# Patient Record
Sex: MALE | Race: WHITE | NOT HISPANIC OR LATINO | Employment: FULL TIME | URBAN - METROPOLITAN AREA
[De-identification: names, ages, dates, MRNs, and addresses within clinical notes are randomized per-mention and may not be internally consistent; named-entity substitution may affect disease eponyms.]

---

## 2023-05-08 ENCOUNTER — PATIENT OUTREACH (OUTPATIENT)
Dept: ONCOLOGY | Facility: CLINIC | Age: 53
End: 2023-05-08
Payer: COMMERCIAL

## 2023-05-08 ENCOUNTER — PRE VISIT (OUTPATIENT)
Dept: OTHER | Age: 53
End: 2023-05-08

## 2023-05-08 ENCOUNTER — TRANSCRIBE ORDERS (OUTPATIENT)
Dept: OTHER | Age: 53
End: 2023-05-08

## 2023-05-08 DIAGNOSIS — J39.8 TRACHEOBRONCHOMALACIA: ICD-10-CM

## 2023-05-08 DIAGNOSIS — R06.00 DYSPNEA, UNSPECIFIED TYPE: Primary | ICD-10-CM

## 2023-05-08 NOTE — PROGRESS NOTES
New Patient: Interventional Pulmonary (Lung nodule) Nurse Navigator Note    Referring provider: Humberto Melara MDGenedav[place] [facility]     Referred to (specialty): Interventional Pulmonary (Lung nodule)    Requested provider (if applicable): n/a    Date Referral Received: 5/8/2023    Evaluation for : tracheobronchomalacia;       Clinical History (per Nurse review of records provided):    **BOOK MARKED**  2/7/2023   CT CHEST W IV CONTRAST   IMPRESSION: Appearances suggesting tracheomalacia.         01/31/23   PULMONARY FUNCTION TESTING     Records Location (Care Everywhere, Media, etc.): Epic, CE ([facility])    Records Needed: Need CT images and PFTs from [facility] from last 5 years     Additional testing needed prior to consult: none

## 2023-05-08 NOTE — TELEPHONE ENCOUNTER
Action 05/08/2023 CDK   Action Taken REQUEST SENT TO [facility] RADIOLOGY FOR ALL CHEST IMAGING  CK

## 2023-05-09 NOTE — PROGRESS NOTES
"LATE ENTRY:  I called Rigoberto yesterday afternoon, MON 5/8 at the request of our scheduling team.  We are attempting to get him scheduled to evaluate if he is a candidate for a stent placement.  He says he will in no way drive to \"the [place]\" for an office visit.  He also refuses to do a video consult as he is unfamiliar with those.  He is requesting a \"phone consult\" only and I told him we are not able to do those, but finally gave in and said I could ask.  He also wants to know if it is true that he would have to have two different procedures, one to place a temp stent and another to place a permament one, if the temp works. I said I could ask that question as well.  I told him I will get back to him the following day if I hear back from the doctor.  "

## 2023-05-09 NOTE — PROGRESS NOTES
"I called and spoke to Rigoberto.  I let him know that Dr. Desai is willing to do a \"telephone encounter\" on FRI 6/9, 9 am.  He is agreeable to that date/time. I also confirmed that he will most likely have multiple procedures for his \"complex problem\" per Dr. Desai.    He also asked about billing as he is going to check with his insurance company to be sure he has good coverage.  I gave him the NPI and TIN numbers for the [facility].    He also took my number in case he has further questions/concerns.  "

## 2023-06-07 NOTE — TELEPHONE ENCOUNTER
RECORDS STATUS - ALL OTHER DIAGNOSIS    Referral for Dyspnea, unspecified type [R06.00]  Tracheobronchomalacia, per pt, Humberto Melara MD  RECORDS RECEIVED FROM: [facility]    DATE RECEIVED:     Action    Action Taken 6/7/2023 5:23pm KEGONZALO    I called [facility]- I was transferred to the Radiology Dept. I called again.. the phone disconnected the first time. The phone disconnected again.. will try calling tomorrow.       NOTES STATUS DETAILS   OFFICE NOTE from referring provider  Referred by: Humberto Melara MD   [facility] PULMONARY MEDICINE      [address] [place] [id number]     Phone: [phone]     Fax: [phone]        OFFICE NOTE from medical oncologist     DISCHARGE SUMMARY from hospital     DISCHARGE REPORT from the ER     OPERATIVE REPORT     CLINICAL TRIAL TREATMENTS TO DATE     LABS     PATHOLOGY REPORTS  CE- 2/17/2023   Non-Gynecologic Cytology Interpretation     Lung, right middle lobe, bronchoalveolar lavage: Negative for malignant cells.      ANYTHING RELATED TO DIAGNOSIS     GENONOMIC TESTING     TYPE:     IMAGING (NEED IMAGES & REPORT)     CT SCANS Request- [facility]  CT Chest    MRI     Xray Chest Request- [facility]     MAMMO     ULTRASOUND     PET

## 2023-06-09 ENCOUNTER — VIRTUAL VISIT (OUTPATIENT)
Dept: PULMONOLOGY | Facility: CLINIC | Age: 53
End: 2023-06-09
Attending: INTERNAL MEDICINE
Payer: COMMERCIAL

## 2023-06-09 ENCOUNTER — PRE VISIT (OUTPATIENT)
Dept: PULMONOLOGY | Facility: CLINIC | Age: 53
End: 2023-06-09

## 2023-06-09 DIAGNOSIS — R06.00 DYSPNEA, UNSPECIFIED TYPE: ICD-10-CM

## 2023-06-09 DIAGNOSIS — J39.8 TRACHEOBRONCHOMALACIA: ICD-10-CM

## 2023-06-09 PROCEDURE — 99443 PR PHYSICIAN TELEPHONE EVALUATION 21-30 MIN: CPT | Performed by: INTERNAL MEDICINE

## 2023-06-09 RX ORDER — ALBUTEROL SULFATE 90 UG/1
2 AEROSOL, METERED RESPIRATORY (INHALATION) EVERY 4 HOURS PRN
COMMUNITY
Start: 2023-01-25 | End: 2024-05-24

## 2023-06-09 RX ORDER — ATORVASTATIN CALCIUM 40 MG/1
1 TABLET, FILM COATED ORAL AT BEDTIME
COMMUNITY
Start: 2022-12-16

## 2023-06-09 RX ORDER — SILDENAFIL CITRATE 20 MG/1
40-60 TABLET ORAL DAILY PRN
COMMUNITY
Start: 2023-03-13

## 2023-06-09 RX ORDER — RAMIPRIL 5 MG/1
1 CAPSULE ORAL AT BEDTIME
COMMUNITY
Start: 2022-12-16

## 2023-06-09 RX ORDER — GLUCAGON 3 MG/1
POWDER NASAL
COMMUNITY
Start: 2021-12-20

## 2023-06-09 RX ORDER — FLUTICASONE PROPIONATE AND SALMETEROL 232; 14 UG/1; UG/1
1 POWDER, METERED RESPIRATORY (INHALATION)
COMMUNITY
Start: 2023-01-31 | End: 2024-05-10

## 2023-06-09 RX ORDER — CARVEDILOL 25 MG/1
TABLET, FILM COATED ORAL
COMMUNITY
Start: 2023-03-04

## 2023-06-09 NOTE — PROGRESS NOTES
Virtual Visit Details    Type of service:  Telephone Visit   Phone call duration:     52M with dyspnea without a clear cause.  He lives up [place] without easy access to video visit.  He is willing to travel but would like to be efficient with things.    He had incidental tracheal collapse seen on CT done for eval of his symptoms.    He has no severe GERD or sinus disease.     He uses an asthma type control inhaler but it does not improve his symptoms.    He had a sleep eval in the past.  I cannot see that he had a sleep study.  At the time of his eval he had lost some weight and his symptoms resolved.    He has gained some weight since then.    He has diabetes which by his report is well controlled.    We discussed the eval.  His CT showed significant collapse.  It may be near the threshold for considering treatment.  We can do an inspection bronch with spontaneous breathing.  His previous bronch was done on the vent.    I discussed weight loss with him.  He is motivated to do this if I think it will help.    I think we can plan on bringing down for inspection bronch and visit with a surgeon.    I discussed with him stent trial and considering surgery - the entire process.  He is willing to go forward with things with that in mind.    25 minutes spent on this phone visit.    Rod Desai MD

## 2023-06-09 NOTE — NURSING NOTE
Is the patient currently in the state of [place]? YES    Visit mode:TELEPHONE    If the visit is dropped, the patient can be reconnected by: TELEPHONE VISIT: Phone number: [phone]    Will anyone else be joining the visit? NO      How would you like to obtain your AVS? Mail a copy    Are changes needed to the allergy or medication list? NO    Reason for visit: Consult      Daphne Smith VF

## 2023-06-16 ENCOUNTER — PREP FOR PROCEDURE (OUTPATIENT)
Dept: MULTI SPECIALTY CLINIC | Facility: CLINIC | Age: 53
End: 2023-06-16
Payer: COMMERCIAL

## 2023-06-16 DIAGNOSIS — J39.8 TRACHEOBRONCHOMALACIA: Primary | ICD-10-CM

## 2023-06-16 RX ORDER — LIDOCAINE 40 MG/G
CREAM TOPICAL
Status: CANCELLED | OUTPATIENT
Start: 2023-06-16

## 2023-06-19 ENCOUNTER — PATIENT OUTREACH (OUTPATIENT)
Dept: ONCOLOGY | Facility: CLINIC | Age: 53
End: 2023-06-19
Payer: COMMERCIAL

## 2023-06-21 NOTE — PROGRESS NOTES
New Patient Oncology Nurse Navigator Note     Referring provider: Dr. Rod Desai    Referring Clinic/Organization: [facility]  Referred to: Thoracic Surgery  Requested provider (if applicable): First available - did not specify   Referral Received: 06/16/23       Evaluation for :   Diagnosis   J39.8 (ICD-10-CM) - Tracheobronchomalacia     Clinical History (per Nurse review of records provided):    01/31/2023 PFT (bookmarked)    02/07/2023 CT Chest w/ contrast - dynamic (care everywhere) showed:   FINDINGS: No discrete pulmonary nodularity or focal consolidation. No pleural or pericardial effusion. No pathologically enlarged mediastinal or hilar lymph nodes.     There is anterior-to-posterior narrowing of the trachea with expiratory imaging. This involves the mainstem bronchi to a lesser amount.     No discrete airway lesion. No bronchiectatic changes.     IMPRESSION: Appearances suggesting tracheomalacia.     06/09/2023 OV notes from referring provider bookmarked - Dr. Desai is arranging for bronch and wants to coordinate surgical consult during same visit.   Clinical Assessment / Barriers to Care (Per Nurse):  Never smoker    [facility]     Records Needed: None  Additional testing needed prior to consult: None  Referral updates and Plan:   Writer called Rigoberto to discuss the referral. He reports he thought Dr. Desai's nurse was going to call him shortly after his virtual visit on 6/9 to schedule the bronch. He also reports he thought the thoracic surgeon was going to see him during the bronch procedure. Writer will send a message to Dr. Desai's team to clarify and will update Rigoberto.     6/30/23: per chart review, IP surgery scheduling has been trying to reach Rigoberto to discuss scheduling the procedure and coordinating with appt with Dr. Dumont. So far, their attempts to reach Rigoberto have been unsuccessful. Per Dr. Desai, the appt with Dr. Dumont does not need to line up for the  procedure, it was only for patient convenience. Will send scheduling instructions to schedule next available with Dr. Dumont.     Caitlyn Morales, CALDERONN, RN, N  [facility] Oncology Nurse Navigator  [phone] / [phone]

## 2023-06-23 ENCOUNTER — TELEPHONE (OUTPATIENT)
Dept: PULMONOLOGY | Facility: CLINIC | Age: 53
End: 2023-06-23
Payer: COMMERCIAL

## 2023-06-23 NOTE — TELEPHONE ENCOUNTER
Writer attempted to reach the patient to schedule GI procedure for 07/05, 07/12, or 07/19 per Dr. Dumont's availability. Left detailed message with callback number [phone].    Vj Ortega on 6/23/2023 at 8:14 AM

## 2023-06-28 NOTE — TELEPHONE ENCOUNTER
Writer contacted the patient to schedule GI procedure. Writer was given dates 07/10-07/13, but patient requested a Friday. Staff message sent to provider team for assessment. Will follow up.    Vj Ortega on 6/28/2023 at 11:52 AM

## 2023-06-30 NOTE — TELEPHONE ENCOUNTER
attempted to reach the patient to schedule IP procedure after confirmation that Dr. Dumont is not available on Fridays. Left detailed message with callback number [phone].    Vj Ortega on 6/30/2023 at 2:23 PM

## 2023-07-06 ENCOUNTER — CARE COORDINATION (OUTPATIENT)
Dept: PULMONOLOGY | Facility: CLINIC | Age: 53
End: 2023-07-06
Payer: COMMERCIAL

## 2023-07-06 NOTE — PROGRESS NOTES
RNCC send preoperative instructions to pt via encrypted e-mail per pt request as pt does not have MyChart and wanted in writing.     E-mail sent to [email or web address] (verified by pt).     The following was discussed in the e-mail sent to pt:  -- procedure and MD performing procedure  -- Date and time of arrival + surgery start time  -- location of [facility] EB  --  and where to check in  -- will need  day of procedure  -- wear comfy clothing to change in and out of  -- leave valuables, dentures, jewelry at home  -- NPO status, liquid status  -- take shower am of procedure  -- length of procedure, that it is outpatient, length in pre/post op  -- number for Vj (procedure ) should more questions arise/need to cancel or reschedule.

## 2023-07-06 NOTE — TELEPHONE ENCOUNTER
Writer spoke with the patient regarding scheduling GI procedure. Scheduled for 07/13 with Dr. Desai. H&P not needed for endo case. Packet information being emailed per patient request.    Vj Ortega on 7/6/2023 at 8:59 AM

## 2023-07-12 ENCOUNTER — TELEPHONE (OUTPATIENT)
Dept: PULMONOLOGY | Facility: CLINIC | Age: 53
End: 2023-07-12
Payer: COMMERCIAL

## 2023-07-12 NOTE — TELEPHONE ENCOUNTER
Pt contacted clinic for Bronch prep and directions to facility as he is unable to open the secure email that was previously sent.  The following was discussed:    -- Date and time of arrival + surgery start time  -- location of [facility] EB  --  and where to check in  -- will need  day of procedure  -- wear comfy clothing to change in and out of  -- leave valuables, dentures, jewelry at home  -- NPO status, liquid status  -- take shower am of procedure    Pt voiced concern regarding the need for a , he states he was not aware of this when procedure was discussed.  Writer reiterated that procedure will not take place unless he has someone to drive him home.  Pt verbalized understanding.

## 2023-07-13 ENCOUNTER — HOSPITAL ENCOUNTER (OUTPATIENT)
Facility: CLINIC | Age: 53
Discharge: HOME | End: 2023-07-13
Attending: INTERNAL MEDICINE | Admitting: INTERNAL MEDICINE
Payer: COMMERCIAL

## 2023-07-13 VITALS
SYSTOLIC BLOOD PRESSURE: 131 MMHG | RESPIRATION RATE: 16 BRPM | HEART RATE: 80 BPM | OXYGEN SATURATION: 95 % | DIASTOLIC BLOOD PRESSURE: 79 MMHG

## 2023-07-13 LAB
GLUCOSE BLDC GLUCOMTR-MCNC: 50 MG/DL (ref 70–99)
GLUCOSE BLDC GLUCOMTR-MCNC: 86 MG/DL (ref 70–99)

## 2023-07-13 PROCEDURE — 31622 DX BRONCHOSCOPE/WASH: CPT | Mod: GC | Performed by: INTERNAL MEDICINE

## 2023-07-13 PROCEDURE — G0500 MOD SEDAT ENDO SERVICE >5YRS: HCPCS | Performed by: INTERNAL MEDICINE

## 2023-07-13 PROCEDURE — 31624 DX BRONCHOSCOPE/LAVAGE: CPT | Performed by: INTERNAL MEDICINE

## 2023-07-13 PROCEDURE — 250N000011 HC RX IP 250 OP 636: Performed by: INTERNAL MEDICINE

## 2023-07-13 PROCEDURE — 82962 GLUCOSE BLOOD TEST: CPT

## 2023-07-13 PROCEDURE — 250N000009 HC RX 250: Performed by: INTERNAL MEDICINE

## 2023-07-13 RX ORDER — LIDOCAINE HYDROCHLORIDE 40 MG/ML
INJECTION, SOLUTION RETROBULBAR PRN
Status: DISCONTINUED | OUTPATIENT
Start: 2023-07-13 | End: 2023-07-13 | Stop reason: HOSPADM

## 2023-07-13 RX ORDER — LIDOCAINE 40 MG/G
CREAM TOPICAL
Status: DISCONTINUED | OUTPATIENT
Start: 2023-07-13 | End: 2023-07-13 | Stop reason: HOSPADM

## 2023-07-13 RX ORDER — FENTANYL CITRATE 50 UG/ML
INJECTION, SOLUTION INTRAMUSCULAR; INTRAVENOUS PRN
Status: DISCONTINUED | OUTPATIENT
Start: 2023-07-13 | End: 2023-07-13 | Stop reason: HOSPADM

## 2023-07-13 RX ORDER — LIDOCAINE HYDROCHLORIDE 10 MG/ML
INJECTION, SOLUTION INFILTRATION; PERINEURAL PRN
Status: DISCONTINUED | OUTPATIENT
Start: 2023-07-13 | End: 2023-07-13 | Stop reason: HOSPADM

## 2023-07-13 ASSESSMENT — ACTIVITIES OF DAILY LIVING (ADL)
ADLS_ACUITY_SCORE: 35
ADLS_ACUITY_SCORE: 35

## 2023-07-13 NOTE — OR NURSING
Pt underwent bronchoscopy under conscious sedation. Pt transferred to recovery and report given to endo RN.       Stephany Izaguirre RN

## 2023-07-13 NOTE — DISCHARGE INSTRUCTIONS
Post Bronchoscopy Patient Instructions:    July 13, 2023  Brendan Baldwinsimi    Your procedure was completed (bronchoscopy with inspection) without any immediate complications.    You may cough up scant amount of blood for the next 12-24 hours. If you have excessive cough with blood, chest pain, shortness of breath, please report to the closest emergency room.    You may experience low grade (less than 100.5 F) fever next 24 hours, if so, you can take Tylenol. If the fever persists more than 24 hours, please contact to our office or your primary care provider.    Our office (Thoracic/Pulmonary--[phone]) will call you with the results of any samples taken during the procedure. Please note that you may get a result notification through  My Chart  before us calling you as the Laboratories are instructed to release the results as soon as they are available to the patients and providers at the same time. Please allow your provider 24 hours call you to discuss the results.    You may resume your diet as it was prior to procedure.    You may resume your medications after the procedure unless you are instructed to do differently.     Please follow instructions from the nursing staff upon discharge in terms of activity. In general, you should avoid any attention or motor skill requiring activities (e.g., driving or operating any motorized vehicle) for 24 hours as you might be still under the effect of sedation medications. Please make sure an adult to accompany you next 24 hours.     Should you have any question, please do not hesitate to call our office.    Rod Desai MD

## 2023-07-13 NOTE — BRIEF OP NOTE
[facility]    Brief Operative Note    Pre-operative diagnosis: Tracheobronchomalacia [J39.8]  Post-operative diagnosis Same as pre-operative diagnosis    Procedure: Procedure(s):  BRONCHOSCOPY, WITH BRONCHOALVEOLAR LAVAGE  Surgeon: Surgeon(s) and Role:     * Rod Desai MD - Primary  Anesthesia: Moderate Sedation   Estimated Blood Loss: None    Drains: None  Specimens: * No specimens in log *  Findings:   tracheobronchomalacia.  Complications: None.  Implants: * No implants in log *

## 2023-07-14 NOTE — TELEPHONE ENCOUNTER
Referring Provider/Location:  Dr. Rod Desai [facility]  Dx and Code: Tracheobronchomalacia [J39.8]  Appt Date:  7.19.23  Provider: Tim Cardenas    July 14, 2023 10:26 AM TJ   Internal Referral, No outside records needed

## 2023-07-16 LAB — BRONCHOSCOPY: NORMAL

## 2023-07-19 ENCOUNTER — PRE VISIT (OUTPATIENT)
Dept: SURGERY | Facility: CLINIC | Age: 53
End: 2023-07-19
Payer: COMMERCIAL

## 2023-07-26 ENCOUNTER — VIRTUAL VISIT (OUTPATIENT)
Dept: SURGERY | Facility: CLINIC | Age: 53
End: 2023-07-26
Attending: INTERNAL MEDICINE
Payer: COMMERCIAL

## 2023-07-26 VITALS — BODY MASS INDEX: 31.15 KG/M2 | WEIGHT: 230 LBS | HEIGHT: 72 IN

## 2023-07-26 DIAGNOSIS — J39.8 TRACHEOBRONCHOMALACIA: Primary | ICD-10-CM

## 2023-07-26 PROCEDURE — 99443 PR PHYSICIAN TELEPHONE EVALUATION 21-30 MIN: CPT | Performed by: THORACIC SURGERY (CARDIOTHORACIC VASCULAR SURGERY)

## 2023-07-26 NOTE — LETTER
7/26/2023         RE: Brendan Helms  [address]  [place] [address]        Dear Colleague,    Thank you for referring your patient, Brendan Helms, to the [facility]. Please see a copy of my visit note below.    Virtual Visit Details    Type of service:  Telephone Visit   Phone call duration: 30 min      THORACIC SURGERY - NEW PATIENT OFFICE VISIT      Dear Dr. Desai,    I saw Brendan Helms  in consultation for the evaluation and treatment of TBM.    HPI  Brendan Helms is a 52 year old male who presented with exertional dyspnea. He is being evaluated for tracheobronchoplasty. He does not have coughing spells or recurrent pulmonary infections. No significant reflux.   Previsit Tests   PFT None  6MWT None  SGRQ None  7/13/23 Bronchoscopy: 90% collapse affecting trachea and LMSB, 80% collapse of RMSB.     02/07/2023 CT Chest w/ contrast - Appearances suggesting tracheomalacia.     PMH  HLD  DM    PSH    Past Surgical History:   Procedure Laterality Date    BRONCHOSCOPY (RIGID OR FLEXIBLE), DIAGNOSTIC N/A 7/13/2023    Procedure: BRONCHOSCOPY, WITH BRONCHOALVEOLAR LAVAGE;  Surgeon: Rod Desai MD;  Location:  GI         Allergies   Allergen Reactions    Amoxicillin-Pot Clavulanate Hives     Current Outpatient Medications   Medication    albuterol (PROAIR HFA/PROVENTIL HFA/VENTOLIN HFA) 108 (90 Base) MCG/ACT inhaler    atorvastatin (LIPITOR) 40 MG tablet    blood glucose (CONTOUR TEST) test strip    fluticasone-salmeterol (AIRDUO RESPICLICK) 232-14 MCG/ACT inhaler    Glucagon (BAQSIMI ONE PACK) 3 MG/DOSE POWD    insulin aspart (NOVOLOG PEN) 100 UNIT/ML pen    insulin glargine (LANTUS PEN) 100 UNIT/ML pen    ramipril (ALTACE) 5 MG capsule    sildenafil (REVATIO) 20 MG tablet     No current facility-administered medications for this visit.     Social History     Tobacco Use    Smoking status: Never    Smokeless tobacco: Never   Vaping Use    Vaping Use: Never used      Exam  Deferred      From a personal perspective, he is at home with his family.     IMPRESSION   52 year old male with TBM and EDAC.     PLAN  I spent 30 min on the date of the encounter in chart review, patient visit, review of tests, documentation and/or discussion with other providers about the issues documented above. I reviewed the plan as follows:    I discussed with Rigoberto that we would need to complete work up first and then see him in clinic with results.     Necessary Preop Tests & Appointments: PFT, 6MWT, dynamic CT scan, follow up visit with me.     I appreciate the opportunity to participate in the care of your patient and will keep you updated.    Sincerely,    Lisa Ochoa MD

## 2023-07-26 NOTE — NURSING NOTE
Is the patient currently in the state of [place]? YES    Visit mode:TELEPHONE    If the visit is dropped, the patient can be reconnected by: VIDEO VISIT: Text to cell phone: [phone]    Will anyone else be joining the visit? NO      How would you like to obtain your AVS? Mail a copy    Are changes needed to the allergy or medication list? NO    Reason for visit: Consult (Video visit )  Irais Campos

## 2023-07-26 NOTE — PROGRESS NOTES
Virtual Visit Details    Type of service:  Telephone Visit   Phone call duration: 30 min      THORACIC SURGERY - NEW PATIENT OFFICE VISIT      Dear Dr. Desai,    I saw Brendan Helms  in consultation for the evaluation and treatment of TBM.    HPI  Brendan Helms is a 52 year old male who presented with exertional dyspnea. He is being evaluated for tracheobronchoplasty. He does not have coughing spells or recurrent pulmonary infections. No significant reflux.   Previsit Tests   PFT None  6MWT None  SGRQ None  7/13/23 Bronchoscopy: 90% collapse affecting trachea and LMSB, 80% collapse of RMSB.     02/07/2023 CT Chest w/ contrast - Appearances suggesting tracheomalacia.     PMH  HLD  DM    PSH    Past Surgical History:   Procedure Laterality Date    BRONCHOSCOPY (RIGID OR FLEXIBLE), DIAGNOSTIC N/A 7/13/2023    Procedure: BRONCHOSCOPY, WITH BRONCHOALVEOLAR LAVAGE;  Surgeon: Rod Desai MD;  Location:  GI         Allergies   Allergen Reactions    Amoxicillin-Pot Clavulanate Hives     Current Outpatient Medications   Medication    albuterol (PROAIR HFA/PROVENTIL HFA/VENTOLIN HFA) 108 (90 Base) MCG/ACT inhaler    atorvastatin (LIPITOR) 40 MG tablet    blood glucose (CONTOUR TEST) test strip    fluticasone-salmeterol (AIRDUO RESPICLICK) 232-14 MCG/ACT inhaler    Glucagon (BAQSIMI ONE PACK) 3 MG/DOSE POWD    insulin aspart (NOVOLOG PEN) 100 UNIT/ML pen    insulin glargine (LANTUS PEN) 100 UNIT/ML pen    ramipril (ALTACE) 5 MG capsule    sildenafil (REVATIO) 20 MG tablet     No current facility-administered medications for this visit.     Social History     Tobacco Use    Smoking status: Never    Smokeless tobacco: Never   Vaping Use    Vaping Use: Never used     Exam  Deferred      From a personal perspective, he is at home with his family.     IMPRESSION   52 year old male with TBM and EDAC.     PLAN  I spent 30 min on the date of the encounter in chart review, patient visit, review of tests, documentation and/or  discussion with other providers about the issues documented above. I reviewed the plan as follows:    I discussed with Rigoberto that we would need to complete work up first and then see him in clinic with results.     Necessary Preop Tests & Appointments: PFT, 6MWT, dynamic CT scan, follow up visit with me.     I appreciate the opportunity to participate in the care of your patient and will keep you updated.    Sincerely,    Lisa Ochoa MD

## 2023-08-02 DIAGNOSIS — J39.8 TRACHEOBRONCHOMALACIA: Primary | ICD-10-CM

## 2023-08-03 ENCOUNTER — DOCUMENTATION ONLY (OUTPATIENT)
Dept: SURGERY | Facility: CLINIC | Age: 53
End: 2023-08-03
Payer: COMMERCIAL

## 2023-08-03 NOTE — PROGRESS NOTES
Orders for PFT, 6MWT and Dynamic CT faxed to [facility] in [place] at [phone] per patient request. Confirmation of successful transmission received.     Linda Sanchez RN, BSN  Thoracic Surgery RN Care Coordinator

## 2023-08-07 ENCOUNTER — DOCUMENTATION ONLY (OUTPATIENT)
Dept: SURGERY | Facility: CLINIC | Age: 53
End: 2023-08-07
Payer: COMMERCIAL

## 2023-08-07 NOTE — PROGRESS NOTES
Patient reported he hasn't been contacted to schedule 6MWT and PFTs. Orders were faxed with confirmation of successful transmission on 8/3/2023.    Refaxed 6MWT and PFT orders to [facility] in [place] at [phone].     Linda Sanchez RN, BSN  Thoracic Surgery RN Care Coordinator

## 2023-09-11 ENCOUNTER — DOCUMENTATION ONLY (OUTPATIENT)
Dept: SURGERY | Facility: CLINIC | Age: 53
End: 2023-09-11
Payer: COMMERCIAL

## 2023-09-13 NOTE — PROGRESS NOTES
[facility] Respiratory Questionnaire mailed to patient's home address. Instructions to complete and bring to clinic visit with Dr Dumont on Wednesday September 20th, 2023.

## 2023-09-20 ENCOUNTER — ANCILLARY PROCEDURE (OUTPATIENT)
Dept: CT IMAGING | Facility: CLINIC | Age: 53
End: 2023-09-20
Attending: CLINICAL NURSE SPECIALIST
Payer: COMMERCIAL

## 2023-09-20 ENCOUNTER — ONCOLOGY VISIT (OUTPATIENT)
Dept: SURGERY | Facility: CLINIC | Age: 53
End: 2023-09-20
Attending: THORACIC SURGERY (CARDIOTHORACIC VASCULAR SURGERY)
Payer: COMMERCIAL

## 2023-09-20 VITALS
HEART RATE: 84 BPM | OXYGEN SATURATION: 98 % | TEMPERATURE: 98.4 F | RESPIRATION RATE: 16 BRPM | HEIGHT: 72 IN | BODY MASS INDEX: 32.43 KG/M2 | WEIGHT: 239.4 LBS | SYSTOLIC BLOOD PRESSURE: 138 MMHG | DIASTOLIC BLOOD PRESSURE: 79 MMHG

## 2023-09-20 DIAGNOSIS — J39.8 TRACHEOBRONCHOMALACIA: Primary | ICD-10-CM

## 2023-09-20 DIAGNOSIS — J39.8 TRACHEOBRONCHOMALACIA: ICD-10-CM

## 2023-09-20 PROCEDURE — G0463 HOSPITAL OUTPT CLINIC VISIT: HCPCS | Performed by: THORACIC SURGERY (CARDIOTHORACIC VASCULAR SURGERY)

## 2023-09-20 PROCEDURE — 71250 CT THORAX DX C-: CPT | Performed by: RADIOLOGY

## 2023-09-20 PROCEDURE — 99215 OFFICE O/P EST HI 40 MIN: CPT | Performed by: THORACIC SURGERY (CARDIOTHORACIC VASCULAR SURGERY)

## 2023-09-20 ASSESSMENT — PAIN SCALES - GENERAL: PAINLEVEL: NO PAIN (0)

## 2023-09-20 NOTE — NURSING NOTE
"Oncology Rooming Note    September 20, 2023 11:09 AM   Brendan Helms is a 52 year old male who presents for:    Chief Complaint   Patient presents with    Oncology Clinic Visit     [facility] RETURN - TRACHEOBRONCHOMALACIA     Initial Vitals: /79 (BP Location: Right arm, Patient Position: Chair, Cuff Size: Adult Large)   Pulse 84   Temp 98.4  F (36.9  C)   Resp 16   Ht 1.829 m (6' 0.01\")   Wt 108.6 kg (239 lb 6.4 oz)   SpO2 98%   BMI 32.46 kg/m   Estimated body mass index is 32.46 kg/m  as calculated from the following:    Height as of this encounter: 1.829 m (6' 0.01\").    Weight as of this encounter: 108.6 kg (239 lb 6.4 oz). Body surface area is 2.35 meters squared.  No Pain (0) Comment: Data Unavailable   No LMP for male patient.  Allergies reviewed: Yes  Medications reviewed: Yes    Medications: Medication refills not needed today.  Pharmacy name entered into [facility]: [facility] [place] - [address] AT [facility]    Ankur Early LPN              "

## 2023-09-20 NOTE — LETTER
9/20/2023         RE: Brendan Helms  [address]  [place] [address]        Dear Colleague,    Thank you for referring your patient, Brendan Helms, to the [facility]. Please see a copy of my visit note below.    THORACIC SURGERY FOLLOW UP VISIT    I saw Mr. Brendan Helms in follow-up today. The clinical summary follows:     DIAGNOSIS   TBM and EDAC  INTERVAL STUDIES  PFT 8/18/23: FEV1 3.2L, 78%. DLCO 95%.   CT scan 9/20/23:           Previsit tests  7/13/23 Bronchoscopy: 90% collapse affecting trachea and LMSB, 80% collapse of RMSB.     4/28/23 Echo  Technically difficult echocardiogram.   The left ventricular systolic function is normal.   Left ventricular diastolic function is normal.   No significant valvular stenosis or insufficiency seen.   TR signal inadequate to allow accurate estimate RV systolic pressure.   There is no pericardial effusion.     No past medical history on file.    Past Surgical History:   Procedure Laterality Date    BRONCHOSCOPY (RIGID OR FLEXIBLE), DIAGNOSTIC N/A 7/13/2023    Procedure: BRONCHOSCOPY, WITH BRONCHOALVEOLAR LAVAGE;  Surgeon: Rod Desai MD;  Location:  GI      Allergies   Allergen Reactions    Amoxicillin-Pot Clavulanate Hives     Current Outpatient Medications   Medication    albuterol (PROAIR HFA/PROVENTIL HFA/VENTOLIN HFA) 108 (90 Base) MCG/ACT inhaler    atorvastatin (LIPITOR) 40 MG tablet    blood glucose (CONTOUR TEST) test strip    fluticasone-salmeterol (AIRDUO RESPICLICK) 232-14 MCG/ACT inhaler    Glucagon (BAQSIMI ONE PACK) 3 MG/DOSE POWD    insulin aspart (NOVOLOG PEN) 100 UNIT/ML pen    insulin glargine (LANTUS PEN) 100 UNIT/ML pen    ramipril (ALTACE) 5 MG capsule    sildenafil (REVATIO) 20 MG tablet     No current facility-administered medications for this visit.     Social History     Tobacco Use    Smoking status: Never    Smokeless tobacco: Never   Vaping Use    Vaping Use: Never used     Exam  /79 (BP  "Location: Right arm, Patient Position: Chair, Cuff Size: Adult Large)   Pulse 84   Temp 98.4  F (36.9  C)   Resp 16   Ht 1.829 m (6' 0.01\")   Wt 108.6 kg (239 lb 6.4 oz)   SpO2 98%   BMI 32.46 kg/m    Alert and oriented. NAD  Bilateral breath sounds.       SUBJECTIVE  He comes to clinic for a follow up after his work up. His main symptom is exertional dyspnea which is impacting his life significantly. He can't even shovel snow for more than 5 min without getting winded. He has mild coughing spells in the morning soon after he gets in the shower. No recurrent pulmonary infections. No heartburn or regurgitation.     He does not use a CPAP machine and is not willing to try it because he believes it will interfere with his ability to get a 's license that he needs for his work.     From a personal perspective, he comes to clinic alone. 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     IMPRESSION  51 y/o with TBM and EDAC.     PLAN  I spent 60 min on the date of the encounter in chart review, patient visit, review of tests, documentation and/or discussion with other providers about the issues documented above. I reviewed the plan as follows:  I discussed with Rigoberto his treatment options. I told him I would like to do a stent trial and schedule him for surgery afterwards.   Procedure planned: Airway stent trial (by Lloyd)     Right VATS possible open TBP  Necessary Tests & Appointments: Echo &  6MWT results (get from [place]).  Pain Control Plan: intercostal nerve cryoablation.  Anticoagulation Plan: Enoxaparin postop.    I had an extensive discussion with the patient and his family regarding the rationale for surgery, the alternatives risks and benefits of the procedure. I explained the expected hospital stay, postoperative course, recovery time, diet and activity restrictions. Informed consent was obtained and they agreed to proceed.  All questions were answered and the patient and " present family were in agreement with the plan.  I appreciate the opportunity to participate in the care of your patient and will keep you updated.  Sincerely,  Lisa Ochoa MD

## 2023-09-20 NOTE — PROGRESS NOTES
"THORACIC SURGERY FOLLOW UP VISIT    I saw Mr. Brendan Helms in follow-up today. The clinical summary follows:     DIAGNOSIS   TBM and EDAC  INTERVAL STUDIES  PFT 8/18/23: FEV1 3.2L, 78%. DLCO 95%.   CT scan 9/20/23:           Previsit tests  7/13/23 Bronchoscopy: 90% collapse affecting trachea and LMSB, 80% collapse of RMSB.     4/28/23 Echo  Technically difficult echocardiogram.   The left ventricular systolic function is normal.   Left ventricular diastolic function is normal.   No significant valvular stenosis or insufficiency seen.   TR signal inadequate to allow accurate estimate RV systolic pressure.   There is no pericardial effusion.     No past medical history on file.    Past Surgical History:   Procedure Laterality Date    BRONCHOSCOPY (RIGID OR FLEXIBLE), DIAGNOSTIC N/A 7/13/2023    Procedure: BRONCHOSCOPY, WITH BRONCHOALVEOLAR LAVAGE;  Surgeon: Rod Desai MD;  Location:  GI      Allergies   Allergen Reactions    Amoxicillin-Pot Clavulanate Hives     Current Outpatient Medications   Medication    albuterol (PROAIR HFA/PROVENTIL HFA/VENTOLIN HFA) 108 (90 Base) MCG/ACT inhaler    atorvastatin (LIPITOR) 40 MG tablet    blood glucose (CONTOUR TEST) test strip    fluticasone-salmeterol (AIRDUO RESPICLICK) 232-14 MCG/ACT inhaler    Glucagon (BAQSIMI ONE PACK) 3 MG/DOSE POWD    insulin aspart (NOVOLOG PEN) 100 UNIT/ML pen    insulin glargine (LANTUS PEN) 100 UNIT/ML pen    ramipril (ALTACE) 5 MG capsule    sildenafil (REVATIO) 20 MG tablet     No current facility-administered medications for this visit.     Social History     Tobacco Use    Smoking status: Never    Smokeless tobacco: Never   Vaping Use    Vaping Use: Never used     Exam  /79 (BP Location: Right arm, Patient Position: Chair, Cuff Size: Adult Large)   Pulse 84   Temp 98.4  F (36.9  C)   Resp 16   Ht 1.829 m (6' 0.01\")   Wt 108.6 kg (239 lb 6.4 oz)   SpO2 98%   BMI 32.46 kg/m    Alert and oriented. NAD  Bilateral breath " sounds.       SUBJECTIVE  He comes to clinic for a follow up after his work up. His main symptom is exertional dyspnea which is impacting his life significantly. He can't even shovel snow for more than 5 min without getting winded. He has mild coughing spells in the morning soon after he gets in the shower. No recurrent pulmonary infections. No heartburn or regurgitation.     He does not use a CPAP machine and is not willing to try it because he believes it will interfere with his ability to get a 's license that he needs for his work.     From a personal perspective, he comes to clinic alone. 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     IMPRESSION  53 y/o with TBM and EDAC.     PLAN  I spent 60 min on the date of the encounter in chart review, patient visit, review of tests, documentation and/or discussion with other providers about the issues documented above. I reviewed the plan as follows:  I discussed with Rigoberto his treatment options. I told him I would like to do a stent trial and schedule him for surgery afterwards.   Procedure planned: Airway stent trial (by Lloyd)     Right VATS possible open TBP  Necessary Tests & Appointments: Echo &  6MWT results (get from [place]).  Pain Control Plan: intercostal nerve cryoablation.  Anticoagulation Plan: Enoxaparin postop.    I had an extensive discussion with the patient and his family regarding the rationale for surgery, the alternatives risks and benefits of the procedure. I explained the expected hospital stay, postoperative course, recovery time, diet and activity restrictions. Informed consent was obtained and they agreed to proceed.  All questions were answered and the patient and present family were in agreement with the plan.  I appreciate the opportunity to participate in the care of your patient and will keep you updated.  Sincerely,  Lisa Ochoa MD

## 2023-10-04 ENCOUNTER — PATIENT OUTREACH (OUTPATIENT)
Dept: SURGERY | Facility: CLINIC | Age: 53
End: 2023-10-04
Payer: COMMERCIAL

## 2023-10-04 NOTE — TELEPHONE ENCOUNTER
"[facility]: Cancer Care                                                                                          Called patient to follow-up on triage call yesterday. Informed patient that Dr Desai's department is the department that inserts and removes the stents for the stent trial. Reviewed the general time line for the Stent trial. Placement, see Dr Dumont in clinic 5-7 days later, and then stent removal. Patient interrupted writer and verbalized frustration stating \"no-one is on the same page\". States each time he sees someone, more tests are needed.   Patient reports that he is being told different plans by both providers.   Patient verbalized \"If I have to do the stent trial, I want to go directly to the operating room after the removal of the stent. I don't want to be waiting around waiting to get the reconstruction surgery scheduled.\" States that he wants the surgery done before the end of the year and preferably before the snow falls (he plows snow for a living). He has to travel to get to these appointments and does not want any additional trips if possible. \"Would like it if all can be coordinated to occur within one trip\"    Writer acknowledged patient's frustration and will bring questions/concerns to Dr Desai and Dr Dumont.     Writer inquired about the SGRQ that had been mailed to patient. Writer hasn't received back. Patient verbalized that he completed the SGRQ and that no one asked for it when he was at his clinic visit on 9/20. States that he he still has it. Writer instructed him to hold on to it and writer will arrange to collect it from him at one of his upcoming appointments.       Signature:  Linda Sanchez RN  "

## 2023-10-05 DIAGNOSIS — J39.8 TRACHEOBRONCHOMALACIA: Primary | ICD-10-CM

## 2023-10-12 ENCOUNTER — TELEPHONE (OUTPATIENT)
Dept: PULMONOLOGY | Facility: CLINIC | Age: 53
End: 2023-10-12
Payer: COMMERCIAL

## 2023-10-12 NOTE — TELEPHONE ENCOUNTER
Pt LVM for RNCC regarding scheduling his procedure soon.     Message forwarded to Vj to work on getting pt scheduled.

## 2023-10-13 NOTE — TELEPHONE ENCOUNTER
Urgent patient calling back to schedule procedure . This preludes his main surgery so needs to get done asap. Thank you

## 2023-10-17 ENCOUNTER — TELEPHONE (OUTPATIENT)
Dept: PULMONOLOGY | Facility: CLINIC | Age: 53
End: 2023-10-17
Payer: COMMERCIAL

## 2023-10-17 NOTE — TELEPHONE ENCOUNTER
Writer contacted patient regarding scheduling 2 IP procedures. Patient expressed dissatisfaction with how long it had been between his appointment and the call to schedule. Writer explained that while the case request had been submitted on 10/05, writer was out with COVID the week of 10/09. Patient doesn't want to schedule because nobody is on the same page and he'd been told different things about timing by different providers. He also said it was too late in the season to schedule due to his job. Writer attempted to deescalate but patient began swearing and berating writer. Nothing scheduled at this time.    Vj Ortega on 10/17/2023 at 10:00 AM

## 2024-03-29 ENCOUNTER — TELEPHONE (OUTPATIENT)
Dept: PULMONOLOGY | Facility: CLINIC | Age: 54
End: 2024-03-29
Payer: COMMERCIAL

## 2024-03-29 ENCOUNTER — CARE COORDINATION (OUTPATIENT)
Dept: PULMONOLOGY | Facility: CLINIC | Age: 54
End: 2024-03-29
Payer: COMMERCIAL

## 2024-03-29 NOTE — TELEPHONE ENCOUNTER
Writer contacted patient to schedule 2 IP procedures. Scheduled 04/05 with Dr. Desai, 04/19 with Dr. George. Packet information sent via mail/email.    Vj Ortega on 3/29/2024 at 1:51 PM

## 2024-03-29 NOTE — PROGRESS NOTES
Preoperative instructions sent to patient via [employer] mail and secure email to pt verified email in chart ([email or web address]) per pt request as he does not have access to [employer].    The following is what was sent via mail/email:      Grace Franklin,    Below are the following instructions you will need for your upcoming procedure. Read through these and let me know if you have any further questions.     Remember - you need to have a history and physical pre procedure exam completed by your primary care provider PRIOR to your procedure date. Please do not hesitate to schedule this appointment as it is a requirement for your procedure.     At your preoperative appointment, your provider will discuss which other medications you should hold/stop prior to your procedure.     Your Surgery Day - Placement:   Your surgery is on: 2024  ARRIVAL TIME: 7:35 am  Surgery Time: 9:35 am    Your Surgery Day - Removal:   Your surgery is on: 2024  ARRIVAL TIME: 9:45 am  Surgery Time: 11:45 am    Surgery Location:      [facility]      Address:      [address] [place] [id number]      Day of surgery: [phone]      Other questions: [phone]      Fax test results to: [phone]    Check-in:   You may use our  parking at the main entrance  Check in at the welcome desk in the main lobby and you will be directed to where you need to go from there.    Important phone numbers:  [phone]    Please remember that surgery times are subject to change. You will be notified if your surgery time changes.      Preparing for surgery    Call your clinic if there's any change in your health. This includes signs of a cold or flu (sore throat, runny nose, cough, rash, fever). It also includes a scrape or scratch near the surgery site. If you have questions on the day of surgery, call your hospital or surgery  center.     Eating and drinking guidelines  For your safety: Unless your surgeon tells you otherwise, follow the guidelines below.  Eat and drink as usual until 8 hours before you arrive for surgery. After that, no food or milk.  Drink clear liquids until 2 hours before you arrive. These are liquids you can see through, like water, Gatorade, and Propel Water. They also include plain black coffee and tea (no cream or milk), candy, and breath mints. You can spit out gum when you arrive.  If you drink alcohol: Stop drinking it the night before surgery.  If your care team tells you to take medicine on the morning of surgery, it's okay to take it with a sip of water.    Preventing infection  Shower or bathe the morning of your surgery.   Don't smoke or vape the morning of surgery. You may chew nicotine gum up to 2 hours before surgery. A nicotine patch is okay.  Note: Some surgeries require you to completely quit smoking and nicotine. Check with your surgeon.    Your care team will make every effort to keep you safe from infection. We will:  Clean our hands often with soap and water (or an alcohol-based hand rub).  Clean the skin at your surgery site with a special soap that kills germs.  Give you a special gown to keep you warm. (Cold raises the risk of infection.)  Wear special hair covers, masks, gowns, and gloves during surgery.     What to bring on the day of surgery  Photo ID and insurance card  Glasses and hearing aids (bring cases) you can't wear contacts during surgery    Please remove any jewelry, including body piercings. Leave jewelry and other valuables at home.     If you're going home the day of surgery  You must have a responsible adult drive you home. They should stay with you overnight as well.  If you don't have someone to stay with you, and you aren't safe to go home alone, we may keep you overnight. Insurance often won't pay for this.

## 2024-04-04 ENCOUNTER — ANESTHESIA EVENT (OUTPATIENT)
Dept: SURGERY | Facility: CLINIC | Age: 54
End: 2024-04-04
Payer: COMMERCIAL

## 2024-04-05 ENCOUNTER — APPOINTMENT (OUTPATIENT)
Dept: GENERAL RADIOLOGY | Facility: CLINIC | Age: 54
End: 2024-04-05
Attending: INTERNAL MEDICINE
Payer: COMMERCIAL

## 2024-04-05 ENCOUNTER — HOSPITAL ENCOUNTER (OUTPATIENT)
Facility: CLINIC | Age: 54
Discharge: HOME | End: 2024-04-05
Attending: INTERNAL MEDICINE | Admitting: INTERNAL MEDICINE
Payer: COMMERCIAL

## 2024-04-05 ENCOUNTER — ANESTHESIA (OUTPATIENT)
Dept: SURGERY | Facility: CLINIC | Age: 54
End: 2024-04-05
Payer: COMMERCIAL

## 2024-04-05 VITALS
WEIGHT: 236.77 LBS | HEIGHT: 72 IN | SYSTOLIC BLOOD PRESSURE: 135 MMHG | HEART RATE: 96 BPM | OXYGEN SATURATION: 95 % | RESPIRATION RATE: 16 BRPM | DIASTOLIC BLOOD PRESSURE: 87 MMHG | TEMPERATURE: 97.8 F | BODY MASS INDEX: 32.07 KG/M2

## 2024-04-05 DIAGNOSIS — J39.8 TRACHEOBRONCHOMALACIA: Primary | ICD-10-CM

## 2024-04-05 DIAGNOSIS — R05.3 CHRONIC COUGH: ICD-10-CM

## 2024-04-05 DIAGNOSIS — J45.20 MILD INTERMITTENT ASTHMA WITHOUT COMPLICATION: ICD-10-CM

## 2024-04-05 LAB
GLUCOSE BLDC GLUCOMTR-MCNC: 113 MG/DL (ref 70–99)
GLUCOSE BLDC GLUCOMTR-MCNC: 204 MG/DL (ref 70–99)

## 2024-04-05 PROCEDURE — 999N000179 XR SURGERY CARM FLUORO LESS THAN 5 MIN W STILLS: Mod: TC

## 2024-04-05 PROCEDURE — 250N000009 HC RX 250: Performed by: ANESTHESIOLOGY

## 2024-04-05 PROCEDURE — 710N000010 HC RECOVERY PHASE 1, LEVEL 2, PER MIN: Performed by: INTERNAL MEDICINE

## 2024-04-05 PROCEDURE — 360N000083 HC SURGERY LEVEL 3 W/ FLUORO, PER MIN: Performed by: INTERNAL MEDICINE

## 2024-04-05 PROCEDURE — 999N000065 XR CHEST PORT 1 VIEW

## 2024-04-05 PROCEDURE — 31631 BRONCHOSCOPY DILATE W/STENT: CPT | Performed by: ANESTHESIOLOGY

## 2024-04-05 PROCEDURE — 999N000141 HC STATISTIC PRE-PROCEDURE NURSING ASSESSMENT: Performed by: INTERNAL MEDICINE

## 2024-04-05 PROCEDURE — 250N000011 HC RX IP 250 OP 636: Performed by: NURSE ANESTHETIST, CERTIFIED REGISTERED

## 2024-04-05 PROCEDURE — 82962 GLUCOSE BLOOD TEST: CPT

## 2024-04-05 PROCEDURE — 250N000009 HC RX 250: Performed by: NURSE ANESTHETIST, CERTIFIED REGISTERED

## 2024-04-05 PROCEDURE — 258N000003 HC RX IP 258 OP 636: Performed by: NURSE ANESTHETIST, CERTIFIED REGISTERED

## 2024-04-05 PROCEDURE — C1769 GUIDE WIRE: HCPCS | Performed by: INTERNAL MEDICINE

## 2024-04-05 PROCEDURE — 250N000013 HC RX MED GY IP 250 OP 250 PS 637: Performed by: ANESTHESIOLOGY

## 2024-04-05 PROCEDURE — 370N000017 HC ANESTHESIA TECHNICAL FEE, PER MIN: Performed by: INTERNAL MEDICINE

## 2024-04-05 PROCEDURE — 71045 X-RAY EXAM CHEST 1 VIEW: CPT | Mod: 26 | Performed by: RADIOLOGY

## 2024-04-05 PROCEDURE — 31631 BRONCHOSCOPY DILATE W/STENT: CPT | Performed by: NURSE ANESTHETIST, CERTIFIED REGISTERED

## 2024-04-05 PROCEDURE — 272N000051 HC BRUSH BRONCH CYTOLOGY: Performed by: INTERNAL MEDICINE

## 2024-04-05 PROCEDURE — 31636 BRONCHOSCOPY BRONCH STENTS: CPT | Performed by: INTERNAL MEDICINE

## 2024-04-05 PROCEDURE — 710N000012 HC RECOVERY PHASE 2, PER MINUTE: Performed by: INTERNAL MEDICINE

## 2024-04-05 PROCEDURE — 272N000001 HC OR GENERAL SUPPLY STERILE: Performed by: INTERNAL MEDICINE

## 2024-04-05 DEVICE — Y-SHAPED, MAIN BODY18×40,ONE BRANCH12×30, THE OTHER BRANCH12×15, PARTIALLY COVERED, DELIVERY SYSTEM 8×650
Type: IMPLANTABLE DEVICE | Site: BRONCHUS | Status: NON-FUNCTIONAL
Brand: TRACHEAL STENT SYSTEM (Y-SHAPED)
Removed: 2024-04-19

## 2024-04-05 RX ORDER — ONDANSETRON 2 MG/ML
INJECTION INTRAMUSCULAR; INTRAVENOUS PRN
Status: DISCONTINUED | OUTPATIENT
Start: 2024-04-05 | End: 2024-04-05

## 2024-04-05 RX ORDER — NALOXONE HYDROCHLORIDE 0.4 MG/ML
0.1 INJECTION, SOLUTION INTRAMUSCULAR; INTRAVENOUS; SUBCUTANEOUS
Status: DISCONTINUED | OUTPATIENT
Start: 2024-04-05 | End: 2024-04-05 | Stop reason: HOSPADM

## 2024-04-05 RX ORDER — CODEINE PHOSPHATE/GUAIFENESIN 10-100MG/5
5 LIQUID (ML) ORAL EVERY 4 HOURS PRN
Qty: 237 ML | Refills: 0 | Status: SHIPPED | OUTPATIENT
Start: 2024-04-05 | End: 2024-05-10

## 2024-04-05 RX ORDER — HYDROMORPHONE HCL IN WATER/PF 6 MG/30 ML
0.4 PATIENT CONTROLLED ANALGESIA SYRINGE INTRAVENOUS EVERY 5 MIN PRN
Status: DISCONTINUED | OUTPATIENT
Start: 2024-04-05 | End: 2024-04-05 | Stop reason: HOSPADM

## 2024-04-05 RX ORDER — ONDANSETRON 4 MG/1
4 TABLET, ORALLY DISINTEGRATING ORAL EVERY 30 MIN PRN
Status: DISCONTINUED | OUTPATIENT
Start: 2024-04-05 | End: 2024-04-10 | Stop reason: HOSPADM

## 2024-04-05 RX ORDER — LIDOCAINE HYDROCHLORIDE 20 MG/ML
INJECTION, SOLUTION INFILTRATION; PERINEURAL PRN
Status: DISCONTINUED | OUTPATIENT
Start: 2024-04-05 | End: 2024-04-05

## 2024-04-05 RX ORDER — ALBUTEROL SULFATE 0.83 MG/ML
2.5 SOLUTION RESPIRATORY (INHALATION)
Status: COMPLETED | OUTPATIENT
Start: 2024-04-05 | End: 2024-04-05

## 2024-04-05 RX ORDER — ESMOLOL HYDROCHLORIDE 10 MG/ML
INJECTION INTRAVENOUS PRN
Status: DISCONTINUED | OUTPATIENT
Start: 2024-04-05 | End: 2024-04-05

## 2024-04-05 RX ORDER — OXYCODONE HYDROCHLORIDE 5 MG/1
5 TABLET ORAL
Status: COMPLETED | OUTPATIENT
Start: 2024-04-05 | End: 2024-04-05

## 2024-04-05 RX ORDER — ONDANSETRON 2 MG/ML
4 INJECTION INTRAMUSCULAR; INTRAVENOUS EVERY 30 MIN PRN
Status: DISCONTINUED | OUTPATIENT
Start: 2024-04-05 | End: 2024-04-05 | Stop reason: HOSPADM

## 2024-04-05 RX ORDER — SODIUM CHLORIDE, SODIUM LACTATE, POTASSIUM CHLORIDE, CALCIUM CHLORIDE 600; 310; 30; 20 MG/100ML; MG/100ML; MG/100ML; MG/100ML
INJECTION, SOLUTION INTRAVENOUS CONTINUOUS
Status: DISCONTINUED | OUTPATIENT
Start: 2024-04-05 | End: 2024-04-05 | Stop reason: HOSPADM

## 2024-04-05 RX ORDER — ALBUTEROL SULFATE 90 UG/1
2 AEROSOL, METERED RESPIRATORY (INHALATION)
Status: DISCONTINUED | OUTPATIENT
Start: 2024-04-05 | End: 2024-04-10 | Stop reason: HOSPADM

## 2024-04-05 RX ORDER — HYDRALAZINE HYDROCHLORIDE 20 MG/ML
2.5-5 INJECTION INTRAMUSCULAR; INTRAVENOUS EVERY 10 MIN PRN
Status: DISCONTINUED | OUTPATIENT
Start: 2024-04-05 | End: 2024-04-05 | Stop reason: HOSPADM

## 2024-04-05 RX ORDER — NALOXONE HYDROCHLORIDE 0.4 MG/ML
0.1 INJECTION, SOLUTION INTRAMUSCULAR; INTRAVENOUS; SUBCUTANEOUS
Status: DISCONTINUED | OUTPATIENT
Start: 2024-04-05 | End: 2024-04-10 | Stop reason: HOSPADM

## 2024-04-05 RX ORDER — PROPOFOL 10 MG/ML
INJECTION, EMULSION INTRAVENOUS PRN
Status: DISCONTINUED | OUTPATIENT
Start: 2024-04-05 | End: 2024-04-05

## 2024-04-05 RX ORDER — HYDROMORPHONE HCL IN WATER/PF 6 MG/30 ML
0.2 PATIENT CONTROLLED ANALGESIA SYRINGE INTRAVENOUS EVERY 5 MIN PRN
Status: DISCONTINUED | OUTPATIENT
Start: 2024-04-05 | End: 2024-04-05 | Stop reason: HOSPADM

## 2024-04-05 RX ORDER — DEXAMETHASONE SODIUM PHOSPHATE 4 MG/ML
INJECTION, SOLUTION INTRA-ARTICULAR; INTRALESIONAL; INTRAMUSCULAR; INTRAVENOUS; SOFT TISSUE PRN
Status: DISCONTINUED | OUTPATIENT
Start: 2024-04-05 | End: 2024-04-05

## 2024-04-05 RX ORDER — FENTANYL CITRATE 50 UG/ML
25 INJECTION, SOLUTION INTRAMUSCULAR; INTRAVENOUS EVERY 5 MIN PRN
Status: DISCONTINUED | OUTPATIENT
Start: 2024-04-05 | End: 2024-04-05 | Stop reason: HOSPADM

## 2024-04-05 RX ORDER — FENTANYL CITRATE 50 UG/ML
50 INJECTION, SOLUTION INTRAMUSCULAR; INTRAVENOUS EVERY 5 MIN PRN
Status: DISCONTINUED | OUTPATIENT
Start: 2024-04-05 | End: 2024-04-05 | Stop reason: HOSPADM

## 2024-04-05 RX ORDER — SODIUM CHLORIDE, SODIUM LACTATE, POTASSIUM CHLORIDE, CALCIUM CHLORIDE 600; 310; 30; 20 MG/100ML; MG/100ML; MG/100ML; MG/100ML
INJECTION, SOLUTION INTRAVENOUS CONTINUOUS PRN
Status: DISCONTINUED | OUTPATIENT
Start: 2024-04-05 | End: 2024-04-05

## 2024-04-05 RX ORDER — FENTANYL CITRATE 50 UG/ML
INJECTION, SOLUTION INTRAMUSCULAR; INTRAVENOUS PRN
Status: DISCONTINUED | OUTPATIENT
Start: 2024-04-05 | End: 2024-04-05

## 2024-04-05 RX ORDER — PROPOFOL 10 MG/ML
INJECTION, EMULSION INTRAVENOUS CONTINUOUS PRN
Status: DISCONTINUED | OUTPATIENT
Start: 2024-04-05 | End: 2024-04-05

## 2024-04-05 RX ORDER — LABETALOL HYDROCHLORIDE 5 MG/ML
10 INJECTION, SOLUTION INTRAVENOUS
Status: DISCONTINUED | OUTPATIENT
Start: 2024-04-05 | End: 2024-04-05 | Stop reason: HOSPADM

## 2024-04-05 RX ORDER — SODIUM CHLORIDE FOR INHALATION 0.9 %
3 VIAL, NEBULIZER (ML) INHALATION 2 TIMES DAILY
Qty: 120 ML | Refills: 0 | Status: SHIPPED | OUTPATIENT
Start: 2024-04-05 | End: 2024-05-10

## 2024-04-05 RX ORDER — OXYCODONE HYDROCHLORIDE 10 MG/1
10 TABLET ORAL
Status: DISCONTINUED | OUTPATIENT
Start: 2024-04-05 | End: 2024-04-10 | Stop reason: HOSPADM

## 2024-04-05 RX ORDER — ONDANSETRON 2 MG/ML
4 INJECTION INTRAMUSCULAR; INTRAVENOUS EVERY 30 MIN PRN
Status: DISCONTINUED | OUTPATIENT
Start: 2024-04-05 | End: 2024-04-10 | Stop reason: HOSPADM

## 2024-04-05 RX ORDER — ONDANSETRON 4 MG/1
4 TABLET, ORALLY DISINTEGRATING ORAL EVERY 30 MIN PRN
Status: DISCONTINUED | OUTPATIENT
Start: 2024-04-05 | End: 2024-04-05 | Stop reason: HOSPADM

## 2024-04-05 RX ORDER — LIDOCAINE 40 MG/G
CREAM TOPICAL
Status: DISCONTINUED | OUTPATIENT
Start: 2024-04-05 | End: 2024-04-05 | Stop reason: HOSPADM

## 2024-04-05 RX ADMIN — MIDAZOLAM 2 MG: 1 INJECTION INTRAMUSCULAR; INTRAVENOUS at 08:51

## 2024-04-05 RX ADMIN — PROPOFOL 200 MG: 10 INJECTION, EMULSION INTRAVENOUS at 09:00

## 2024-04-05 RX ADMIN — OXYCODONE HYDROCHLORIDE 5 MG: 5 TABLET ORAL at 11:24

## 2024-04-05 RX ADMIN — LIDOCAINE HYDROCHLORIDE 3 ML: 40 INJECTION, SOLUTION RETROBULBAR; TOPICAL at 10:07

## 2024-04-05 RX ADMIN — PHENYLEPHRINE HYDROCHLORIDE 100 MCG: 10 INJECTION INTRAVENOUS at 09:00

## 2024-04-05 RX ADMIN — LIDOCAINE HYDROCHLORIDE 100 MG: 20 INJECTION, SOLUTION INFILTRATION; PERINEURAL at 09:00

## 2024-04-05 RX ADMIN — PROPOFOL 150 MCG/KG/MIN: 10 INJECTION, EMULSION INTRAVENOUS at 09:00

## 2024-04-05 RX ADMIN — SODIUM CHLORIDE, POTASSIUM CHLORIDE, SODIUM LACTATE AND CALCIUM CHLORIDE: 600; 310; 30; 20 INJECTION, SOLUTION INTRAVENOUS at 08:57

## 2024-04-05 RX ADMIN — FENTANYL CITRATE 50 MCG: 50 INJECTION INTRAMUSCULAR; INTRAVENOUS at 09:10

## 2024-04-05 RX ADMIN — DEXAMETHASONE SODIUM PHOSPHATE 8 MG: 4 INJECTION, SOLUTION INTRA-ARTICULAR; INTRALESIONAL; INTRAMUSCULAR; INTRAVENOUS; SOFT TISSUE at 09:00

## 2024-04-05 RX ADMIN — LIDOCAINE HYDROCHLORIDE 100 MG: 20 INJECTION, SOLUTION INFILTRATION; PERINEURAL at 09:39

## 2024-04-05 RX ADMIN — SUGAMMADEX 200 MG: 100 INJECTION, SOLUTION INTRAVENOUS at 09:26

## 2024-04-05 RX ADMIN — Medication 40 MG: at 09:00

## 2024-04-05 RX ADMIN — ONDANSETRON 4 MG: 2 INJECTION INTRAMUSCULAR; INTRAVENOUS at 09:24

## 2024-04-05 RX ADMIN — ESMOLOL HYDROCHLORIDE 30 MG: 10 INJECTION, SOLUTION INTRAVENOUS at 09:18

## 2024-04-05 RX ADMIN — ALBUTEROL SULFATE 2.5 MG: 2.5 SOLUTION RESPIRATORY (INHALATION) at 10:15

## 2024-04-05 RX ADMIN — FENTANYL CITRATE 50 MCG: 50 INJECTION INTRAMUSCULAR; INTRAVENOUS at 09:00

## 2024-04-05 ASSESSMENT — ACTIVITIES OF DAILY LIVING (ADL)
ADLS_ACUITY_SCORE: 18
ADLS_ACUITY_SCORE: 16
ADLS_ACUITY_SCORE: 18
ADLS_ACUITY_SCORE: 18

## 2024-04-05 NOTE — PROCEDURES
INTERVENTIONAL PULMONOLOGY       Procedure(s):    A flexible and rigid bronchoscopy   Stent placement 1 site  Airway dilation    Indication:  tracheobronchomalacia    Attending of Record:  Rod Desai MD    Interventional Pulmonary Fellow   None    Trainees Present:   None     Medications:    General Anesthesia - See anesthesia flowsheet for details    Sedation Time:   Per Anesthesia Care Provider    Time Out:  Performed    The patient's medical record has been reviewed.  The indication for the procedure was reviewed.  The necessary history and physical examination was performed and reviewed.  The risks, benefits and alternatives of the procedure were discussed with the the patient in detail and he had the opportunity to ask questions.  I discussed in particular the potential complications including risks of minor or life-threatening bleeding and/or infection, respiratory failure, vocal cord trauma / paralysis, pneumothorax, and discomfort. Sedation risks were also discussed including abnormal heart rhythms, low blood pressure, and respiratory failure. All questions were answered to the best of my ability.  Verbal and written informed consent was obtained.  The proposed procedure and the patient's identification were verified prior to the procedure by the physician and the surgical team.    The patient was assessed for the adequacy for the procedure and to receive medications.   Mental Status:  Alert and oriented x 3  Airway examination:  Class III (Visualization of only the base of uvula)  Pulmonary:  Decreased breathsound throughout  CV:  RRR, no murmurs or gallops  ASA Grade:  (II)  Mild systemic disease    After clinical evaluation and reviewing the indication, risks, alternatives and benefits of the procedure the patient was deemed to be in satisfactory condition to undergo the procedure.           A Tuberculosis risk assessment was performed:  The patient has no known RISK of Tuberculosis    The  procedure was performed in a negative airflow room: The patient could not be moved to a negative airflow room because of needed OR for the procedure    Maneuvers / Procedure:      A Flexible and 12mm Rigid bronchoscope bronchoscope was used for the procedure. The rigid bronchoscope was inserted into the mouth. Uvula, epiglottis and vocal cords were seen. The scope was advanced turning the bevel to 90 degress while passing through the cords and into the trachea.     Airway dilation: Airway dilation#1: The Trachea  airway was dilated to 18mm . Each dilation was held for 5 seconds and repeated once.    Airway Examination: A complete airway examination was performed from the distal trachea to the subsegmental level in each lobe of both lungs.  Pertinent findings include known malacia.         Stent placement: Stent#1:Thoracent Y-stent 18x40, 12x30 L, 12x15R  was placed in the mainstem using fluoroscopic guidance. The rigid forceps was used to finalize the position of the stent.    Relevant Pictures  Proximal tracheal limb    R main bronchus    L main bronchus      Recommendations:     -->  0.9% saline nebs twice daily - ordred  -->  follow up with Dr. Dumont  -->  Plan for stent removal in 2 weeks.  -->  In the unlikely event of respiratory failure this would be due to mucus plugging. He can be intubated with a normal sized tube just below the cords and placement should be confirmed with bronchoscope.  It will probably pass directly into the stent but could wind up between airway stent and wall and would need to be retracted.      Rod Desai MD

## 2024-04-05 NOTE — BRIEF OP NOTE
[facility]    Brief Operative Note    Pre-operative diagnosis: Tracheobronchomalacia [J39.8]  Post-operative diagnosis Same as pre-operative diagnosis    Procedure: BRONCHOSCOPY, RIGID, flexible bronchoscopy , stent placement, N/A - Bronchus    Surgeon: Surgeon(s) and Role:     * Rod Desai MD - Primary  Anesthesia: General   Estimated Blood Loss: None    Drains: None  Specimens: * No specimens in log *  Findings:   TBM .  Complications: None.  Implants: Thoracent placed

## 2024-04-05 NOTE — DISCHARGE INSTRUCTIONS
Post Bronchoscopy Patient Instructions:    April 5, 2024  Brendan CHILDS Scooter    Your procedure was completed (bronchoscopy with stent placement) without any immediate complications.    Use your nebulizer with saline twice daily.    You may cough up scant amount of blood for the next 12-24 hours. If you have excessive cough with blood, chest pain, shortness of breath, please report to the closest emergency room.    You may experience low grade (less than 100.5 F) fever next 24 hours, if so, you can take Tylenol. If the fever persists more than 24 hours, please contact to our office or your primary care provider.    Follow up with Dr. Dumont.    You may resume your diet as it was prior to procedure.    You may resume your medications after the procedure unless you are instructed to do differently.     Please follow instructions from the nursing staff upon discharge in terms of activity. In general, you should avoid any attention or motor skill requiring activities (e.g., driving or operating any motorized vehicle) for 24 hours as you might be still under the effect of sedation medications. Please make sure an adult to accompany you next 24 hours.     Should you have any question, please do not hesitate to call our office.    Rod Desai MD    Contacting your Doctor -   To contact a doctor, call Dr Desai's clinic at [phone], or  [phone] and ask for the resident on call for Pulmonology (answered 24 hours a day)   Emergency Department:  [facility]: [phone]  [facility]: [phone] 911 if you are in need of immediate or emergent help

## 2024-04-05 NOTE — ANESTHESIA POSTPROCEDURE EVALUATION
Patient: Brendan Helms    Procedure: Procedure(s):  BRONCHOSCOPY, RIGID, flexible bronchoscopy , stent placement       Anesthesia Type:  General    Note:  Disposition: Outpatient   Postop Pain Control:    PONV: No   Neuro/Psych: Uneventful            Sign Out: Acceptable/Baseline neuro status   Airway/Respiratory: Uneventful            Sign Out: Acceptable/Baseline resp. status   CV/Hemodynamics: Uneventful            Sign Out: Acceptable CV status; No obvious hypovolemia; No obvious fluid overload   Other NRE: NONE   DID A NON-ROUTINE EVENT OCCUR? No           Last vitals:  Vitals Value Taken Time   /92 04/05/24 1030   Temp     Pulse 86 04/05/24 1035   Resp 11 04/05/24 1035   SpO2 92 % 04/05/24 1035   Vitals shown include unfiled device data.    Electronically Signed By: Piyush Avalos MD  April 5, 2024  10:35 AM

## 2024-04-05 NOTE — ANESTHESIA PREPROCEDURE EVALUATION
Anesthesia Pre-Procedure Evaluation    Patient: Brendan Helms   MRN: [id number] : 1970        Procedure : Procedure(s):  BRONCHOSCOPY, RIGID, flexible bronchoscopy , stent placement          Patient shares history of windpipe collapsing Surgery will be for trial stent in throat for 2 weeks to see if this helps with semi-sedation.      Diabetes Mellitus: Yes-insulin treated/well controlled  Dyspnea: No  Functional Health Status: independent  Chronic Pain: No  Steroid use for chronic condition: No  Bleeding disorders: No  Actively managed cancer: No  Able to walk 200 feet (2 blocks) without stopping due to symptoms? Yes - > 4 METS capacity.       Past Medical History:   Diagnosis Date    Diabetes (H)       Past Surgical History:   Procedure Laterality Date    BRONCHOSCOPY (RIGID OR FLEXIBLE), DIAGNOSTIC N/A 2023    Procedure: BRONCHOSCOPY, WITH BRONCHOALVEOLAR LAVAGE;  Surgeon: Rod Desai MD;  Location:  GI      Allergies   Allergen Reactions    Amoxicillin-Pot Clavulanate Hives      Social History     Tobacco Use    Smoking status: Never    Smokeless tobacco: Never   Substance Use Topics    Alcohol use: Not on file      Wt Readings from Last 1 Encounters:   23 108.6 kg (239 lb 6.4 oz)        Anesthesia Evaluation            ROS/MED HX  ENT/Pulmonary:       Neurologic:       Cardiovascular:     (+) Dyslipidemia - -   -  - -                                      METS/Exercise Tolerance:     Hematologic:       Musculoskeletal:       GI/Hepatic:       Renal/Genitourinary:       Endo:     (+) type I DM,                     Psychiatric/Substance Use:       Infectious Disease:       Malignancy:       Other:            Physical Exam    Airway        Mallampati: III   TM distance: > 3 FB   Neck ROM: full   Mouth opening: > 3 cm    Respiratory Devices and Support         Dental       (+) Completely normal teeth      Cardiovascular   cardiovascular exam normal          Pulmonary           (+)  "decreased breath sounds           OUTSIDE LABS:  CBC: No results found for: \"WBC\", \"HGB\", \"HCT\", \"PLT\"  BMP:   Lab Results   Component Value Date    GLC 50 (LL) 07/13/2023    GLC 86 07/13/2023     COAGS: No results found for: \"PTT\", \"INR\", \"FIBR\"  POC: No results found for: \"BGM\", \"HCG\", \"HCGS\"  HEPATIC: No results found for: \"ALBUMIN\", \"PROTTOTAL\", \"ALT\", \"AST\", \"GGT\", \"ALKPHOS\", \"BILITOTAL\", \"BILIDIRECT\", \"JEANMARIE\"  OTHER: No results found for: \"PH\", \"LACT\", \"A1C\", \"ANA CRISTINA\", \"PHOS\", \"MAG\", \"LIPASE\", \"AMYLASE\", \"TSH\", \"T4\", \"T3\", \"CRP\", \"SED\"    Anesthesia Plan    ASA Status:  2    NPO Status:  NPO Appropriate    Anesthesia Type: General.     - Airway: Native airway   Induction: Propofol.   Maintenance: TIVA.        Consents    Anesthesia Plan(s) and associated risks, benefits, and realistic alternatives discussed. Questions answered and patient/representative(s) expressed understanding.     - Discussed: Risks, Benefits and Alternatives for BOTH SEDATION and the PROCEDURE were discussed     - Discussed with:  Patient      - Extended Intubation/Ventilatory Support Discussed: No.      - Patient is DNR/DNI Status: No     Use of blood products discussed: No .     Postoperative Care    Pain management: IV analgesics.   PONV prophylaxis: Ondansetron (or other 5HT-3), Dexamethasone or Solumedrol     Comments:               Piyush Avalos MD    I have reviewed the pertinent notes and labs in the chart from the past 30 days and (re)examined the patient.  Any updates or changes from those notes are reflected in this note.                  "

## 2024-04-05 NOTE — OR NURSING
Preop blood glucose 204. Dr. Avalos bedside to assess patient. Per Dr. Avalos, patient gave 8 units Insulin Aspart subcutaneously using insulin pen brought from home.

## 2024-04-06 ASSESSMENT — ACTIVITIES OF DAILY LIVING (ADL)
ADLS_ACUITY_SCORE: 18

## 2024-04-07 ASSESSMENT — ACTIVITIES OF DAILY LIVING (ADL)
ADLS_ACUITY_SCORE: 18

## 2024-04-08 ASSESSMENT — ACTIVITIES OF DAILY LIVING (ADL)
ADLS_ACUITY_SCORE: 18

## 2024-04-09 ASSESSMENT — ACTIVITIES OF DAILY LIVING (ADL)
ADLS_ACUITY_SCORE: 18

## 2024-04-10 ENCOUNTER — ONCOLOGY VISIT (OUTPATIENT)
Dept: SURGERY | Facility: CLINIC | Age: 54
End: 2024-04-10
Attending: THORACIC SURGERY (CARDIOTHORACIC VASCULAR SURGERY)
Payer: COMMERCIAL

## 2024-04-10 ENCOUNTER — PREP FOR PROCEDURE (OUTPATIENT)
Dept: SURGERY | Facility: CLINIC | Age: 54
End: 2024-04-10

## 2024-04-10 VITALS
WEIGHT: 235.3 LBS | TEMPERATURE: 97.5 F | RESPIRATION RATE: 16 BRPM | OXYGEN SATURATION: 96 % | DIASTOLIC BLOOD PRESSURE: 79 MMHG | SYSTOLIC BLOOD PRESSURE: 135 MMHG | BODY MASS INDEX: 31.91 KG/M2 | HEART RATE: 93 BPM

## 2024-04-10 DIAGNOSIS — J39.8 TRACHEOBRONCHOMALACIA: Primary | ICD-10-CM

## 2024-04-10 PROCEDURE — 99215 OFFICE O/P EST HI 40 MIN: CPT | Performed by: THORACIC SURGERY (CARDIOTHORACIC VASCULAR SURGERY)

## 2024-04-10 PROCEDURE — 99213 OFFICE O/P EST LOW 20 MIN: CPT | Performed by: THORACIC SURGERY (CARDIOTHORACIC VASCULAR SURGERY)

## 2024-04-10 RX ORDER — ACETAMINOPHEN 325 MG/1
975 TABLET ORAL ONCE
Status: CANCELLED | OUTPATIENT
Start: 2024-04-10 | End: 2024-04-10

## 2024-04-10 ASSESSMENT — PAIN SCALES - GENERAL: PAINLEVEL: NO PAIN (0)

## 2024-04-10 NOTE — LETTER
4/10/2024         RE: Brendan Helms  [address]  [place] [address]        Dear Colleague,    Thank you for referring your patient, Brendan Helms, to the [facility]. Please see a copy of my visit note below.    THORACIC SURGERY FOLLOW UP VISIT    I saw Mr. Brendan Helms in follow-up today. The clinical summary follows:     DIAGNOSIS   TBM and EDAC    INTERVAL STUDIES  Stent trial 4/5/24: Stent#1:Thoracent Y-stent 18x40, 12x30 L, 12x15R  was placed in the mainstem using fluoroscopic guidance. The rigid forceps was used to finalize the position of the stent.   Proximal tracheal limb      Previsit tests  PFT 8/18/23: FEV1 3.2L, 78%. DLCO 95%.   CT scan 9/20/23:            7/13/23 Bronchoscopy: 90% collapse affecting trachea and LMSB, 80% collapse of RMSB.     4/28/23 Echo  Technically difficult echocardiogram.   The left ventricular systolic function is normal.   Left ventricular diastolic function is normal.   No significant valvular stenosis or insufficiency seen.   TR signal inadequate to allow accurate estimate RV systolic pressure.   There is no pericardial effusion.     Past Medical History:   Diagnosis Date    Diabetes (H)      Past Surgical History:   Procedure Laterality Date    BRONCHOSCOPY (RIGID OR FLEXIBLE), DIAGNOSTIC N/A 7/13/2023    Procedure: BRONCHOSCOPY, WITH BRONCHOALVEOLAR LAVAGE;  Surgeon: Rod Desai MD;  Location: [facility]    BRONCHOSCOPY, DILATE BRONCHUS, STENT BRONCHUS, COMBINED N/A 4/5/2024    Procedure: BRONCHOSCOPY, RIGID, flexible bronchoscopy , stent placement;  Surgeon: Rod Desai MD;  Location: [facility] OR          Allergies   Allergen Reactions    Amoxicillin-Pot Clavulanate Hives     No current facility-administered medications for this visit.     Current Outpatient Medications   Medication Sig Dispense Refill    guaiFENesin-codeine (GUAIFENESIN AC) 100-10 MG/5ML syrup Take 5 mLs by mouth every 4 hours as needed for congestion 237 mL 0     sodium chloride 0.9 % neb solution Take 3 mLs by nebulization 2 times daily 120 mL 0     Facility-Administered Medications Ordered in Other Visits   Medication Dose Route Frequency Provider Last Rate Last Admin    albuterol (PROVENTIL HFA/VENTOLIN HFA) inhaler  2 puff Inhalation Once PRPiyush Aguilar MD        naloxone (NARCAN) injection 0.1 mg  0.1 mg Intravenous Q2 Min PRN Piyush Avalos MD        ondansetron (ZOFRAN ODT) ODT tab 4 mg  4 mg Oral Q30 Min PRN Piyush Avalos MD        Or    ondansetron (ZOFRAN) injection 4 mg  4 mg Intravenous Q30 Min PRN Piyush Avalos MD        oxyCODONE IR (ROXICODONE) tablet 10 mg  10 mg Oral Once PRN Piyush Avalos MD        prochlorperazine (COMPAZINE) injection 5 mg  5 mg Intravenous Q6H PRN Piyush Avalos MD         Social History     Tobacco Use    Smoking status: Never    Smokeless tobacco: Never   Vaping Use    Vaping Use: Never used       Exam   /79 (BP Location: Right arm, Patient Position: Sitting, Cuff Size: Adult Regular)   Pulse 93   Temp 97.5  F (36.4  C) (Oral)   Resp 16   Wt 106.7 kg (235 lb 4.8 oz)   SpO2 96%   BMI 31.91 kg/m    Alert and oriented NAD.   Bilateral breath sounds.     SUBJECTIVE  Rigoberto comes to clinic for a follow up after a stent trial. He found significant improvement while the stent is in, although he has some cough and inability to clear secretions.     His main symptoms are exertional dyspnea which is impacting his life significantly. He can't even shovel snow for more than 5 min without getting winded. He has mild coughing spells in the morning soon after he gets in the shower. No recurrent pulmonary infections. No heartburn or regurgitation.      He does not use a CPAP machine and is not willing to try it because he believes it will interfere with his ability to get a 's license that he needs for his work.     From a personal perspective, he comes to clinic alone. 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     IMPRESSION   53 y/o with TBM and EDAC.     PLAN  I spent 60 min on the date of the encounter in chart review, patient visit, review of tests, documentation and/or discussion with other providers about the issues documented above. I reviewed the plan as follows:  Procedure planned:  Right VATS possible open TBP.   Pain Control Plan: intercostal nerve cryoablation.  Anticoagulation Plan: Enoxaparin postop.    I had an extensive discussion with the patient and his family regarding the rationale for surgery, the alternatives risks and benefits of the procedure. I explained the expected hospital stay, postoperative course, recovery time, diet and activity restrictions. Informed consent was obtained and they agreed to proceed.  All questions were answered and the patient and present family were in agreement with the plan.  I appreciate the opportunity to participate in the care of your patient and will keep you updated.  Sincerely,  Lisa Ochoa MD

## 2024-04-10 NOTE — NURSING NOTE
Oncology Rooming Note    April 10, 2024 8:49 AM   Brendan Helms is a 53 year old male who presents for:    Chief Complaint   Patient presents with    Oncology Clinic Visit     Tracheobronchomalacia stent trial     Initial Vitals: /79 (BP Location: Right arm, Patient Position: Sitting, Cuff Size: Adult Regular)   Pulse 93   Temp 97.5  F (36.4  C) (Oral)   Resp 16   Wt 106.7 kg (235 lb 4.8 oz)   SpO2 96%   BMI 31.91 kg/m   Estimated body mass index is 31.91 kg/m  as calculated from the following:    Height as of 4/5/24: 1.829 m (6').    Weight as of this encounter: 106.7 kg (235 lb 4.8 oz). Body surface area is 2.33 meters squared.  No Pain (0) Comment: Data Unavailable   No LMP for male patient.  Allergies reviewed: Yes  Medications reviewed: Yes    Medications: Medication refills not needed today.  Pharm[facility] entered into walkby[facility] [place] [facility] [address] [facility]    Frailty Screening:   Is the patient here for a new oncology consult visit in cancer care? 2. No      Clinical concerns: none       Ariella Maxwell

## 2024-04-10 NOTE — PROGRESS NOTES
THORACIC SURGERY FOLLOW UP VISIT    I saw Mr. Brendan Helms in follow-up today. The clinical summary follows:     DIAGNOSIS   TBM and EDAC    INTERVAL STUDIES  Stent trial 4/5/24: Stent#1:Thoracent Y-stent 18x40, 12x30 L, 12x15R  was placed in the mainstem using fluoroscopic guidance. The rigid forceps was used to finalize the position of the stent.   Proximal tracheal limb      Previsit tests  PFT 8/18/23: FEV1 3.2L, 78%. DLCO 95%.   CT scan 9/20/23:            7/13/23 Bronchoscopy: 90% collapse affecting trachea and LMSB, 80% collapse of RMSB.     4/28/23 Echo  Technically difficult echocardiogram.   The left ventricular systolic function is normal.   Left ventricular diastolic function is normal.   No significant valvular stenosis or insufficiency seen.   TR signal inadequate to allow accurate estimate RV systolic pressure.   There is no pericardial effusion.     Past Medical History:   Diagnosis Date    Diabetes (H)      Past Surgical History:   Procedure Laterality Date    BRONCHOSCOPY (RIGID OR FLEXIBLE), DIAGNOSTIC N/A 7/13/2023    Procedure: BRONCHOSCOPY, WITH BRONCHOALVEOLAR LAVAGE;  Surgeon: Rod Desai MD;  Location: [facility]    BRONCHOSCOPY, DILATE BRONCHUS, STENT BRONCHUS, COMBINED N/A 4/5/2024    Procedure: BRONCHOSCOPY, RIGID, flexible bronchoscopy , stent placement;  Surgeon: Rod Desai MD;  Location: [facility]          Allergies   Allergen Reactions    Amoxicillin-Pot Clavulanate Hives     No current facility-administered medications for this visit.     Current Outpatient Medications   Medication Sig Dispense Refill    guaiFENesin-codeine (GUAIFENESIN AC) 100-10 MG/5ML syrup Take 5 mLs by mouth every 4 hours as needed for congestion 237 mL 0    sodium chloride 0.9 % neb solution Take 3 mLs by nebulization 2 times daily 120 mL 0     Facility-Administered Medications Ordered in Other Visits   Medication Dose Route Frequency Provider Last Rate Last Admin    albuterol (PROVENTIL HFA/VENTOLIN HFA)  inhaler  2 puff Inhalation Once PRN Piyush Avalos MD        naloxone (NARCAN) injection 0.1 mg  0.1 mg Intravenous Q2 Min PRN Piyush Avalos MD        ondansetron (ZOFRAN ODT) ODT tab 4 mg  4 mg Oral Q30 Min PRN Piyush Avalos MD        Or    ondansetron (ZOFRAN) injection 4 mg  4 mg Intravenous Q30 Min PRN Piyush Avalos MD        oxyCODONE IR (ROXICODONE) tablet 10 mg  10 mg Oral Once PRN Piyush Avalos MD        prochlorperazine (COMPAZINE) injection 5 mg  5 mg Intravenous Q6H PRN Piyush Avalos MD         Social History     Tobacco Use    Smoking status: Never    Smokeless tobacco: Never   Vaping Use    Vaping Use: Never used       Exam   /79 (BP Location: Right arm, Patient Position: Sitting, Cuff Size: Adult Regular)   Pulse 93   Temp 97.5  F (36.4  C) (Oral)   Resp 16   Wt 106.7 kg (235 lb 4.8 oz)   SpO2 96%   BMI 31.91 kg/m    Alert and oriented NAD.   Bilateral breath sounds.     SUBJECTIVE  Rigoberto comes to clinic for a follow up after a stent trial. He found significant improvement while the stent is in, although he has some cough and inability to clear secretions.     His main symptoms are exertional dyspnea which is impacting his life significantly. He can't even shovel snow for more than 5 min without getting winded. He has mild coughing spells in the morning soon after he gets in the shower. No recurrent pulmonary infections. No heartburn or regurgitation.      He does not use a CPAP machine and is not willing to try it because he believes it will interfere with his ability to get a 's license that he needs for his work.     From a personal perspective, he comes to clinic alone. 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     IMPRESSION   51 y/o with TBM and EDAC.     PLAN  I spent 60 min on the date of the encounter in chart review, patient visit, review of tests, documentation and/or discussion with other  providers about the issues documented above. I reviewed the plan as follows:  Procedure planned:  Right VATS possible open TBP.   Pain Control Plan: intercostal nerve cryoablation.  Anticoagulation Plan: Enoxaparin postop.    I had an extensive discussion with the patient and his family regarding the rationale for surgery, the alternatives risks and benefits of the procedure. I explained the expected hospital stay, postoperative course, recovery time, diet and activity restrictions. Informed consent was obtained and they agreed to proceed.  All questions were answered and the patient and present family were in agreement with the plan.  I appreciate the opportunity to participate in the care of your patient and will keep you updated.  Sincerely,  Lisa Ochoa MD

## 2024-04-11 DIAGNOSIS — J39.8 TRACHEOBRONCHOMALACIA: Primary | ICD-10-CM

## 2024-04-11 DIAGNOSIS — Z01.818 PRE-OP EXAMINATION: ICD-10-CM

## 2024-04-12 ENCOUNTER — TELEPHONE (OUTPATIENT)
Dept: SURGERY | Facility: CLINIC | Age: 54
End: 2024-04-12
Payer: COMMERCIAL

## 2024-04-12 NOTE — TELEPHONE ENCOUNTER
Called and spoke with pt to let him know surgery date is being worked on and writer is waiting to hear back from the surgeon on a timeline due to co surgeon being needed . Pt stated he has not been able to go back to work because of the stent in his throat. He is needing a letter excusing him from work due to this.    Routing to nurse Linda.    Kathe Pantoja on 4/12/2024 at 2:28 PM

## 2024-04-15 ENCOUNTER — TELEPHONE (OUTPATIENT)
Dept: PULMONOLOGY | Facility: CLINIC | Age: 54
End: 2024-04-15
Payer: COMMERCIAL

## 2024-04-15 NOTE — LETTER
April 15, 2024      Brendan Helms  [address] [place] [address]        To Whom It May Concern:    Brendan Helms  was seen on April 5th, 2024.  Please excuse him  until April 20th, 2024 due to surgery.        Sincerely,        Dr. Rod Desai  Interventional Pulmonology  [facility]

## 2024-04-15 NOTE — TELEPHONE ENCOUNTER
[facility] Call Center    Phone Message    May a detailed message be left on voicemail: no     Reason for Call: Other: Pt calling to request and location on where he needs to be for his procedure on 04/19/2024. Please follow up with pt asap.       Action Taken: Other: pulm    Travel Screening: Not Applicable

## 2024-04-19 ENCOUNTER — ANESTHESIA EVENT (OUTPATIENT)
Dept: SURGERY | Facility: CLINIC | Age: 54
End: 2024-04-19
Payer: COMMERCIAL

## 2024-04-19 ENCOUNTER — ANESTHESIA (OUTPATIENT)
Dept: SURGERY | Facility: CLINIC | Age: 54
End: 2024-04-19
Payer: COMMERCIAL

## 2024-04-19 ENCOUNTER — HOSPITAL ENCOUNTER (OUTPATIENT)
Facility: CLINIC | Age: 54
Discharge: HOME | End: 2024-04-19
Attending: INTERNAL MEDICINE | Admitting: INTERNAL MEDICINE
Payer: COMMERCIAL

## 2024-04-19 VITALS
HEIGHT: 72 IN | DIASTOLIC BLOOD PRESSURE: 92 MMHG | HEART RATE: 78 BPM | BODY MASS INDEX: 31.17 KG/M2 | RESPIRATION RATE: 16 BRPM | WEIGHT: 230.16 LBS | OXYGEN SATURATION: 97 % | SYSTOLIC BLOOD PRESSURE: 158 MMHG | TEMPERATURE: 98.1 F

## 2024-04-19 LAB
ANION GAP SERPL CALCULATED.3IONS-SCNC: 11 MMOL/L (ref 7–15)
BUN SERPL-MCNC: 13.8 MG/DL (ref 6–20)
CALCIUM SERPL-MCNC: 9.4 MG/DL (ref 8.6–10)
CHLORIDE SERPL-SCNC: 100 MMOL/L (ref 98–107)
CREAT SERPL-MCNC: 0.95 MG/DL (ref 0.67–1.17)
DEPRECATED HCO3 PLAS-SCNC: 24 MMOL/L (ref 22–29)
EGFRCR SERPLBLD CKD-EPI 2021: >90 ML/MIN/1.73M2
GLUCOSE BLDC GLUCOMTR-MCNC: 101 MG/DL (ref 70–99)
GLUCOSE BLDC GLUCOMTR-MCNC: 144 MG/DL (ref 70–99)
GLUCOSE BLDC GLUCOMTR-MCNC: 238 MG/DL (ref 70–99)
GLUCOSE BLDC GLUCOMTR-MCNC: 26 MG/DL (ref 70–99)
GLUCOSE BLDC GLUCOMTR-MCNC: 39 MG/DL (ref 70–99)
GLUCOSE BLDC GLUCOMTR-MCNC: 50 MG/DL (ref 70–99)
GLUCOSE BLDC GLUCOMTR-MCNC: 91 MG/DL (ref 70–99)
GLUCOSE SERPL-MCNC: 217 MG/DL (ref 70–99)
HOLD SPECIMEN: NORMAL
POTASSIUM SERPL-SCNC: 4 MMOL/L (ref 3.4–5.3)
SODIUM SERPL-SCNC: 135 MMOL/L (ref 135–145)

## 2024-04-19 PROCEDURE — 80048 BASIC METABOLIC PNL TOTAL CA: CPT | Performed by: ANESTHESIOLOGY

## 2024-04-19 PROCEDURE — 250N000013 HC RX MED GY IP 250 OP 250 PS 637: Performed by: STUDENT IN AN ORGANIZED HEALTH CARE EDUCATION/TRAINING PROGRAM

## 2024-04-19 PROCEDURE — 82962 GLUCOSE BLOOD TEST: CPT

## 2024-04-19 PROCEDURE — 31631 BRONCHOSCOPY DILATE W/STENT: CPT | Performed by: ANESTHESIOLOGY

## 2024-04-19 PROCEDURE — 31631 BRONCHOSCOPY DILATE W/STENT: CPT | Performed by: NURSE ANESTHETIST, CERTIFIED REGISTERED

## 2024-04-19 PROCEDURE — 710N000010 HC RECOVERY PHASE 1, LEVEL 2, PER MIN: Performed by: INTERNAL MEDICINE

## 2024-04-19 PROCEDURE — 250N000011 HC RX IP 250 OP 636: Performed by: NURSE ANESTHETIST, CERTIFIED REGISTERED

## 2024-04-19 PROCEDURE — 250N000011 HC RX IP 250 OP 636: Performed by: INTERNAL MEDICINE

## 2024-04-19 PROCEDURE — 250N000009 HC RX 250: Performed by: ANESTHESIOLOGY

## 2024-04-19 PROCEDURE — 250N000009 HC RX 250: Performed by: NURSE ANESTHETIST, CERTIFIED REGISTERED

## 2024-04-19 PROCEDURE — 272N000001 HC OR GENERAL SUPPLY STERILE: Performed by: INTERNAL MEDICINE

## 2024-04-19 PROCEDURE — 370N000017 HC ANESTHESIA TECHNICAL FEE, PER MIN: Performed by: INTERNAL MEDICINE

## 2024-04-19 PROCEDURE — 31635 BRONCHOSCOPY W/FB REMOVAL: CPT | Mod: GC | Performed by: INTERNAL MEDICINE

## 2024-04-19 PROCEDURE — 258N000001 HC RX 258: Performed by: STUDENT IN AN ORGANIZED HEALTH CARE EDUCATION/TRAINING PROGRAM

## 2024-04-19 PROCEDURE — 250N000013 HC RX MED GY IP 250 OP 250 PS 637: Performed by: THORACIC SURGERY (CARDIOTHORACIC VASCULAR SURGERY)

## 2024-04-19 PROCEDURE — 94640 AIRWAY INHALATION TREATMENT: CPT

## 2024-04-19 PROCEDURE — 258N000003 HC RX IP 258 OP 636: Performed by: NURSE ANESTHETIST, CERTIFIED REGISTERED

## 2024-04-19 PROCEDURE — 999N000141 HC STATISTIC PRE-PROCEDURE NURSING ASSESSMENT: Performed by: INTERNAL MEDICINE

## 2024-04-19 PROCEDURE — 710N000012 HC RECOVERY PHASE 2, PER MINUTE: Performed by: INTERNAL MEDICINE

## 2024-04-19 PROCEDURE — 360N000083 HC SURGERY LEVEL 3 W/ FLUORO, PER MIN: Performed by: INTERNAL MEDICINE

## 2024-04-19 PROCEDURE — 31641 BRONCHOSCOPY TREAT BLOCKAGE: CPT | Mod: GC | Performed by: INTERNAL MEDICINE

## 2024-04-19 PROCEDURE — 258N000003 HC RX IP 258 OP 636: Performed by: INTERNAL MEDICINE

## 2024-04-19 RX ORDER — PROPOFOL 10 MG/ML
INJECTION, EMULSION INTRAVENOUS PRN
Status: DISCONTINUED | OUTPATIENT
Start: 2024-04-19 | End: 2024-04-19

## 2024-04-19 RX ORDER — FENTANYL CITRATE 50 UG/ML
INJECTION, SOLUTION INTRAMUSCULAR; INTRAVENOUS PRN
Status: DISCONTINUED | OUTPATIENT
Start: 2024-04-19 | End: 2024-04-19

## 2024-04-19 RX ORDER — DEXAMETHASONE SODIUM PHOSPHATE 4 MG/ML
INJECTION, SOLUTION INTRA-ARTICULAR; INTRALESIONAL; INTRAMUSCULAR; INTRAVENOUS; SOFT TISSUE PRN
Status: DISCONTINUED | OUTPATIENT
Start: 2024-04-19 | End: 2024-04-19

## 2024-04-19 RX ORDER — NICOTINE POLACRILEX 4 MG
15-30 LOZENGE BUCCAL
Status: DISCONTINUED | OUTPATIENT
Start: 2024-04-19 | End: 2024-04-19 | Stop reason: HOSPADM

## 2024-04-19 RX ORDER — HYDROMORPHONE HCL IN WATER/PF 6 MG/30 ML
0.2 PATIENT CONTROLLED ANALGESIA SYRINGE INTRAVENOUS EVERY 5 MIN PRN
Status: DISCONTINUED | OUTPATIENT
Start: 2024-04-19 | End: 2024-04-19 | Stop reason: HOSPADM

## 2024-04-19 RX ORDER — OXYCODONE HYDROCHLORIDE 5 MG/1
5 TABLET ORAL
Status: DISCONTINUED | OUTPATIENT
Start: 2024-04-19 | End: 2024-04-19 | Stop reason: HOSPADM

## 2024-04-19 RX ORDER — SODIUM CHLORIDE, SODIUM LACTATE, POTASSIUM CHLORIDE, CALCIUM CHLORIDE 600; 310; 30; 20 MG/100ML; MG/100ML; MG/100ML; MG/100ML
INJECTION, SOLUTION INTRAVENOUS CONTINUOUS PRN
Status: DISCONTINUED | OUTPATIENT
Start: 2024-04-19 | End: 2024-04-19

## 2024-04-19 RX ORDER — CLINDAMYCIN PHOSPHATE 900 MG/50ML
900 INJECTION, SOLUTION INTRAVENOUS SEE ADMIN INSTRUCTIONS
Status: DISCONTINUED | OUTPATIENT
Start: 2024-04-19 | End: 2024-04-19 | Stop reason: HOSPADM

## 2024-04-19 RX ORDER — LABETALOL 20 MG/4 ML (5 MG/ML) INTRAVENOUS SYRINGE
PRN
Status: DISCONTINUED | OUTPATIENT
Start: 2024-04-19 | End: 2024-04-19

## 2024-04-19 RX ORDER — ONDANSETRON 4 MG/1
4 TABLET, ORALLY DISINTEGRATING ORAL EVERY 30 MIN PRN
Status: DISCONTINUED | OUTPATIENT
Start: 2024-04-19 | End: 2024-04-19 | Stop reason: HOSPADM

## 2024-04-19 RX ORDER — FENTANYL CITRATE 50 UG/ML
50 INJECTION, SOLUTION INTRAMUSCULAR; INTRAVENOUS EVERY 5 MIN PRN
Status: DISCONTINUED | OUTPATIENT
Start: 2024-04-19 | End: 2024-04-19 | Stop reason: HOSPADM

## 2024-04-19 RX ORDER — NALOXONE HYDROCHLORIDE 0.4 MG/ML
0.1 INJECTION, SOLUTION INTRAMUSCULAR; INTRAVENOUS; SUBCUTANEOUS
Status: DISCONTINUED | OUTPATIENT
Start: 2024-04-19 | End: 2024-04-19 | Stop reason: HOSPADM

## 2024-04-19 RX ORDER — ONDANSETRON 2 MG/ML
4 INJECTION INTRAMUSCULAR; INTRAVENOUS EVERY 30 MIN PRN
Status: DISCONTINUED | OUTPATIENT
Start: 2024-04-19 | End: 2024-04-19 | Stop reason: HOSPADM

## 2024-04-19 RX ORDER — BENZONATATE 100 MG/1
100 CAPSULE ORAL ONCE
Status: COMPLETED | OUTPATIENT
Start: 2024-04-19 | End: 2024-04-19

## 2024-04-19 RX ORDER — ONDANSETRON 2 MG/ML
INJECTION INTRAMUSCULAR; INTRAVENOUS PRN
Status: DISCONTINUED | OUTPATIENT
Start: 2024-04-19 | End: 2024-04-19

## 2024-04-19 RX ORDER — LIDOCAINE HYDROCHLORIDE 20 MG/ML
INJECTION, SOLUTION INFILTRATION; PERINEURAL PRN
Status: DISCONTINUED | OUTPATIENT
Start: 2024-04-19 | End: 2024-04-19

## 2024-04-19 RX ORDER — HYDROMORPHONE HCL IN WATER/PF 6 MG/30 ML
0.4 PATIENT CONTROLLED ANALGESIA SYRINGE INTRAVENOUS EVERY 5 MIN PRN
Status: DISCONTINUED | OUTPATIENT
Start: 2024-04-19 | End: 2024-04-19 | Stop reason: HOSPADM

## 2024-04-19 RX ORDER — FENTANYL CITRATE 50 UG/ML
25 INJECTION, SOLUTION INTRAMUSCULAR; INTRAVENOUS EVERY 5 MIN PRN
Status: DISCONTINUED | OUTPATIENT
Start: 2024-04-19 | End: 2024-04-19 | Stop reason: HOSPADM

## 2024-04-19 RX ORDER — OXYCODONE HYDROCHLORIDE 10 MG/1
10 TABLET ORAL
Status: DISCONTINUED | OUTPATIENT
Start: 2024-04-19 | End: 2024-04-19 | Stop reason: HOSPADM

## 2024-04-19 RX ORDER — DEXTROSE MONOHYDRATE 25 G/50ML
25-50 INJECTION, SOLUTION INTRAVENOUS
Status: DISCONTINUED | OUTPATIENT
Start: 2024-04-19 | End: 2024-04-19 | Stop reason: HOSPADM

## 2024-04-19 RX ORDER — ACETAMINOPHEN 325 MG/1
975 TABLET ORAL ONCE
Status: COMPLETED | OUTPATIENT
Start: 2024-04-19 | End: 2024-04-19

## 2024-04-19 RX ORDER — CLINDAMYCIN PHOSPHATE 900 MG/50ML
900 INJECTION, SOLUTION INTRAVENOUS
Status: DISCONTINUED | OUTPATIENT
Start: 2024-04-19 | End: 2024-04-19 | Stop reason: HOSPADM

## 2024-04-19 RX ORDER — SODIUM CHLORIDE, SODIUM LACTATE, POTASSIUM CHLORIDE, CALCIUM CHLORIDE 600; 310; 30; 20 MG/100ML; MG/100ML; MG/100ML; MG/100ML
INJECTION, SOLUTION INTRAVENOUS CONTINUOUS
Status: DISCONTINUED | OUTPATIENT
Start: 2024-04-19 | End: 2024-04-19 | Stop reason: HOSPADM

## 2024-04-19 RX ORDER — PROPOFOL 10 MG/ML
INJECTION, EMULSION INTRAVENOUS CONTINUOUS PRN
Status: DISCONTINUED | OUTPATIENT
Start: 2024-04-19 | End: 2024-04-19

## 2024-04-19 RX ADMIN — DEXAMETHASONE SODIUM PHOSPHATE 10 MG: 4 INJECTION, SOLUTION INTRA-ARTICULAR; INTRALESIONAL; INTRAMUSCULAR; INTRAVENOUS; SOFT TISSUE at 11:30

## 2024-04-19 RX ADMIN — FENTANYL CITRATE 50 MCG: 50 INJECTION INTRAMUSCULAR; INTRAVENOUS at 11:15

## 2024-04-19 RX ADMIN — LABETALOL 20 MG/4 ML (5 MG/ML) INTRAVENOUS SYRINGE 5 MG: at 11:41

## 2024-04-19 RX ADMIN — MIDAZOLAM 2 MG: 1 INJECTION INTRAMUSCULAR; INTRAVENOUS at 11:04

## 2024-04-19 RX ADMIN — DEXTROSE MONOHYDRATE 25 ML: 25 INJECTION, SOLUTION INTRAVENOUS at 13:11

## 2024-04-19 RX ADMIN — BENZONATATE 100 MG: 100 CAPSULE ORAL at 14:00

## 2024-04-19 RX ADMIN — LABETALOL 20 MG/4 ML (5 MG/ML) INTRAVENOUS SYRINGE 5 MG: at 11:38

## 2024-04-19 RX ADMIN — PROPOFOL 200 MCG/KG/MIN: 10 INJECTION, EMULSION INTRAVENOUS at 11:24

## 2024-04-19 RX ADMIN — ACETAMINOPHEN 975 MG: 325 TABLET, FILM COATED ORAL at 10:37

## 2024-04-19 RX ADMIN — Medication 50 MG: at 11:22

## 2024-04-19 RX ADMIN — FENTANYL CITRATE 50 MCG: 50 INJECTION INTRAMUSCULAR; INTRAVENOUS at 11:20

## 2024-04-19 RX ADMIN — SUGAMMADEX 400 MG: 100 INJECTION, SOLUTION INTRAVENOUS at 12:00

## 2024-04-19 RX ADMIN — LIDOCAINE HYDROCHLORIDE 100 MG: 20 INJECTION, SOLUTION INFILTRATION; PERINEURAL at 11:21

## 2024-04-19 RX ADMIN — ONDANSETRON 4 MG: 2 INJECTION INTRAMUSCULAR; INTRAVENOUS at 11:30

## 2024-04-19 RX ADMIN — DEXTROSE 15 G: 15 GEL ORAL at 14:09

## 2024-04-19 RX ADMIN — LABETALOL 20 MG/4 ML (5 MG/ML) INTRAVENOUS SYRINGE 5 MG: at 11:34

## 2024-04-19 RX ADMIN — PROPOFOL 200 MG: 10 INJECTION, EMULSION INTRAVENOUS at 11:20

## 2024-04-19 RX ADMIN — LIDOCAINE HYDROCHLORIDE 3 ML: 40 INJECTION, SOLUTION RETROBULBAR; TOPICAL at 12:49

## 2024-04-19 RX ADMIN — SODIUM CHLORIDE, POTASSIUM CHLORIDE, SODIUM LACTATE AND CALCIUM CHLORIDE: 600; 310; 30; 20 INJECTION, SOLUTION INTRAVENOUS at 11:07

## 2024-04-19 ASSESSMENT — ACTIVITIES OF DAILY LIVING (ADL)
ADLS_ACUITY_SCORE: 35
ADLS_ACUITY_SCORE: 33

## 2024-04-19 NOTE — PROCEDURES
INTERVENTIONAL PULMONOLOGY       Procedure(s):    A flexible and rigid bronchoscopy   Airway exam  Airway stent removal (1 stent)  Therapeutic suctioning (2 sites)  Tissue/tumor debulking     Indication:  s/p Y tracheal (left and right mainstem stent)    Attending of Record:  Ruthy George MD     Interventional Pulmonary Fellow   Ayde Ortiz    Trainees Present:   None     Medications:    General Anesthesia - See anesthesia flowsheet for details    Sedation Time:   Per Anesthesia Care Provider    Time Out:  Performed    The patient's medical record has been reviewed.  The indication for the procedure was reviewed.  The necessary history and physical examination was performed and reviewed.  The risks, benefits and alternatives of the procedure were discussed with the the patient in detail and he had the opportunity to ask questions.  I discussed in particular the potential complications including risks of minor or life-threatening bleeding and/or infection, respiratory failure, vocal cord trauma / paralysis, pneumothorax, and discomfort. Sedation risks were also discussed including abnormal heart rhythms, low blood pressure, and respiratory failure. All questions were answered to the best of my ability.  Verbal and written informed consent was obtained.  The proposed procedure and the patient's identification were verified prior to the procedure by the physician and the nurse.    The patient was assessed for the adequacy for the procedure and to receive medications.   Mental Status:  Alert and oriented x 3  Airway examination:  Class I (Complete visualization of soft palate)  Pulmonary:  Non labored respirations  CV:  Regular rate  ASA Grade:  (II)  Mild systemic disease    After clinical evaluation and reviewing the indication, risks, alternatives and benefits of the procedure the patient was deemed to be in satisfactory condition to undergo the procedure.           A Tuberculosis risk assessment was  performed:  The patient has no known RISK of Tuberculosis    The procedure was performed in a negative airflow room: The patient could not be moved to a negative airflow room because of needed OR for the procedure    Maneuvers / Procedure:      A Flexible and 12mm Rigid bronchoscope bronchoscope was used for the procedure. The rigid bronchoscope was inserted into the mouth. Uvula, epiglottis and vocal cords were seen. The scope was advanced turning the bevel to 90 degress while passing through the cords and into the trachea.     Airway Examination: A complete airway examination was performed from the distal trachea to the subsegmental level in each lobe of both lungs.  Pertinent findings include Y stent with significant granulation tissue at the distal ends of LMB and RMB, LMB more than RMB.         Stent removal: A total of 1 stent(s) were removed (Y stent trcheal to LMB and RMB) using the non-traumatic rescue forceps    Tumor/Tissue Debulking: The Left mainstem airway was accessed and tumor/tissue debulking was performed using the 2.4mm ERBE cryoprobe. Hemostasis and patency of the airway was acheived post-debulking.    Any disposable equipment was visually inspected and deemed to be intact immediately post procedure.      Relevant Pictures  none    Assessment and Recommendations:     Successful completion of Rigid and flexible bronchoscopy with Y hybrid stent removal, LMB granulation tissue debulking using cryoprobe  Ok to discharge once medically stable.             Ayde Ortiz  Interventional pulmonary fellow  Pager: [phone]

## 2024-04-19 NOTE — DISCHARGE INSTRUCTIONS
Post Bronchoscopy Patient Instructions:    April 19, 2024  Brendan CHILDS Scooter    Your procedure completed (bronchoscopy with airway stent removal, granulation tissue removal) without any immediate complications.  You may cough up scant amount of blood for the next 12-24 hours. If you have excessive cough with blood, chest pain, shortness of breath, please report to the closest emergency room.    You may experience low grade (less than 100.5 F) fever next 24 hours, if so, you can take Tylenol. If the fever persists more than 24 hours, please contact to our office or your primary care provider.    You may resume your diet as it was prior to procedure.    You may resume your medications after the procedure unless you are instructed to do differently.     Please follow instructions from the nursing staff upon discharge in terms of activity. In general, you should avoid any attention or motor skill requiring activities (e.g., driving or operating any motorized vehicle) for 24 hours as you might be still under the effect of sedation medications. Please make sure an adult to accompany you next 24 hours.     Should you have any question, please do not hesitate to call our office.    Ayde Ortiz MD    Contacting your Doctor -   To contact a doctor, call Dr George at [phone] at the Pulmonary Medicine Clinic at 8 am till 5 pm  or:  [phone] and ask for the resident on call for Pulmonology (answered 24 hours a day)   Emergency Department:  [facility]: [phone]  [facility]: [phone] 911 if you are in need of immediate or emergent help

## 2024-04-19 NOTE — ANESTHESIA POSTPROCEDURE EVALUATION
Patient: Brendan Helms    Procedure: Procedure(s):  BRONCHOSCOPY, RIGID,  flexible bronchoscopy , stent removal, cryoprobe tissue debulking       Anesthesia Type:  No value filed.    Note:  Disposition: Outpatient   Postop Pain Control: Uneventful            Sign Out: Well controlled pain   PONV: No   Neuro/Psych: Uneventful            Sign Out: Acceptable/Baseline neuro status   Airway/Respiratory: Uneventful            Sign Out: Acceptable/Baseline resp. status   CV/Hemodynamics: Uneventful            Sign Out: Acceptable CV status; No obvious hypovolemia; No obvious fluid overload   Other NRE: NONE   DID A NON-ROUTINE EVENT OCCUR? No    Event details/Postop Comments:  Coughing after stent removal, improved with lidocaine neb. Short course bipap. Hypoglycemia treated.            Last vitals:  Vitals Value Taken Time   /91 04/19/24 1345   Temp 35.9  C (96.7  F) 04/19/24 1230   Pulse 88 04/19/24 1400   Resp 11 04/19/24 1400   SpO2 95 % 04/19/24 1400   Vitals shown include unfiled device data.    Electronically Signed By: Anthony Roche MD  April 19, 2024  2:01 PM

## 2024-04-19 NOTE — OR NURSING
Patient requesting to keep stent once it is removed. Discussed with Dr George and this can be honored.     After removal, bronchial stent washed with saline and dried, then double bagged in biohazard-labeled plastic bags and patient label affixed. Release form and stent sent with patient to PACU. Let PACU RN know during handoff that stent is to go home with patient - patient needs to sign release form and copy is to be sent with patient and a copy retained in patient's chart.

## 2024-04-19 NOTE — OR NURSING
Dr Olivo aware of pt's pre op blood sugar.  He took 18 units of novolog at 0830 and Lantus 20 units at 0530.  Blood sugar now 144.

## 2024-04-19 NOTE — ANESTHESIA CARE TRANSFER NOTE
Patient: Brendan Helms    Procedure: Procedure(s):  BRONCHOSCOPY, RIGID,  flexible bronchoscopy , stent removal, cryoprobe tissue debulking       Diagnosis: Tracheobronchomalacia [J39.8]  Diagnosis Additional Information: No value filed.    Anesthesia Type:   No value filed.     Note:    Oropharynx: oropharynx clear of all foreign objects  Level of Consciousness: awake  Oxygen Supplementation: face mask    Independent Airway: airway patency satisfactory and stable  Dentition: dentition unchanged  Vital Signs Stable: post-procedure vital signs reviewed and stable  Report to RN Given: handoff report given  Patient transferred to: PACU  Comments: Pt continuous coughing with pt clearing copious secretions, oral yanker suctioning.  Pt sitting up RT obtaining lidocaine neb and CPAP at bedside.   Handoff Report: Identifed the Patient, Identified the Reponsible Provider, Reviewed the pertinent medical history, Discussed the surgical course, Reviewed Intra-OP anesthesia mangement and issues during anesthesia, Set expectations for post-procedure period and Allowed opportunity for questions and acknowledgement of understanding      Vitals:  Vitals Value Taken Time   /85 04/19/24 1230   Temp 35.9  C (96.7  F) 04/19/24 1230   Pulse 100 04/19/24 1245   Resp 20 04/19/24 1245   SpO2 90 % 04/19/24 1245   Vitals shown include unfiled device data.    Electronically Signed By: CHDA Melton CRNA  April 19, 2024  12:46 PM

## 2024-04-19 NOTE — ANESTHESIA PREPROCEDURE EVALUATION
"Anesthesia Pre-Procedure Evaluation    Patient: Brendan Helms   MRN: [id number] : 1970        Procedure : Procedure(s):  BRONCHOSCOPY, RIGID,  flexible bronchoscopy , stent removal          Past Medical History:   Diagnosis Date    Diabetes (H)       Past Surgical History:   Procedure Laterality Date    BRONCHOSCOPY (RIGID OR FLEXIBLE), DIAGNOSTIC N/A 2023    Procedure: BRONCHOSCOPY, WITH BRONCHOALVEOLAR LAVAGE;  Surgeon: Rod Desai MD;  Location: [facility]    BRONCHOSCOPY, DILATE BRONCHUS, STENT BRONCHUS, COMBINED N/A 2024    Procedure: BRONCHOSCOPY, RIGID, flexible bronchoscopy , stent placement;  Surgeon: Rod Desai MD;  Location: [facility]      Allergies   Allergen Reactions    Amoxicillin-Pot Clavulanate Hives      Social History     Tobacco Use    Smoking status: Never    Smokeless tobacco: Never   Substance Use Topics    Alcohol use: Not Currently     Comment: occasional      Wt Readings from Last 1 Encounters:   24 104.4 kg (230 lb 2.6 oz)        Anesthesia Evaluation            ROS/MED HX  ENT/Pulmonary:       Neurologic:       Cardiovascular:     (+) Dyslipidemia - -   -  - -                                      METS/Exercise Tolerance:     Hematologic:       Musculoskeletal:       GI/Hepatic:       Renal/Genitourinary:       Endo:     (+) type I DM,                     Psychiatric/Substance Use:       Infectious Disease:       Malignancy:       Other:            Physical Exam    Airway        Mallampati: II   TM distance: > 3 FB   Neck ROM: full   Mouth opening: > 3 cm    Respiratory Devices and Support         Dental     Comment: wnl        Cardiovascular          Rhythm and rate: regular and normal     Pulmonary           breath sounds clear to auscultation           OUTSIDE LABS:  CBC: No results found for: \"WBC\", \"HGB\", \"HCT\", \"PLT\"  BMP:   Lab Results   Component Value Date     (H) 2024     (H) 2024     COAGS: No results found for: \"PTT\", " "\"INR\", \"FIBR\"  POC: No results found for: \"BGM\", \"HCG\", \"HCGS\"  HEPATIC: No results found for: \"ALBUMIN\", \"PROTTOTAL\", \"ALT\", \"AST\", \"GGT\", \"ALKPHOS\", \"BILITOTAL\", \"BILIDIRECT\", \"JEANMARIE\"  OTHER: No results found for: \"PH\", \"LACT\", \"A1C\", \"ANA CRISTINA\", \"PHOS\", \"MAG\", \"LIPASE\", \"AMYLASE\", \"TSH\", \"T4\", \"T3\", \"CRP\", \"SED\"    Anesthesia Plan    ASA Status:  3    NPO Status:  NPO Appropriate    Anesthesia Type: General.     - Airway: Mask Only      Maintenance: TIVA.        Consents    Anesthesia Plan(s) and associated risks, benefits, and realistic alternatives discussed. Questions answered and patient/representative(s) expressed understanding.     - Discussed:     - Discussed with:  Patient, Spouse      - Extended Intubation/Ventilatory Support Discussed: No.      - Patient is DNR/DNI Status: No     Use of blood products discussed: No .     Postoperative Care    Pain management: IV analgesics.   PONV prophylaxis: Ondansetron (or other 5HT-3), Dexamethasone or Solumedrol     Comments:    Other Comments:   NPO. Meds reviewed. Pt took insulin today, recheck of BS shows improvement from 230s to 144. Will continue to monitor.    No problems with anesthesia in past. No recent URI. No interval changes since procedure 2 weeks ago.           Yu Olivo MD    I have reviewed the pertinent notes and labs in the chart from the past 30 days and (re)examined the patient.  Any updates or changes from those notes are reflected in this note.              # Obesity: Estimated body mass index is 31.22 kg/m  as calculated from the following:    Height as of this encounter: 1.829 m (6').    Weight as of this encounter: 104.4 kg (230 lb 2.6 oz).      "

## 2024-04-23 NOTE — TELEPHONE ENCOUNTER
Received vcm from pt wanting to know when surgery wiil be. Called pt back and left detailed vcm explaining writer is waiting to hear back from the surgeon to see what can be moved around to get him in sooner than later.     Kathe Pantoja on 4/23/2024 at 11:06 AM

## 2024-04-24 NOTE — TELEPHONE ENCOUNTER
FUTURE VISIT INFORMATION      SURGERY INFORMATION:  Date: TBD    RECORDS REQUESTED FROM:       Primary Care Provider: [facility]    Most recent EKG+ Tracin24- [facility]    Most recent PFT's: 23- [facility]

## 2024-04-26 NOTE — TELEPHONE ENCOUNTER
Called and spoke with pt to let him know the surgeon stated PCP is not an option for pre op, but virtual visit with PAC is. Pt understood and agreed.    Kathe Pantoja on 4/26/2024 at 11:05 AM

## 2024-04-29 NOTE — TELEPHONE ENCOUNTER
FUTURE VISIT INFORMATION      SURGERY INFORMATION:  Date: 24  [facility]  Surgeon:  Lisa Gandara MD Andrade, Rafael Santiago, MD   Anesthesia Type:  general  Procedure: Right thoracoscopic tracheobronchoplasty, possible thoracotomy, intercostal nerve cryoablation     RECORDS REQUESTED FROM:         Primary Care Provider: [facility]     Most recent EKG+ Tracin24- [facility]     Most recent PFT's: 23- [facility]

## 2024-04-30 ENCOUNTER — PRE VISIT (OUTPATIENT)
Dept: SURGERY | Facility: CLINIC | Age: 54
End: 2024-04-30

## 2024-05-02 ENCOUNTER — PATIENT OUTREACH (OUTPATIENT)
Dept: SURGERY | Facility: CLINIC | Age: 54
End: 2024-05-02
Payer: COMMERCIAL

## 2024-05-03 ENCOUNTER — PRE VISIT (OUTPATIENT)
Dept: SURGERY | Facility: CLINIC | Age: 54
End: 2024-05-03

## 2024-05-10 ENCOUNTER — ANESTHESIA EVENT (OUTPATIENT)
Dept: SURGERY | Facility: CLINIC | Age: 54
DRG: 164 | End: 2024-05-10
Payer: COMMERCIAL

## 2024-05-10 ENCOUNTER — PRE VISIT (OUTPATIENT)
Dept: SURGERY | Facility: CLINIC | Age: 54
End: 2024-05-10

## 2024-05-10 ENCOUNTER — VIRTUAL VISIT (OUTPATIENT)
Dept: SURGERY | Facility: CLINIC | Age: 54
End: 2024-05-10
Payer: COMMERCIAL

## 2024-05-10 VITALS — WEIGHT: 230 LBS | HEIGHT: 72 IN | BODY MASS INDEX: 31.15 KG/M2

## 2024-05-10 DIAGNOSIS — Z01.818 PREOP EXAMINATION: Primary | ICD-10-CM

## 2024-05-10 DIAGNOSIS — J39.8 TRACHEOBRONCHOMALACIA: ICD-10-CM

## 2024-05-10 PROCEDURE — 99204 OFFICE O/P NEW MOD 45 MIN: CPT | Mod: 95 | Performed by: CLINICAL NURSE SPECIALIST

## 2024-05-10 RX ORDER — TAMSULOSIN HYDROCHLORIDE 0.4 MG/1
0.4 CAPSULE ORAL DAILY
COMMUNITY

## 2024-05-10 ASSESSMENT — ENCOUNTER SYMPTOMS
SEIZURES: 0
DYSRHYTHMIAS: 0

## 2024-05-10 ASSESSMENT — PAIN SCALES - GENERAL: PAINLEVEL: NO PAIN (0)

## 2024-05-10 ASSESSMENT — LIFESTYLE VARIABLES: TOBACCO_USE: 0

## 2024-05-10 NOTE — PATIENT INSTRUCTIONS
Preparing for Your Surgery      Name:  Brendan Helms   MRN:  [id number]   :  1970   Today's Date:  5/10/2024       Arriving for surgery:  Surgery date:  24  Arrival time:  6:55 am  Surgery time: 8:55 am    Please come to:     Please come to:       [facility]    [address] [place]  [address]     The [facility] Patient/Visitor Ramp is at [address]. Patients and visitors who self-park will receive the reduced hospital parking rate. If the Patient /Visitor Ramp is full, please follow the signs to the [employer] car park located at the main hospital entrance.       parking is available (24 hours/ 7 days a week)      Discounted parking pass options are available for patients and visitors. They can be purchased at the [facility] desk at the main hospital entrance.     -    Stop at the security desk and they will direct surgery patients to the [place] [facility] [place] [phone]        - If you need directions, a wheelchair or an escort please stop at the Information/security desk in the lobby.     What can I eat or drink?  -  You may eat and drink normally up to 8 hours prior to arrival time. (Until 10:55 pm on 24)  -  You may have clear liquids until 2 hours prior to arrival time. (Until 4:55 am on 24)    Examples of clear liquids:  Water  Clear broth  Juices (apple, white grape, white cranberry  and cider) without pulp  Noncarbonated, powder based beverages  (lemonade and Noe-Aid)  Sodas (Sprite, 7-Up, ginger ale and seltzer)  Coffee or tea (without milk or cream)  Gatorade    -  No Alcohol or cannabis products for at least 24 hours before surgery.     Which medicines can I take?    Hold Aspirin for 7 days before surgery.   Hold Multivitamins for 7 days before surgery.  Hold Supplements for 7 days before surgery.  Hold Ibuprofen (Advil, Motrin)  for 1 day(s) before surgery--unless otherwise directed by surgeon.  Hold Naproxen (Aleve) for 4 days before surgery.    -  DO NOT take these medications the day of surgery:   Sildenafil (Ravatio) - stop 24 hours before surgery  Insulin Aspart (Novolog)    -  PLEASE TAKE these medications per your usual routine:  Albuterol (Proair) inhaler if needed and bring this with you day of surgery  Atorvastin (Lipitor)  Tamsulosin (Flomax)  Ramipril (Altace) may be taken the evening before surgery, but must be at least 12 hours before surgery start time. Take no later than 8:55 pm the night before surgery.  Take 80% of your usual dose of Insulin Glargine (Lantus) the morning of surgery. Take 28 units the morning of surgery rather than your usual 35 units.    How do I prepare myself?  - Please take 2 showers (one the night prior to surgery and one the morning of surgery) using Scrubcare or Hibiclens soap.    Use this soap only from the neck to your toes.     Leave the soap on your skin for one minute--then rinse thoroughly.      You may use your own shampoo and conditioner. No other hair products.   - Please remove all jewelry and body piercings.  - No lotions, deodorants or fragrance.  - No makeup or fingernail polish.   - Bring your ID and insurance card.    -If you use a CPAP machine, please bring the CPAP machine, tubing, and mask to hospital.    -If you have a Deep Brain Stimulator, Spinal Cord Stimulator, or any Neuro Stimulator device---you must bring the remote control to the hospital.      ALL PATIENTS GOING HOME THE SAME DAY OF SURGERY ARE REQUIRED TO HAVE A RESPONSIBLE ADULT TO DRIVE AND BE IN ATTENDANCE WITH THEM FOR 24 HOURS FOLLOWING SURGERY.    Covid testing policy as of 12/06/2022  Your surgeon will notify and schedule you for a COVID test if one is needed before surgery--please direct any questions or COVID symptoms to your surgeon      Questions or Concerns:    - For any questions regarding the day of surgery  or your hospital stay, please contact the Pre Admission Nursing Office at [phone].       - If you have health changes between today and your surgery, please call your surgeon.       - For questions after surgery, please call your surgeons office.           Current Visitor Guidelines    You may have 2 visitors in the pre op area.    Visiting hours: 8 a.m. to 8:30 p.m.    Patients confirmed or suspected to have symptoms of COVID 19 or flu:     No visitors allowed for adult patients.   Children (under age 18) can have 1 named visitor.     People who are sick or showing symptoms of COVID 19 or flu:    Are not allowed to visit patients--we can only make exceptions in special situations.       Please follow these guidelines for your visit:          Please maintain social distance          Masking is optional--however at times you may be asked to wear a mask for the safety of yourself and others     Clean your hands with alcohol hand . Do this when you arrive at and leave the building and patient room,    And again after you touch your mask or anything in the room.     Go directly to and from the room you are visiting.     Stay in the patient s room during your visit. Limit going to other places in the hospital as much as possible     Leave bags and jackets at home or in the car.     For everyone s health, please don t come and go during your visit. That includes for smoking   during your visit.

## 2024-05-10 NOTE — PROGRESS NOTES
Rigoberto is a 53 year old who is being evaluated via a billable video visit.    How would you like to obtain your AVS? Mail a copy & E-MAIL  [email or web address]   If the video visit is dropped, the invitation should be resent by: Text to cell phone: [phone]    Subjective   Rigoberto is a 53 year old, presenting for the following health issues:  Pre-Op Exam    HPI         Physical Exam      Opioid Counseling: I discussed with the patient the potential side effects of opioids including but not limited to addiction, altered mental status, and depression. I stressed avoiding alcohol, benzodiazepines, muscle relaxants and sleep aids unless specifically okayed by a physician. The patient verbalized understanding of the proper use and possible adverse effects of opioids. All of the patient's questions and concerns were addressed. They were instructed to flush the remaining pills down the toilet if they did not need them for pain.

## 2024-05-10 NOTE — H&P
Pre-Operative H & P     CC:  Preoperative exam to assess for increased cardiopulmonary risk while undergoing surgery and anesthesia.    Date of Encounter: 5/10/2024  Primary Care Physician:  Franchesca Juarez     Reason for visit:   Encounter Diagnoses   Name Primary?    Preop examination Yes    Tracheobronchomalacia        HPI  Brendan Helms is a 53 year old male who presents for pre-operative H & P in preparation for  Procedure Information       Case: [id number] Date/Time: 05/17/24 0855    Procedures:       Right thoracoscopic tracheobronchoplasty, (Neck)      possible thoracotomy, intercostal nerve cryoablation (Chest)    Anesthesia type: General    Diagnosis: Tracheobronchomalacia [J39.8]    Pre-op diagnosis: Tracheobronchomalacia [J39.8]    Location:  OR  /  OR    Providers: Lisa Gandara MD          History is obtained from the patient and chart review    Patient who has been followed by Dr. 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 He presented with exertional dyspnea which has been impacting his work and daily life. He has coughing spells in the morning. He has undergone an extensive work up and most recently had a stent trial on 4/5/24.     He returned in follow up on 4/10/24, and reported significant improvement in his breathing, although did report some coughing and difficulty clearing secretions.     He has now been counseled for above procedures.     His history is otherwise significant for hyperlipidemia, and diabetes type 1    Hx of abnormal bleeding or anti-platelet use: Denies      Past Medical History  Past Medical History:   Diagnosis Date    Diabetes, type I     HLD (hyperlipidemia)     Tracheobronchomalacia        Past Surgical History  Past Surgical History:   Procedure Laterality Date    BRONCHOSCOPY (RIGID OR FLEXIBLE), DIAGNOSTIC N/A 7/13/2023    Procedure: BRONCHOSCOPY, WITH  BRONCHOALVEOLAR LAVAGE;  Surgeon: Rod Desai MD;  Location: [facility]    BRONCHOSCOPY, DILATE BRONCHUS, STENT BRONCHUS, COMBINED N/A 4/5/2024    Procedure: BRONCHOSCOPY, RIGID, flexible bronchoscopy , stent placement;  Surgeon: Rod Desai MD;  Location: [facility]    BRONCHOSCOPY, DILATE BRONCHUS, STENT BRONCHUS, COMBINED N/A 4/19/2024    Procedure: BRONCHOSCOPY, RIGID,  flexible bronchoscopy , stent removal, cryoprobe tissue debulking;  Surgeon: Ruthy George MD;  Location: [facility]       Prior to Admission Medications  Current Outpatient Medications   Medication Sig Dispense Refill    albuterol (PROAIR HFA/PROVENTIL HFA/VENTOLIN HFA) 108 (90 Base) MCG/ACT inhaler Inhale 2 puffs into the lungs      atorvastatin (LIPITOR) 40 MG tablet Take 1 tablet by mouth At Bedtime      ramipril (ALTACE) 5 MG capsule Take 1 capsule by mouth at bedtime      sildenafil (REVATIO) 20 MG tablet Take 40-60 mg by mouth daily as needed      tamsulosin (FLOMAX) 0.4 MG capsule Take 0.4 mg by mouth daily      blood glucose (CONTOUR TEST) test strip Test 6 times daily and as needed.      Glucagon (BAQSIMI ONE PACK) 3 MG/DOSE POWD       insulin aspart (NOVOLOG PEN) 100 UNIT/ML pen Inject Subcutaneous 4 times daily (with meals and nightly) Sliding scale      insulin glargine (LANTUS PEN) 100 UNIT/ML pen Inject 35-40 Units Subcutaneous 2 times daily 35 units in AM and 40 in PM         Allergies  Allergies   Allergen Reactions    Amoxicillin-Pot Clavulanate Hives       Social History  Social History     Socioeconomic History    Marital status:      Spouse name: Not on file    Number of children: Not on file    Years of education: Not on file    Highest education level: Not on file   Occupational History    Not on file   Tobacco Use    Smoking status: Never    Smokeless tobacco: Never   Vaping Use    Vaping status: Never Used   Substance and Sexual Activity    Alcohol use: Not Currently     Comment: occasional    Drug use: Never     Sexual activity: Not on file   Other Topics Concern    Not on file   Social History Narrative    Not on file     Social Determinants of Health     Financial Resource Strain: Not on file   Food Insecurity: Patient Declined (4/1/2024)    [email or web address] [employer] [facility]    Hunger Vital Sign     Worried About Running Out of Food in the Last Year: Patient declined     Ran Out of Food in the Last Year: Patient declined   Transportation Needs: Patient Declined (4/1/2024)    [email or web address] [employer] [facility] [employer] [facility] [employer] [facility] [employer] [facility]    PRAPARE - Transportation     Lack of Transportation (Medical): Patient declined     Lack of Transportation (Non-Medical): Patient declined   Physical Activity: Not on file   Stress: Not on file   Social Connections: Not on file   Interpersonal Safety: Not on file   Housing Stability: Patient Declined (4/1/2024)    [email or web address] [employer] [facility] [employer]     Retired - What is your living situation today? : Patient refused       Family History  Family History   Problem Relation Age of Onset    Malignant Hyperthermia No family hx of        Review of Systems  The complete review of systems is negative other than noted in the HPI or here.   Anesthesia Evaluation   Pt has had prior anesthetic. Type: General.    No history of anesthetic complications       ROS/MED HX  ENT/Pulmonary: Comment: Tracheobronchomalacia and EDAC, s/p recent stent trial  History of two neg sleep studies    DYSPNEA ON EXERTION, need albuterol multiple times daily   (-) tobacco use and recent URI   Neurologic:    (-) no seizures and no CVA   Cardiovascular: Comment: Ramipril for kidney protection    (+) Dyslipidemia - -   -  - -           COLBY.                      Previous cardiac testing   Echo: Date: 4/28/23 Results:    Stress Test:  Date: 3/3/23  Results:    ECG Reviewed:  Date: 24 Results:  Normal sinus rhythm   Incomplete right bundle branch block     Cath:  Date: Results:   (-) hypertension, taking anticoagulants/antiplatelets and arrhythmias   METS/Exercise Tolerance: 1 - Eating, dressing Comment: Works regularly, and tries to do chores like chop wood but becomes very short of breath   Hematologic:    (-) history of blood clots and history of blood transfusion   Musculoskeletal:  - neg musculoskeletal ROS     GI/Hepatic:    (-) GERD   Renal/Genitourinary:  - neg Renal ROS     Endo:     (+) type I DM, type II DM, Last HgA1c: 6.5, date: 24, Using insulin, - not using insulin pump. Normal glucose range: self monitors with fingersticks ~7 times daily,               Psychiatric/Substance Use:  - neg psychiatric ROS  (-) psychiatric history   Infectious Disease:  - neg infectious disease ROS     Malignancy:  - neg malignancy ROS     Other:  - neg other ROS          Virtual visit -  No vitals were obtained    Physical Exam  Constitutional: Awake, alert, no apparent distress, and appears stated age.  HENT: Normocephalic  Respiratory: non labored breathing   Neurologic: Awake, alert, oriented to name, place and time.   Neuropsychiatric: Calm, cooperative. Normal affect.      Prior Labs/Diagnostic Studies   All labs and imaging personally reviewed   Last Comprehensive Metabolic Panel:  Lab Results   Component Value Date     2024    POTASSIUM 4.0 2024    CHLORIDE 100 2024    CO2 24 2024    ANIONGAP 11 2024    GLC 91 2024    BUN 13.8 2024    CR 0.95 2024    GFRESTIMATED >90 2024    ANA CRISTINA 9.4 2024     OSH 24   WBC 5.3  Hgb 15.6  Hematocrit 44.9  Platelets 228    EK24 Normal sinus rhythm   Incomplete right bundle branch block     Echocardiogram 23    Interpretation Summary   Technically difficult echocardiogram.   The left ventricular systolic function is normal.   Left ventricular  diastolic function is normal.   No significant valvular stenosis or insufficiency seen.   TR signal inadequate to allow accurate estimate RV systolic pressure.   There is no pericardial effusion.   Compared to echocardiogram dated 3/3/23, findings are similar.     Stress echocardiogram 3/3/23    Interpretation Summary   Diagnostic dobutamine stress echocardiogram was obtained for dyspnea on exertion. Stress echo is negative for inducible ischemia.     Hypertensive hemodynamic response to stress.   Dyspnea was noted at peak stress and improved in recovery. No chest pain was noted with stress.   Baseline EKG with normal sinus rhythm and with stress no ST-T changes.   Baseline echo with normal left ventricular systolic function and normal hyperdynamic function with stress.   No evidence of inducible wall motion abnormalities.     The patient's records and results personally reviewed by this provider.     Outside records reviewed from: Care Everywhere      Assessment    Brendan Helms is a 53 year old male seen as a PAC referral for risk assessment and optimization for anesthesia.    Plan/Recommendations  Pt will be optimized for the proposed procedure.  See below for details on the assessment, risk, and preoperative recommendations    NEUROLOGY  - No history of TIA, CVA or seizure    -Post Op delirium risk factors:  No risk identified    ENT  - No current airway concerns.  Will need to be reassessed day of surgery.  Mallampati: Unable to assess  TM: Unable to assess  Anesthesia records available in our system. From last OSH airway note 2/17/23, difficult mask    CARDIAC  HLD. Atorvastatin at HS. Ramipril also taken at night for kidney protection. Denies chest pain or irregular HR. Activity is currently very limited due to shortness of breath. Initial cardiac work up above.  - METS (Metabolic Equivalents)<4    RCRI: 6.6% risk of serious cardiac events    PULMONARY  Tracheobronchomalacia and excessive dynamic airway  collapse of the posterior membrane (EDAC), s/p multiple evaluations and recent stent trial.   Some coughing and shortness of breath on exertion. Uses albuterol inhaler multiple times during day and does provide some relief.  History of 2 negative sleep studies  - Tobacco History    History   Smoking Status    Never   Smokeless Tobacco    Never       GI: Denies GERD  PONV Low Risk  Total Score: 1           1 AN PONV: Patient is not a current smoker        /RENAL  - Baseline Creatinine  0.95    ENDOCRINE    - BMI: Estimated body mass index is 31.19 kg/m  as calculated from the following:    Height as of this encounter: 1.829 m (6').    Weight as of this encounter: 104.3 kg (230 lb).  Obesity (BMI >30)  - Diabetes, type 1. Patient has had DIABETES MELLITUS for 40 years. Last A1C 6.5. Good control overall. Patient is doing frequent fingersticks. Does have some concerns about NPO instructions, and monitoring of blood glucose during long drive to surgery. Discussed with preadmission nurse today. Will take 80% of Lantus dose on DOS. Will hold Novolog.    HEME  VTE Low Risk 0.26%             Total Score: 0      Denies personal history of blood clots  Denies history of blood transfusion. Has never had a type and screen. Was willing to get a type and screen for screening purposes at local [facility]. Order provided. Coordinator will assist with arrangements.     An updated type and screen will need to be drawn on DOS     MSK: Frailty score 0/5    Different anesthesia methods/types have been discussed with the patient, but they are aware that the final plan will be decided by the assigned anesthesia provider on the date of service.    The patient is optimized for their procedure. AVS with information on surgery time/arrival time, meds and NPO status given by nursing staff. No further diagnostic testing indicated.    Please refer to the physical examination documented by the anesthesiologist in the anesthesia record on  the day of surgery.    ADDENDUM: 5/16/24   Type and screen drawn on 5/10/24 at local [facility] due to distance. Antibody screen neg. To be updated on DOS upon arrival     Video-Visit Details    Type of service:  Video Visit    Provider received verbal consent for a Video Visit from the patient? Yes     Originating Location (pt. Location): Home    Distant Location (provider location):  Off-site  Mode of Communication:  Video Conference via [employer]  On the day of service:     Prep time: 14 minutes  Visit time: 18 minutes  Documentation time: 15 minutes  ------------------------------------------  Total time: 47 minutes      CHAD De La Cruz CNS  Preoperative Assessment Center  [facility]  Cl[facility] and [facility] Center  Phone: [phone]  Fax: [phone]

## 2024-05-13 NOTE — ADDENDUM NOTE
Addended by: TASHA WALTERS on: 5/13/2024 10:31 AM     Modules accepted: Orders     Side effects of pain management treatment/Education provided on the pain management plan of care

## 2024-05-14 NOTE — TELEPHONE ENCOUNTER
Post-op and chest XR rescheduled to be on 6/12/24 per RN. Per RN, no need to call as they will update the PT. Post-op rescheduled to be with Dr. Dumont per request.    Any Guardado on 5/14/2024 at 10:08 AM

## 2024-05-16 ASSESSMENT — ENCOUNTER SYMPTOMS
DYSRHYTHMIAS: 0
SEIZURES: 0

## 2024-05-16 ASSESSMENT — LIFESTYLE VARIABLES: TOBACCO_USE: 0

## 2024-05-16 NOTE — ANESTHESIA PREPROCEDURE EVALUATION
Anesthesia Pre-Procedure Evaluation    Patient: Brendan Helms   MRN: [id number] : 1970        Procedure : Procedure(s):  Right thoracoscopic tracheobronchoplasty,  possible thoracotomy, intercostal nerve cryoablation          Past Medical History:   Diagnosis Date    Diabetes, type I     HLD (hyperlipidemia)     Tracheobronchomalacia       Past Surgical History:   Procedure Laterality Date    BRONCHOSCOPY (RIGID OR FLEXIBLE), DIAGNOSTIC N/A 2023    Procedure: BRONCHOSCOPY, WITH BRONCHOALVEOLAR LAVAGE;  Surgeon: Rod Desai MD;  Location: [facility]    BRONCHOSCOPY, DILATE BRONCHUS, STENT BRONCHUS, COMBINED N/A 2024    Procedure: BRONCHOSCOPY, RIGID, flexible bronchoscopy , stent placement;  Surgeon: Rod Desai MD;  Location: [facility]    BRONCHOSCOPY, DILATE BRONCHUS, STENT BRONCHUS, COMBINED N/A 2024    Procedure: BRONCHOSCOPY, RIGID,  flexible bronchoscopy , stent removal, cryoprobe tissue debulking;  Surgeon: Ruthy George MD;  Location: [facility]      Allergies   Allergen Reactions    Amoxicillin-Pot Clavulanate Hives      Social History     Tobacco Use    Smoking status: Never    Smokeless tobacco: Never   Substance Use Topics    Alcohol use: Not Currently     Comment: occasional      Wt Readings from Last 1 Encounters:   05/10/24 104.3 kg (230 lb)        Anesthesia Evaluation   Pt has had prior anesthetic. Type: General.    No history of anesthetic complications       ROS/MED HX  ENT/Pulmonary: Comment: Tracheobronchomalacia and EDAC, s/p recent stent trial  History of two neg sleep studies    DYSPNEA ON EXERTION, need albuterol multiple times daily   (-) tobacco use and recent URI   Neurologic:    (-) no seizures and no CVA   Cardiovascular: Comment: Ramipril for kidney protection    (+) Dyslipidemia - -   -  - -           COLBY.                      Previous cardiac testing   Echo: Date: 23 Results:    Stress Test:  Date: 3/3/23 Results:    ECG Reviewed:  Date: 24  "Results:  Normal sinus rhythm   Incomplete right bundle branch block     Cath:  Date: Results:   (-) hypertension, taking anticoagulants/antiplatelets and arrhythmias   METS/Exercise Tolerance: 1 - Eating, dressing Comment: Works regularly, and tries to do chores like chop wood but becomes very short of breath   Hematologic:    (-) history of blood clots and history of blood transfusion   Musculoskeletal:  - neg musculoskeletal ROS     GI/Hepatic:    (-) GERD   Renal/Genitourinary:  - neg Renal ROS     Endo:     (+) type I DM, type II DM, Last HgA1c: 6.5, date: 1/5/24, Using insulin, - not using insulin pump. Normal glucose range: self monitors with fingersticks ~7 times daily,               Psychiatric/Substance Use:  - neg psychiatric ROS  (-) psychiatric history   Infectious Disease:  - neg infectious disease ROS     Malignancy:  - neg malignancy ROS     Other:  - neg other ROS          Physical Exam    Airway        Mallampati: II   TM distance: > 3 FB   Neck ROM: full   Mouth opening: > 3 cm    Respiratory Devices and Support         Dental       (+) Minor Abnormalities - some fillings, tiny chips      Cardiovascular          Rhythm and rate: regular     Pulmonary   pulmonary exam normal                OUTSIDE LABS:  CBC: No results found for: \"WBC\", \"HGB\", \"HCT\", \"PLT\"  BMP:   Lab Results   Component Value Date     04/19/2024    POTASSIUM 4.0 04/19/2024    CHLORIDE 100 04/19/2024    CO2 24 04/19/2024    BUN 13.8 04/19/2024    CR 0.95 04/19/2024    GLC 91 04/19/2024    GLC 50 (LL) 04/19/2024     COAGS: No results found for: \"PTT\", \"INR\", \"FIBR\"  POC: No results found for: \"BGM\", \"HCG\", \"HCGS\"  HEPATIC: No results found for: \"ALBUMIN\", \"PROTTOTAL\", \"ALT\", \"AST\", \"GGT\", \"ALKPHOS\", \"BILITOTAL\", \"BILIDIRECT\", \"JEANMARIE\"  OTHER:   Lab Results   Component Value Date    ANA CRISTINA 9.4 04/19/2024       Anesthesia Plan    ASA Status:  2    NPO Status:  NPO Appropriate    Anesthesia Type: General.     - Airway: ETT "   Induction: Intravenous.   Maintenance: Balanced.   Techniques and Equipment:     - Airway: Double lumen ETT, Fiberoptic Bronchoscope     - Lines/Monitors: 2nd IV, Arterial Line, BIS     - Blood: T&S     Consents    Anesthesia Plan(s) and associated risks, benefits, and realistic alternatives discussed. Questions answered and patient/representative(s) expressed understanding.     - Discussed: Risks, Benefits and Alternatives for BOTH SEDATION and the PROCEDURE were discussed     - Discussed with:  Patient            Postoperative Care    Pain management: Oral pain medications, IV analgesics, Multi-modal analgesia (Intercostal nerve cryoablation).   PONV prophylaxis: Ondansetron (or other 5HT-3), Dexamethasone or Solumedrol     Comments:               Collins Araujo MD    I have reviewed the pertinent notes and labs in the chart from the past 30 days and (re)examined the patient.  Any updates or changes from those notes are reflected in this note.     # Hyponatremia: Lowest Na = 135 mmol/L in last 30 days, will monitor as appropriate          # Obesity: Estimated body mass index is 31.19 kg/m  as calculated from the following:    Height as of 5/10/24: 1.829 m (6').    Weight as of 5/10/24: 104.3 kg (230 lb).

## 2024-05-17 ENCOUNTER — ANESTHESIA (OUTPATIENT)
Dept: SURGERY | Facility: CLINIC | Age: 54
DRG: 164 | End: 2024-05-17
Payer: COMMERCIAL

## 2024-05-17 ENCOUNTER — HOSPITAL ENCOUNTER (INPATIENT)
Facility: CLINIC | Age: 54
LOS: 6 days | Discharge: HOME | DRG: 164 | End: 2024-05-23
Attending: THORACIC SURGERY (CARDIOTHORACIC VASCULAR SURGERY) | Admitting: INTERNAL MEDICINE
Payer: COMMERCIAL

## 2024-05-17 ENCOUNTER — APPOINTMENT (OUTPATIENT)
Dept: GENERAL RADIOLOGY | Facility: CLINIC | Age: 54
DRG: 164 | End: 2024-05-17
Payer: COMMERCIAL

## 2024-05-17 DIAGNOSIS — J39.8 TRACHEOBRONCHOMALACIA: Primary | ICD-10-CM

## 2024-05-17 LAB
ABO/RH(D): NORMAL
ANTIBODY SCREEN: NEGATIVE
BASE EXCESS BLDA CALC-SCNC: -0.3 MMOL/L (ref -3–3)
BASE EXCESS BLDA CALC-SCNC: -0.6 MMOL/L (ref -3–3)
BASE EXCESS BLDA CALC-SCNC: -0.6 MMOL/L (ref -3–3)
BASE EXCESS BLDA CALC-SCNC: 1.3 MMOL/L (ref -3–3)
CA-I BLD-MCNC: 4.5 MG/DL (ref 4.4–5.2)
CA-I BLD-MCNC: 4.6 MG/DL (ref 4.4–5.2)
GLUCOSE BLD-MCNC: 111 MG/DL (ref 70–99)
GLUCOSE BLD-MCNC: 158 MG/DL (ref 70–99)
GLUCOSE BLD-MCNC: 196 MG/DL (ref 70–99)
GLUCOSE BLD-MCNC: 73 MG/DL (ref 70–99)
GLUCOSE BLDC GLUCOMTR-MCNC: 100 MG/DL (ref 70–99)
GLUCOSE BLDC GLUCOMTR-MCNC: 107 MG/DL (ref 70–99)
GLUCOSE BLDC GLUCOMTR-MCNC: 172 MG/DL (ref 70–99)
GLUCOSE BLDC GLUCOMTR-MCNC: 333 MG/DL (ref 70–99)
GLUCOSE BLDC GLUCOMTR-MCNC: 362 MG/DL (ref 70–99)
HCO3 BLDA-SCNC: 27 MMOL/L (ref 21–28)
HCO3 BLDA-SCNC: 28 MMOL/L (ref 21–28)
HCO3 BLDA-SCNC: 28 MMOL/L (ref 21–28)
HCO3 BLDA-SCNC: 29 MMOL/L (ref 21–28)
HGB BLD-MCNC: 14.1 G/DL (ref 13.3–17.7)
HGB BLD-MCNC: 14.4 G/DL (ref 13.3–17.7)
HGB BLD-MCNC: 14.5 G/DL (ref 13.3–17.7)
HGB BLD-MCNC: 14.9 G/DL (ref 13.3–17.7)
LACTATE BLD-SCNC: 0.4 MMOL/L (ref 0.7–2)
LACTATE BLD-SCNC: 0.5 MMOL/L (ref 0.7–2)
LACTATE BLD-SCNC: 0.5 MMOL/L (ref 0.7–2)
LACTATE BLD-SCNC: 0.6 MMOL/L (ref 0.7–2)
O2/TOTAL GAS SETTING VFR VENT: 100 %
O2/TOTAL GAS SETTING VFR VENT: 100 %
O2/TOTAL GAS SETTING VFR VENT: 96 %
O2/TOTAL GAS SETTING VFR VENT: 96 %
PCO2 BLDA: 55 MM HG (ref 35–45)
PCO2 BLDA: 56 MM HG (ref 35–45)
PCO2 BLDA: 59 MM HG (ref 35–45)
PCO2 BLDA: 62 MM HG (ref 35–45)
PH BLDA: 7.26 [PH] (ref 7.35–7.45)
PH BLDA: 7.28 [PH] (ref 7.35–7.45)
PH BLDA: 7.3 [PH] (ref 7.35–7.45)
PH BLDA: 7.32 [PH] (ref 7.35–7.45)
PO2 BLDA: 125 MM HG (ref 80–105)
PO2 BLDA: 85 MM HG (ref 80–105)
PO2 BLDA: 89 MM HG (ref 80–105)
PO2 BLDA: 92 MM HG (ref 80–105)
POTASSIUM BLD-SCNC: 4.1 MMOL/L (ref 3.4–5.3)
POTASSIUM BLD-SCNC: 4.2 MMOL/L (ref 3.4–5.3)
POTASSIUM BLD-SCNC: 4.8 MMOL/L (ref 3.4–5.3)
POTASSIUM BLD-SCNC: 5.2 MMOL/L (ref 3.4–5.3)
SAO2 % BLDA: 96 % (ref 96–97)
SAO2 % BLDA: 97 % (ref 96–97)
SAO2 % BLDA: 97 % (ref 96–97)
SAO2 % BLDA: 99 % (ref 96–97)
SODIUM BLD-SCNC: 138 MMOL/L (ref 135–145)
SODIUM BLD-SCNC: 139 MMOL/L (ref 135–145)
SODIUM BLD-SCNC: 140 MMOL/L (ref 135–145)
SODIUM BLD-SCNC: 140 MMOL/L (ref 135–145)
SPECIMEN EXPIRATION DATE: NORMAL

## 2024-05-17 PROCEDURE — 82565 ASSAY OF CREATININE: CPT

## 2024-05-17 PROCEDURE — 360N000077 HC SURGERY LEVEL 4, PER MIN: Performed by: THORACIC SURGERY (CARDIOTHORACIC VASCULAR SURGERY)

## 2024-05-17 PROCEDURE — 250N000013 HC RX MED GY IP 250 OP 250 PS 637

## 2024-05-17 PROCEDURE — 0BJ08ZZ INSPECTION OF TRACHEOBRONCHIAL TREE, VIA NATURAL OR ARTIFICIAL OPENING ENDOSCOPIC: ICD-10-PCS | Performed by: THORACIC SURGERY (CARDIOTHORACIC VASCULAR SURGERY)

## 2024-05-17 PROCEDURE — C2618 PROBE/NEEDLE, CRYO: HCPCS | Performed by: THORACIC SURGERY (CARDIOTHORACIC VASCULAR SURGERY)

## 2024-05-17 PROCEDURE — 999N000065 XR CHEST PORT 1 VIEW

## 2024-05-17 PROCEDURE — 250N000011 HC RX IP 250 OP 636: Mod: JZ | Performed by: THORACIC SURGERY (CARDIOTHORACIC VASCULAR SURGERY)

## 2024-05-17 PROCEDURE — 258N000003 HC RX IP 258 OP 636

## 2024-05-17 PROCEDURE — 250N000011 HC RX IP 250 OP 636

## 2024-05-17 PROCEDURE — 250N000009 HC RX 250

## 2024-05-17 PROCEDURE — 36415 COLL VENOUS BLD VENIPUNCTURE: CPT | Performed by: THORACIC SURGERY (CARDIOTHORACIC VASCULAR SURGERY)

## 2024-05-17 PROCEDURE — 250N000025 HC SEVOFLURANE, PER MIN: Performed by: THORACIC SURGERY (CARDIOTHORACIC VASCULAR SURGERY)

## 2024-05-17 PROCEDURE — 0BV30CZ RESTRICTION OF RIGHT MAIN BRONCHUS WITH EXTRALUMINAL DEVICE, OPEN APPROACH: ICD-10-PCS | Performed by: THORACIC SURGERY (CARDIOTHORACIC VASCULAR SURGERY)

## 2024-05-17 PROCEDURE — 370N000017 HC ANESTHESIA TECHNICAL FEE, PER MIN: Performed by: THORACIC SURGERY (CARDIOTHORACIC VASCULAR SURGERY)

## 2024-05-17 PROCEDURE — 82330 ASSAY OF CALCIUM: CPT

## 2024-05-17 PROCEDURE — 88305 TISSUE EXAM BY PATHOLOGIST: CPT | Mod: 26 | Performed by: PATHOLOGY

## 2024-05-17 PROCEDURE — 250N000009 HC RX 250: Performed by: THORACIC SURGERY (CARDIOTHORACIC VASCULAR SURGERY)

## 2024-05-17 PROCEDURE — 710N000010 HC RECOVERY PHASE 1, LEVEL 2, PER MIN: Performed by: THORACIC SURGERY (CARDIOTHORACIC VASCULAR SURGERY)

## 2024-05-17 PROCEDURE — 84295 ASSAY OF SERUM SODIUM: CPT

## 2024-05-17 PROCEDURE — 71045 X-RAY EXAM CHEST 1 VIEW: CPT | Mod: 26 | Performed by: STUDENT IN AN ORGANIZED HEALTH CARE EDUCATION/TRAINING PROGRAM

## 2024-05-17 PROCEDURE — C1781 MESH (IMPLANTABLE): HCPCS | Performed by: THORACIC SURGERY (CARDIOTHORACIC VASCULAR SURGERY)

## 2024-05-17 PROCEDURE — 86900 BLOOD TYPING SEROLOGIC ABO: CPT | Performed by: THORACIC SURGERY (CARDIOTHORACIC VASCULAR SURGERY)

## 2024-05-17 PROCEDURE — 250N000011 HC RX IP 250 OP 636: Performed by: REGISTERED NURSE

## 2024-05-17 PROCEDURE — 32551 INSERTION OF CHEST TUBE: CPT | Mod: RT | Performed by: THORACIC SURGERY (CARDIOTHORACIC VASCULAR SURGERY)

## 2024-05-17 PROCEDURE — 0BQ10ZZ REPAIR TRACHEA, OPEN APPROACH: ICD-10-PCS | Performed by: THORACIC SURGERY (CARDIOTHORACIC VASCULAR SURGERY)

## 2024-05-17 PROCEDURE — 250N000011 HC RX IP 250 OP 636: Performed by: THORACIC SURGERY (CARDIOTHORACIC VASCULAR SURGERY)

## 2024-05-17 PROCEDURE — 31770 REPAIR/GRAFT OF BRONCHUS: CPT | Mod: GC | Performed by: THORACIC SURGERY (CARDIOTHORACIC VASCULAR SURGERY)

## 2024-05-17 PROCEDURE — 999N000141 HC STATISTIC PRE-PROCEDURE NURSING ASSESSMENT: Performed by: THORACIC SURGERY (CARDIOTHORACIC VASCULAR SURGERY)

## 2024-05-17 PROCEDURE — 31760 TRACHEOPLASTY INTRATHORACIC: CPT | Performed by: ANESTHESIOLOGY

## 2024-05-17 PROCEDURE — 88305 TISSUE EXAM BY PATHOLOGIST: CPT | Mod: TC | Performed by: THORACIC SURGERY (CARDIOTHORACIC VASCULAR SURGERY)

## 2024-05-17 PROCEDURE — 31760 TRACHEOPLASTY INTRATHORACIC: CPT | Mod: GC | Performed by: THORACIC SURGERY (CARDIOTHORACIC VASCULAR SURGERY)

## 2024-05-17 PROCEDURE — 99233 SBSQ HOSP IP/OBS HIGH 50: CPT | Performed by: ANESTHESIOLOGY

## 2024-05-17 PROCEDURE — 258N000001 HC RX 258

## 2024-05-17 PROCEDURE — 82805 BLOOD GASES W/O2 SATURATION: CPT

## 2024-05-17 PROCEDURE — 250N000009 HC RX 250: Performed by: REGISTERED NURSE

## 2024-05-17 PROCEDURE — 0BV70CZ RESTRICTION OF LEFT MAIN BRONCHUS WITH EXTRALUMINAL DEVICE, OPEN APPROACH: ICD-10-PCS | Performed by: THORACIC SURGERY (CARDIOTHORACIC VASCULAR SURGERY)

## 2024-05-17 PROCEDURE — 200N000002 HC R&B ICU UMMC

## 2024-05-17 PROCEDURE — 272N000001 HC OR GENERAL SUPPLY STERILE: Performed by: THORACIC SURGERY (CARDIOTHORACIC VASCULAR SURGERY)

## 2024-05-17 DEVICE — MESH PROLENE 6X6" PMH: Type: IMPLANTABLE DEVICE | Site: TRACHEA | Status: FUNCTIONAL

## 2024-05-17 RX ORDER — NALOXONE HYDROCHLORIDE 0.4 MG/ML
0.4 INJECTION, SOLUTION INTRAMUSCULAR; INTRAVENOUS; SUBCUTANEOUS
Status: DISCONTINUED | OUTPATIENT
Start: 2024-05-17 | End: 2024-05-23 | Stop reason: HOSPADM

## 2024-05-17 RX ORDER — CLINDAMYCIN PHOSPHATE 900 MG/50ML
900 INJECTION, SOLUTION INTRAVENOUS
Status: COMPLETED | OUTPATIENT
Start: 2024-05-17 | End: 2024-05-17

## 2024-05-17 RX ORDER — CLINDAMYCIN PHOSPHATE 900 MG/50ML
900 INJECTION, SOLUTION INTRAVENOUS SEE ADMIN INSTRUCTIONS
Status: DISCONTINUED | OUTPATIENT
Start: 2024-05-17 | End: 2024-05-17 | Stop reason: HOSPADM

## 2024-05-17 RX ORDER — LIDOCAINE HYDROCHLORIDE 20 MG/ML
INJECTION, SOLUTION INFILTRATION; PERINEURAL PRN
Status: DISCONTINUED | OUTPATIENT
Start: 2024-05-17 | End: 2024-05-17

## 2024-05-17 RX ORDER — DEXAMETHASONE SODIUM PHOSPHATE 4 MG/ML
4 INJECTION, SOLUTION INTRA-ARTICULAR; INTRALESIONAL; INTRAMUSCULAR; INTRAVENOUS; SOFT TISSUE
Status: DISCONTINUED | OUTPATIENT
Start: 2024-05-17 | End: 2024-05-17 | Stop reason: HOSPADM

## 2024-05-17 RX ORDER — ALBUTEROL SULFATE 90 UG/1
AEROSOL, METERED RESPIRATORY (INHALATION) PRN
Status: DISCONTINUED | OUTPATIENT
Start: 2024-05-17 | End: 2024-05-17

## 2024-05-17 RX ORDER — DEXTROSE MONOHYDRATE 25 G/50ML
INJECTION, SOLUTION INTRAVENOUS PRN
Status: DISCONTINUED | OUTPATIENT
Start: 2024-05-17 | End: 2024-05-17

## 2024-05-17 RX ORDER — HYDROMORPHONE HCL IN WATER/PF 6 MG/30 ML
0.2 PATIENT CONTROLLED ANALGESIA SYRINGE INTRAVENOUS EVERY 5 MIN PRN
Status: DISCONTINUED | OUTPATIENT
Start: 2024-05-17 | End: 2024-05-17 | Stop reason: HOSPADM

## 2024-05-17 RX ORDER — PROPOFOL 10 MG/ML
INJECTION, EMULSION INTRAVENOUS CONTINUOUS PRN
Status: DISCONTINUED | OUTPATIENT
Start: 2024-05-17 | End: 2024-05-17

## 2024-05-17 RX ORDER — NALOXONE HYDROCHLORIDE 0.4 MG/ML
0.1 INJECTION, SOLUTION INTRAMUSCULAR; INTRAVENOUS; SUBCUTANEOUS
Status: DISCONTINUED | OUTPATIENT
Start: 2024-05-17 | End: 2024-05-17 | Stop reason: HOSPADM

## 2024-05-17 RX ORDER — CALCIUM CHLORIDE 100 MG/ML
INJECTION INTRAVENOUS; INTRAVENTRICULAR PRN
Status: DISCONTINUED | OUTPATIENT
Start: 2024-05-17 | End: 2024-05-17

## 2024-05-17 RX ORDER — HALOPERIDOL 5 MG/ML
1 INJECTION INTRAMUSCULAR
Status: DISCONTINUED | OUTPATIENT
Start: 2024-05-17 | End: 2024-05-17 | Stop reason: HOSPADM

## 2024-05-17 RX ORDER — NALOXONE HYDROCHLORIDE 0.4 MG/ML
0.2 INJECTION, SOLUTION INTRAMUSCULAR; INTRAVENOUS; SUBCUTANEOUS
Status: DISCONTINUED | OUTPATIENT
Start: 2024-05-17 | End: 2024-05-23 | Stop reason: HOSPADM

## 2024-05-17 RX ORDER — DEXTROSE MONOHYDRATE 25 G/50ML
25-50 INJECTION, SOLUTION INTRAVENOUS
Status: DISCONTINUED | OUTPATIENT
Start: 2024-05-17 | End: 2024-05-23 | Stop reason: HOSPADM

## 2024-05-17 RX ORDER — SODIUM CHLORIDE, SODIUM LACTATE, POTASSIUM CHLORIDE, CALCIUM CHLORIDE 600; 310; 30; 20 MG/100ML; MG/100ML; MG/100ML; MG/100ML
INJECTION, SOLUTION INTRAVENOUS CONTINUOUS
Status: DISCONTINUED | OUTPATIENT
Start: 2024-05-17 | End: 2024-05-18

## 2024-05-17 RX ORDER — NICOTINE POLACRILEX 4 MG
15-30 LOZENGE BUCCAL
Status: DISCONTINUED | OUTPATIENT
Start: 2024-05-17 | End: 2024-05-23 | Stop reason: HOSPADM

## 2024-05-17 RX ORDER — ACETAMINOPHEN 325 MG/1
650 TABLET ORAL EVERY 4 HOURS PRN
Status: DISCONTINUED | OUTPATIENT
Start: 2024-05-20 | End: 2024-05-23

## 2024-05-17 RX ORDER — HYDRALAZINE HYDROCHLORIDE 20 MG/ML
2.5-5 INJECTION INTRAMUSCULAR; INTRAVENOUS EVERY 10 MIN PRN
Status: DISCONTINUED | OUTPATIENT
Start: 2024-05-17 | End: 2024-05-17 | Stop reason: HOSPADM

## 2024-05-17 RX ORDER — ALBUTEROL SULFATE 0.83 MG/ML
2.5 SOLUTION RESPIRATORY (INHALATION)
Status: DISCONTINUED | OUTPATIENT
Start: 2024-05-18 | End: 2024-05-17

## 2024-05-17 RX ORDER — METHOCARBAMOL 750 MG/1
750 TABLET, FILM COATED ORAL EVERY 6 HOURS PRN
Status: DISCONTINUED | OUTPATIENT
Start: 2024-05-17 | End: 2024-05-23 | Stop reason: HOSPADM

## 2024-05-17 RX ORDER — FENTANYL CITRATE 50 UG/ML
50 INJECTION, SOLUTION INTRAMUSCULAR; INTRAVENOUS EVERY 5 MIN PRN
Status: DISCONTINUED | OUTPATIENT
Start: 2024-05-17 | End: 2024-05-17 | Stop reason: HOSPADM

## 2024-05-17 RX ORDER — KETAMINE HYDROCHLORIDE 10 MG/ML
INJECTION INTRAMUSCULAR; INTRAVENOUS PRN
Status: DISCONTINUED | OUTPATIENT
Start: 2024-05-17 | End: 2024-05-17

## 2024-05-17 RX ORDER — ONDANSETRON 2 MG/ML
INJECTION INTRAMUSCULAR; INTRAVENOUS PRN
Status: DISCONTINUED | OUTPATIENT
Start: 2024-05-17 | End: 2024-05-17

## 2024-05-17 RX ORDER — PROCHLORPERAZINE MALEATE 5 MG
10 TABLET ORAL EVERY 6 HOURS PRN
Status: DISCONTINUED | OUTPATIENT
Start: 2024-05-17 | End: 2024-05-23 | Stop reason: HOSPADM

## 2024-05-17 RX ORDER — DEXTROSE MONOHYDRATE 100 MG/ML
INJECTION, SOLUTION INTRAVENOUS CONTINUOUS PRN
Status: DISCONTINUED | OUTPATIENT
Start: 2024-05-17 | End: 2024-05-23 | Stop reason: HOSPADM

## 2024-05-17 RX ORDER — ENOXAPARIN SODIUM 100 MG/ML
40 INJECTION SUBCUTANEOUS EVERY 24 HOURS
Status: DISCONTINUED | OUTPATIENT
Start: 2024-05-18 | End: 2024-05-18

## 2024-05-17 RX ORDER — FENTANYL CITRATE 50 UG/ML
25 INJECTION, SOLUTION INTRAMUSCULAR; INTRAVENOUS EVERY 5 MIN PRN
Status: DISCONTINUED | OUTPATIENT
Start: 2024-05-17 | End: 2024-05-17 | Stop reason: HOSPADM

## 2024-05-17 RX ORDER — BISACODYL 10 MG
10 SUPPOSITORY, RECTAL RECTAL DAILY PRN
Status: DISCONTINUED | OUTPATIENT
Start: 2024-05-20 | End: 2024-05-23 | Stop reason: HOSPADM

## 2024-05-17 RX ORDER — ONDANSETRON 4 MG/1
4 TABLET, ORALLY DISINTEGRATING ORAL EVERY 6 HOURS PRN
Status: DISCONTINUED | OUTPATIENT
Start: 2024-05-17 | End: 2024-05-23 | Stop reason: HOSPADM

## 2024-05-17 RX ORDER — ONDANSETRON 2 MG/ML
4 INJECTION INTRAMUSCULAR; INTRAVENOUS EVERY 6 HOURS PRN
Status: DISCONTINUED | OUTPATIENT
Start: 2024-05-17 | End: 2024-05-23 | Stop reason: HOSPADM

## 2024-05-17 RX ORDER — AMOXICILLIN 250 MG
1 CAPSULE ORAL 2 TIMES DAILY
Status: DISCONTINUED | OUTPATIENT
Start: 2024-05-17 | End: 2024-05-21

## 2024-05-17 RX ORDER — OXYCODONE HYDROCHLORIDE 10 MG/1
10 TABLET ORAL EVERY 4 HOURS PRN
Status: DISCONTINUED | OUTPATIENT
Start: 2024-05-17 | End: 2024-05-18

## 2024-05-17 RX ORDER — FENTANYL CITRATE 50 UG/ML
INJECTION, SOLUTION INTRAMUSCULAR; INTRAVENOUS PRN
Status: DISCONTINUED | OUTPATIENT
Start: 2024-05-17 | End: 2024-05-17

## 2024-05-17 RX ORDER — ALBUTEROL SULFATE 0.83 MG/ML
2.5 SOLUTION RESPIRATORY (INHALATION)
Status: DISCONTINUED | OUTPATIENT
Start: 2024-05-18 | End: 2024-05-23 | Stop reason: HOSPADM

## 2024-05-17 RX ORDER — ONDANSETRON 4 MG/1
4 TABLET, ORALLY DISINTEGRATING ORAL EVERY 30 MIN PRN
Status: DISCONTINUED | OUTPATIENT
Start: 2024-05-17 | End: 2024-05-17 | Stop reason: HOSPADM

## 2024-05-17 RX ORDER — PROPOFOL 10 MG/ML
INJECTION, EMULSION INTRAVENOUS PRN
Status: DISCONTINUED | OUTPATIENT
Start: 2024-05-17 | End: 2024-05-17

## 2024-05-17 RX ORDER — HYDROMORPHONE HCL IN WATER/PF 6 MG/30 ML
0.4 PATIENT CONTROLLED ANALGESIA SYRINGE INTRAVENOUS EVERY 5 MIN PRN
Status: DISCONTINUED | OUTPATIENT
Start: 2024-05-17 | End: 2024-05-17 | Stop reason: HOSPADM

## 2024-05-17 RX ORDER — DEXAMETHASONE SODIUM PHOSPHATE 4 MG/ML
INJECTION, SOLUTION INTRA-ARTICULAR; INTRALESIONAL; INTRAMUSCULAR; INTRAVENOUS; SOFT TISSUE PRN
Status: DISCONTINUED | OUTPATIENT
Start: 2024-05-17 | End: 2024-05-17

## 2024-05-17 RX ORDER — OXYCODONE HYDROCHLORIDE 5 MG/1
5 TABLET ORAL EVERY 4 HOURS PRN
Status: DISCONTINUED | OUTPATIENT
Start: 2024-05-17 | End: 2024-05-18

## 2024-05-17 RX ORDER — ACETAMINOPHEN 325 MG/1
975 TABLET ORAL EVERY 8 HOURS
Status: COMPLETED | OUTPATIENT
Start: 2024-05-17 | End: 2024-05-20

## 2024-05-17 RX ORDER — SODIUM CHLORIDE, SODIUM GLUCONATE, SODIUM ACETATE, POTASSIUM CHLORIDE AND MAGNESIUM CHLORIDE 526; 502; 368; 37; 30 MG/100ML; MG/100ML; MG/100ML; MG/100ML; MG/100ML
INJECTION, SOLUTION INTRAVENOUS CONTINUOUS PRN
Status: DISCONTINUED | OUTPATIENT
Start: 2024-05-17 | End: 2024-05-17

## 2024-05-17 RX ORDER — HYDROXYZINE HYDROCHLORIDE 25 MG/1
25 TABLET, FILM COATED ORAL EVERY 6 HOURS PRN
Status: DISCONTINUED | OUTPATIENT
Start: 2024-05-17 | End: 2024-05-21

## 2024-05-17 RX ORDER — ONDANSETRON 2 MG/ML
4 INJECTION INTRAMUSCULAR; INTRAVENOUS EVERY 30 MIN PRN
Status: DISCONTINUED | OUTPATIENT
Start: 2024-05-17 | End: 2024-05-17 | Stop reason: HOSPADM

## 2024-05-17 RX ADMIN — HYDROMORPHONE HYDROCHLORIDE 0.5 MG: 1 INJECTION, SOLUTION INTRAMUSCULAR; INTRAVENOUS; SUBCUTANEOUS at 17:30

## 2024-05-17 RX ADMIN — CLINDAMYCIN PHOSPHATE 900 MG: 900 INJECTION, SOLUTION INTRAVENOUS at 14:06

## 2024-05-17 RX ADMIN — FENTANYL CITRATE 100 MCG: 50 INJECTION INTRAMUSCULAR; INTRAVENOUS at 09:37

## 2024-05-17 RX ADMIN — PROPOFOL 10 MG: 10 INJECTION, EMULSION INTRAVENOUS at 16:52

## 2024-05-17 RX ADMIN — DEXMEDETOMIDINE HYDROCHLORIDE 18 MCG: 100 INJECTION, SOLUTION INTRAVENOUS at 21:45

## 2024-05-17 RX ADMIN — Medication 20 MG: at 15:25

## 2024-05-17 RX ADMIN — Medication 20 MG: at 14:42

## 2024-05-17 RX ADMIN — DEXTROSE 50 % IN WATER (D50W) INTRAVENOUS SYRINGE 12.5 G: at 10:22

## 2024-05-17 RX ADMIN — PHENYLEPHRINE HYDROCHLORIDE 200 MCG: 10 INJECTION INTRAVENOUS at 10:34

## 2024-05-17 RX ADMIN — FENTANYL CITRATE 50 MCG: 50 INJECTION INTRAMUSCULAR; INTRAVENOUS at 13:45

## 2024-05-17 RX ADMIN — DEXAMETHASONE SODIUM PHOSPHATE 4 MG: 4 INJECTION, SOLUTION INTRA-ARTICULAR; INTRALESIONAL; INTRAMUSCULAR; INTRAVENOUS; SOFT TISSUE at 10:28

## 2024-05-17 RX ADMIN — SODIUM CHLORIDE, POTASSIUM CHLORIDE, SODIUM LACTATE AND CALCIUM CHLORIDE: 600; 310; 30; 20 INJECTION, SOLUTION INTRAVENOUS at 23:52

## 2024-05-17 RX ADMIN — LIDOCAINE HYDROCHLORIDE 100 MG: 20 INJECTION, SOLUTION INFILTRATION; PERINEURAL at 09:37

## 2024-05-17 RX ADMIN — Medication 10 MG: at 12:49

## 2024-05-17 RX ADMIN — Medication 10 MG: at 14:02

## 2024-05-17 RX ADMIN — Medication 20 MG: at 16:08

## 2024-05-17 RX ADMIN — Medication 20 MG: at 13:10

## 2024-05-17 RX ADMIN — SUGAMMADEX 200 MG: 100 INJECTION, SOLUTION INTRAVENOUS at 19:33

## 2024-05-17 RX ADMIN — HYDROXYZINE HYDROCHLORIDE 25 MG: 25 TABLET, FILM COATED ORAL at 23:24

## 2024-05-17 RX ADMIN — PROPOFOL 150 MCG/KG/MIN: 10 INJECTION, EMULSION INTRAVENOUS at 10:30

## 2024-05-17 RX ADMIN — HYDROMORPHONE HYDROCHLORIDE 0.25 MG: 1 INJECTION, SOLUTION INTRAMUSCULAR; INTRAVENOUS; SUBCUTANEOUS at 18:25

## 2024-05-17 RX ADMIN — PROPOFOL 40 MG: 10 INJECTION, EMULSION INTRAVENOUS at 09:48

## 2024-05-17 RX ADMIN — CALCIUM CHLORIDE INJECTION 500 MG: 100 INJECTION, SOLUTION INTRAVENOUS at 13:08

## 2024-05-17 RX ADMIN — PROPOFOL 40 MG: 10 INJECTION, EMULSION INTRAVENOUS at 09:42

## 2024-05-17 RX ADMIN — Medication 20 MG: at 12:15

## 2024-05-17 RX ADMIN — Medication 10 MG: at 18:00

## 2024-05-17 RX ADMIN — PROPOFOL 40 MG: 10 INJECTION, EMULSION INTRAVENOUS at 09:39

## 2024-05-17 RX ADMIN — HYDROMORPHONE HYDROCHLORIDE 0.25 MG: 1 INJECTION, SOLUTION INTRAMUSCULAR; INTRAVENOUS; SUBCUTANEOUS at 18:09

## 2024-05-17 RX ADMIN — PROPOFOL 60 MG: 10 INJECTION, EMULSION INTRAVENOUS at 09:37

## 2024-05-17 RX ADMIN — PHENYLEPHRINE HYDROCHLORIDE 0.3 MCG/KG/MIN: 10 INJECTION INTRAVENOUS at 10:30

## 2024-05-17 RX ADMIN — Medication 10 MG: at 17:31

## 2024-05-17 RX ADMIN — PHENYLEPHRINE HYDROCHLORIDE 200 MCG: 10 INJECTION INTRAVENOUS at 10:29

## 2024-05-17 RX ADMIN — FENTANYL CITRATE 50 MCG: 50 INJECTION INTRAMUSCULAR; INTRAVENOUS at 09:34

## 2024-05-17 RX ADMIN — ALBUTEROL SULFATE 3 PUFF: 108 INHALANT RESPIRATORY (INHALATION) at 10:05

## 2024-05-17 RX ADMIN — FENTANYL CITRATE 50 MCG: 50 INJECTION INTRAMUSCULAR; INTRAVENOUS at 09:50

## 2024-05-17 RX ADMIN — HYDROMORPHONE HYDROCHLORIDE 0.25 MG: 1 INJECTION, SOLUTION INTRAMUSCULAR; INTRAVENOUS; SUBCUTANEOUS at 17:18

## 2024-05-17 RX ADMIN — Medication 20 MG: at 13:45

## 2024-05-17 RX ADMIN — INSULIN HUMAN 5 UNITS/HR: 1 INJECTION, SOLUTION INTRAVENOUS at 23:36

## 2024-05-17 RX ADMIN — CLINDAMYCIN PHOSPHATE 900 MG: 900 INJECTION, SOLUTION INTRAVENOUS at 08:01

## 2024-05-17 RX ADMIN — Medication 100 MG: at 09:43

## 2024-05-17 RX ADMIN — ACETAMINOPHEN 975 MG: 325 TABLET, FILM COATED ORAL at 23:24

## 2024-05-17 RX ADMIN — ONDANSETRON 4 MG: 2 INJECTION INTRAMUSCULAR; INTRAVENOUS at 18:32

## 2024-05-17 RX ADMIN — FENTANYL CITRATE 50 MCG: 50 INJECTION INTRAMUSCULAR; INTRAVENOUS at 11:02

## 2024-05-17 RX ADMIN — HYDROMORPHONE HYDROCHLORIDE 0.25 MG: 1 INJECTION, SOLUTION INTRAMUSCULAR; INTRAVENOUS; SUBCUTANEOUS at 16:54

## 2024-05-17 RX ADMIN — DOCUSATE SODIUM 50 MG AND SENNOSIDES 8.6 MG 1 TABLET: 8.6; 5 TABLET, FILM COATED ORAL at 23:24

## 2024-05-17 RX ADMIN — Medication 10 MG: at 18:25

## 2024-05-17 RX ADMIN — OXYCODONE HYDROCHLORIDE 10 MG: 10 TABLET ORAL at 23:25

## 2024-05-17 RX ADMIN — SODIUM CHLORIDE, SODIUM GLUCONATE, SODIUM ACETATE, POTASSIUM CHLORIDE AND MAGNESIUM CHLORIDE: 526; 502; 368; 37; 30 INJECTION, SOLUTION INTRAVENOUS at 09:07

## 2024-05-17 RX ADMIN — DEXMEDETOMIDINE HYDROCHLORIDE 30 MCG: 100 INJECTION, SOLUTION INTRAVENOUS at 20:24

## 2024-05-17 RX ADMIN — Medication 10 MG: at 13:00

## 2024-05-17 RX ADMIN — Medication 20 MG: at 16:54

## 2024-05-17 RX ADMIN — Medication 30 MG: at 11:40

## 2024-05-17 RX ADMIN — Medication 20 MG: at 14:46

## 2024-05-17 ASSESSMENT — ACTIVITIES OF DAILY LIVING (ADL)
ADLS_ACUITY_SCORE: 20
ADLS_ACUITY_SCORE: 28
ADLS_ACUITY_SCORE: 20

## 2024-05-17 NOTE — PROVIDER NOTIFICATION
Paged at [phone]. preop 2 Brendan Helms. pt needs to be marked and also wife has questions for surgical team before procedure.  please advise thanks eladio giron *[phone]

## 2024-05-17 NOTE — H&P
SURGICAL ICU ADMISSION NOTE  05/18/2024      PRIMARY TEAM: Thoracic  PRIMARY PHYSICIAN: Dr. Tim Eubanks  REASON FOR CRITICAL CARE ADMISSION: Airway monitoring  ADMITTING PHYSICIAN: Dr. Tim Eubanks  Date of Service (when I saw the patient): 05/18/2024    ASSESSMENT:  Brendan Helms is a 53 year old male who was admitted on 5/17/2024. He has a Past Medical History significant for Type 1 Diabetes Mellitus, Hyperlipidemia, and tracheobronchomalacia with excessive dynamic airway collapse of the posterior membrane (EDAC) s/p stent trial (04/2024), for which he underwent right thoracotomy, tracheobronchoplasty, and intercostal nerve cryoablation. Post-operatively, he was admitted to the SICU for close airway monitoring.      PLAN:    Neurological:  # Acute pain   - Monitor neurological status. Delirium preventions and precautions.   - Pain: tylenol, prn hydroxyzine, prn robaxin, prn oxy    Pulmonary:   # Asthma  # Acute hypoxic respiratory support  # Tracheobronchomalacia s/p thoracotomy and tracheobronchoplasty  - 2L NC   - chest tube to suction per thoracic   - scheduled albuterol nebs  - Supplemental oxygen to keep saturation above 92 %.  - Incentive spirometer every 15- 30 minutes.     Cardiovascular:    # Hyperlipidemia  - Monitor hemodynamic status.  - PTA Atorvastatin  - HOLD PTA ramipril    Gastroenterology/Nutrition:  - NPO overnight per thoracic surgery  - RD consult. Appreciate cares and recommendations.     Renal/Fluids/Electrolytes:   - Urine output is adequate . Will continue to monitor intake and output.   - limit IVFs    Endocrine:  #Type 1 Diabetes Mellitus  # Stress hyperglycemia   - Goal to keep BG< 180 for optimal wound healing   - Insulin gtt and HALF PTA evening Lantus dose (20u per chart review)    ID:  no indications for antibiotics.     Positive cultures:  N/A    Heme:     - Hemoglobin 14.9  - Transfuse if hgb <7.0 or signs/symptoms of hypoperfusion. Monitor and trend.      Musculoskeletal/skin:  # Weakness and deconditioning of critical illness   - Physical and occupational therapy consult       General Cares/Prophylaxis:    DVT Prophylaxis: Pneumatic Compression Devices. Chemical prophylaxis POD#1   GI Prophylaxis: Not indicated  Restraints: Restraints for medical healing needed: NO    Lines/ tubes/ drains:  - PIV  - jeremy  - davies  - chest tube    Disposition:  -  Surgical ICU    Patient seen, findings and plan discussed with surgical ICU staff, Dr. Barnes.    Martha Lowe MD  - - - - - - - - - - - - - - - - - - - - - - - - - - - - - - - - - - - - - - - - - - - - - -   HISTORY PRESENTING ILLNESS:   Brendan Helms is a 53 year old male who was admitted on 5/17/2024. He has a Past Medical History significant for Type 1 Diabetes Mellitus, Hyperlipidemia, and Tracheobronchomalacia with Excessive Dynamic Airway Collapse of the Posterior Membrane (EDAC) s/p stent trial (04/2024), for which he underwent Right  thoracotomy, tracheobronchoplasty, and intercostal nerve cryoablation.   Patient presented with exertional dyspnea. He can't even shovel snow for more than 5 min without getting winded. He has mild coughing spells in the morning soon after he gets in the shower. Ultimately bronchscopy was performed discovering tracheobronchomalacea with Excessive Dynamic Airway Collapse of the Posterior Membrane. A stent trial was performed in April and he found some relief with stent placement, and ultimately agreed to undergo the above procedure electively.  Reports some shortness of breath right now.     REVIEW OF SYSTEMS: 10 point ROS neg other than the symptoms noted above in the HPI.    PAST MEDICAL HISTORY:   Past Medical History:   Diagnosis Date    Diabetes, type I     HLD (hyperlipidemia)     Tracheobronchomalacia        SURGICAL HISTORY:   Past Surgical History:   Procedure Laterality Date    BRONCHOSCOPY (RIGID OR FLEXIBLE), DIAGNOSTIC N/A 7/13/2023    Procedure: BRONCHOSCOPY,  WITH BRONCHOALVEOLAR LAVAGE;  Surgeon: Rod Desai MD;  Location: [facility]    BRONCHOSCOPY, DILATE BRONCHUS, STENT BRONCHUS, COMBINED N/A 4/5/2024    Procedure: BRONCHOSCOPY, RIGID, flexible bronchoscopy , stent placement;  Surgeon: Rod Desai MD;  Location: [facility] OR    BRONCHOSCOPY, DILATE BRONCHUS, STENT BRONCHUS, COMBINED N/A 4/19/2024    Procedure: BRONCHOSCOPY, RIGID,  flexible bronchoscopy , stent removal, cryoprobe tissue debulking;  Surgeon: Ruthy George MD;  Location: [facility] OR       SOCIAL HISTORY:   Social History     Socioeconomic History    Marital status:      Spouse name: None    Number of children: None    Years of education: None    Highest education level: None   Tobacco Use    Smoking status: Never    Smokeless tobacco: Never   Vaping Use    Vaping status: Never Used   Substance and Sexual Activity    Alcohol use: Not Currently     Comment: occasional    Drug use: Never     Social Determinants of Health     Food Insecurity: Patient Declined (4/1/2024)    [facility]    Hunger Vital Sign     Worried About Running Out of Food in the Last Year: Patient declined     Ran Out of Food in the Last Year: Patient declined   Transportation Needs: Patient Declined (4/1/2024)    [facility]    PRAPARE - Transportation     Lack of Transportation (Medical): Patient declined     Lack of Transportation (Non-Medical): Patient declined   Housing Stability: Patient Declined (4/1/2024)    [facility]     Retired - What is your living situation today? : Patient refused     FAMILY HISTORY: No bleeding/clotting disorders nor problems with anesthesia.    ALLERGIES:   Allergies   Allergen Reactions    Amoxicillin-Pot Clavulanate Hives        MEDICATIONS:  Current Facility-Administered Medications   Medication Dose Route Frequency Provider Last Rate Last Admin    [START ON 5/20/2024] acetaminophen (TYLENOL) tablet 650 mg  650 mg Oral Q4H PRN Martha Lowe MD        acetaminophen (TYLENOL) tablet 975 mg  975 mg Oral Q8H Martha Lowe MD   975 mg at 05/17/24 2324    albuterol (PROVENTIL) neb solution 2.5 mg  2.5 mg Nebulization Q6H Martha Lowe MD        atorvastatin (LIPITOR) tablet 40 mg  40 mg Oral At Bedtime Martha Lowe MD        [START ON 5/20/2024] bisacodyl (DULCOLAX) suppository 10 mg  10 mg Rectal Daily PRN Martha Lowe MD        dextrose 10% infusion   Intravenous Continuous PRN Mratha Lowe MD        glucose gel 15-30 g  15-30 g Oral Q15 Min PRN Martha Lowe MD        Or    dextrose 50 % injection 25-50 mL  25-50 mL Intravenous Q15 Min PRN Martha Lowe MD        Or    glucagon injection 1 mg  1 mg Subcutaneous Q15 Min PRN Martha Lowe MD        enoxaparin ANTICOAGULANT (LOVENOX) injection 40 mg  40 mg Subcutaneous Q24H Martha Lowe MD        hydrOXYzine HCl (ATARAX) tablet 25 mg  25 mg Oral Q6H PRN Martha Lowe MD   25 mg at 05/17/24 2324    insulin glargine (LANTUS PEN) injection 20 Units  20 Units Subcutaneous At Bedtime Martha Lowe MD   20 Units at 05/18/24 0032    insulin regular (MYXREDLIN) 1 unit/mL infusion  0-24 Units/hr Intravenous Continuous Martha Lowe MD 3.5 mL/hr at 05/18/24 0100 3.5 Units/hr at 05/18/24 0100    lactated ringers infusion   Intravenous Continuous Martha Lowe MD 10 mL/hr at 05/18/24 0000 Rate Verify at 05/18/24 0000    [START ON 5/19/2024] magnesium hydroxide (MILK OF MAGNESIA) suspension 30 mL  30 mL Oral Daily PRN Martha Lowe MD        methocarbamol (ROBAXIN) tablet 750 mg  750 mg Oral Q6H PRN Martha Lowe MD   750 mg at 05/18/24 0018    naloxone (NARCAN) injection 0.2 mg  0.2 mg Intravenous Q2  Min PRN Lisa Gandara MD        Or    naloxone (NARCAN) injection 0.4 mg  0.4 mg Intravenous Q2 Min PRN Lisa Gandara MD        Or    naloxone (NARCAN) injection 0.2 mg  0.2 mg Intramuscular Q2 Min PRN Lisa Gandara MD        Or    naloxone (NARCAN) injection 0.4 mg  0.4 mg Intramuscular Q2 Min PRN Lisa Gandara MD        ondansetron (ZOFRAN ODT) ODT tab 4 mg  4 mg Oral Q6H PRN Martha Lowe MD        Or    ondansetron (ZOFRAN) injection 4 mg  4 mg Intravenous Q6H PRN Martha Lowe MD        oxyCODONE (ROXICODONE) tablet 5 mg  5 mg Oral Q4H PRN Martha Lowe MD        Or    oxyCODONE IR (ROXICODONE) tablet 10 mg  10 mg Oral Q4H PRN Martha Lowe MD   10 mg at 05/17/24 2325    prochlorperazine (COMPAZINE) injection 10 mg  10 mg Intravenous Q6H PRN Martha Lowe MD        Or    prochlorperazine (COMPAZINE) tablet 10 mg  10 mg Oral Q6H PRN Martha Lowe MD        senna-docusate (SENOKOT-S/PERICOLACE) 8.6-50 MG per tablet 1 tablet  1 tablet Oral BID Martha Lowe MD   1 tablet at 05/17/24 2324    tamsulosin (FLOMAX) capsule 0.4 mg  0.4 mg Oral Daily Martha Lowe MD           PHYSICAL EXAMINATION:  Temp:  [97.4  F (36.3  C)-98.6  F (37  C)] 98.6  F (37  C)  Pulse:  [] 72  Resp:  [14-18] 18  BP: (114-143)/(73-92) 120/85  MAP:  [66 mmHg-110 mmHg] 80 mmHg  Arterial Line BP: ()/(53-90) 114/61  SpO2:  [92 %-97 %] 95 %  General: Alert, in no acute distress.  Neuro: A&Ox3  HEENT: Normocephalic, atraumatic. Patent nares.  Respiratory: Non-labored breathing. Lung sounds bilateral crackles. R chest tube in place. Incision with dressing  Cardiovascular: Regular rate and rhythm.   Gastrointestinal: Abdomen soft, non-distended, non-tender to palpation.   Genitourinary: davies catheter in place, urine yellow and clear  MSK/Extremities: Moving all four extremities. No limb deformities. No peripheral edema.  Incisions/Skin: As noted  above. No rashes or lesions appreciated.      LABS: Reviewed.   Arterial Blood Gases   Recent Labs   Lab 05/17/24 1646 05/17/24  1530 05/17/24  1303 05/17/24  1137   PH 7.28* 7.26* 7.30* 7.32*   PCO2 59* 62* 56* 55*   PO2 92 85 125* 89   HCO3 28 28 27 29*     Complete Blood Count   Recent Labs   Lab 05/17/24 1646 05/17/24  1530 05/17/24  1303 05/17/24  1137   HGB 14.9 14.5 14.1 14.4     Basic Metabolic Panel  Recent Labs   Lab 05/18/24  0040 05/17/24  2336 05/17/24 2330 05/17/24 2232 05/17/24 2006 05/17/24 1646 05/17/24  1530 05/17/24  1303 05/17/24  1137   NA  --   --   --   --   --  138 139 140 140   POTASSIUM  --   --   --   --   --  5.2 4.8 4.1 4.2   CR  --   --  1.06  --   --   --   --   --   --    * 362*  --  333* 107* 196* 158* 111* 73     Liver Function Tests  No lab results found in last 7 days.  Pancreatic Enzymes  No lab results found in last 7 days.  Coagulation Profile  No lab results found in last 7 days.    IMAGING:  Recent Results (from the past 24 hour(s))   XR Chest Port 1 View    Impression    RESIDENT PRELIMINARY INTERPRETATION  Impression:   1. Right thoracotomy. Small right pneumothorax with chest tube in  place.  2. Postoperative right chest wall subcutaneous emphysema.  3. Question pneumomediastinum.

## 2024-05-17 NOTE — ANESTHESIA PROCEDURE NOTES
Airway       Patient location during procedure: OR       Procedure Start/Stop Times: 5/17/2024 9:48 AM  Staff -        Anesthesiologist:  Juanjose Bolton MD       Resident/Fellow: Collins Araujo MD       Performed By: resident  Consent for Airway        Urgency: elective  Indications and Patient Condition       Indications for airway management: remy-procedural       Induction type:intravenous       Mask difficulty assessment: 3 - difficult mask (inadequate, unstable, or two providers) +/- NMBA    Final Airway Details       Final airway type: endotracheal airway       Successful airway: ETT - double lumen left  Endotracheal Airway Details        Successful intubation technique: video laryngoscopy and flexible bronchoscopy       VL Blade Size: MAC 4       Grade View of Cords: 1       Adjucts: stylet       Bite block used: None       ETT Double lumen (fr): 37    Post intubation assessment        Placement verified by: capnometry, equal breath sounds and chest rise        Number of attempts at approach: 1       Number of other approaches attempted: 0       Secured with: tape and commercial tube rushing       Ease of procedure: easy       Dentition: Intact and Unchanged    Medication(s) Administered   Medication Administration Time: 5/17/2024 9:48 AM

## 2024-05-17 NOTE — ANESTHESIA PROCEDURE NOTES
Arterial Line Procedure Note    Pre-Procedure   Staff -        Anesthesiologist:  Juanjose Bolton MD       Resident/Fellow: Collins Araujo MD       Performed By: resident       Location: OR       Pre-Anesthestic Checklist: patient identified, IV checked, risks and benefits discussed, informed consent, monitors and equipment checked, pre-op evaluation and at physician/surgeon's request  Timeout:       Correct Patient: Yes        Correct Procedure: Yes        Correct Site: Yes        Correct Position: Yes   Line Placement:   This line was placed Post Induction starting at 5/17/2024 9:48 AM and ending at 5/17/2024 9:53 AM  Procedure   Procedure: arterial line       Diagnosis: Hemodynamic monitoring       Laterality: right       Insertion Site: radial.  Sterile Prep        Standard elements of sterile barrier followed       Skin prep: Chloraprep  Insertion/Injection        Technique: ultrasound guided and Seldinger Technique        1. Ultrasound was used to evaluate the access site.       2. Artery evaluated via ultrasound for patency/adequacy.       3. Using real-time ultrasound the needle/catheter was observed entering the artery/vein.       Catheter Type/Size: 20 G, 12 cm  Narrative         Secured by: suture       Tegaderm and Biopatch dressing used.       Complications: None apparent,        Arterial waveform: Yes        IBP within 10% of NIBP: Yes   Comments:  Routine placement on first attempt. No apparent complications.

## 2024-05-17 NOTE — PROGRESS NOTES
INTENSIVIST FACULTY NOTE:  53M hx tracheobronchomalacia and excessive dynamic airway collapse of posterior membrane, DM1, and HLD  Recent stent trial showed improvement in respiration  To OR for thoracoscopic tracheobronchoplasty   Easily extubated post-op; stable    Still significant pain.    Good tidaling in chest tube  Rest of exam is fine    Labs unremarkable other than slight hyperglycemia    Imaging also unremarkable    This is a 53M type 1 diabetic with HLD, tracheomalacia s/p tracheobronchoplasty.   Today he is stable for the floor.  His davies & arterial line can come out, he can have a diet and go back to his home insulin regimen.    TRACHEOMALACIA:  -s/p tracheobronchoplasty  -IV and enteral narcotics for analgesia  -consult pain service for possible neuraxial or other block given inadequate analgesia with his cryoablation    HLD:  -lipitor    Hx HTN:  -hold home ramipril    DM1:  -gave half his home lantus dose overnight (he is typically on 35U qam and 40U qpm), but he can get his whole home regimen now  -transition from insulin infusion to sliding scale insulin     MISC:  -Code status is full code  -family updated at bedside  -DVT prophylaxis plan will be dependent on whether or not gets an epidural - either lovenox this evening if no neuraxial block, or SQH if gets an epidural; PPI not necessary  -lines: radial arterial line can come out  -davies can come out   -anticipate discharge to home in less than a week  -to floor today        Billing statement: 35min of E&M time.    AUSTEN Avalos MD  Clinical   Anesthesia / Critical Care  *[phone]

## 2024-05-18 ENCOUNTER — APPOINTMENT (OUTPATIENT)
Dept: GENERAL RADIOLOGY | Facility: CLINIC | Age: 54
DRG: 164 | End: 2024-05-18
Payer: COMMERCIAL

## 2024-05-18 ENCOUNTER — APPOINTMENT (OUTPATIENT)
Dept: PHYSICAL THERAPY | Facility: CLINIC | Age: 54
DRG: 164 | End: 2024-05-18
Payer: COMMERCIAL

## 2024-05-18 ENCOUNTER — ANESTHESIA EVENT (OUTPATIENT)
Dept: GENERAL RADIOLOGY | Facility: CLINIC | Age: 54
DRG: 164 | End: 2024-05-18
Payer: COMMERCIAL

## 2024-05-18 ENCOUNTER — ANESTHESIA (OUTPATIENT)
Dept: GENERAL RADIOLOGY | Facility: CLINIC | Age: 54
DRG: 164 | End: 2024-05-18
Payer: COMMERCIAL

## 2024-05-18 LAB
ANION GAP SERPL CALCULATED.3IONS-SCNC: 11 MMOL/L (ref 7–15)
BUN SERPL-MCNC: 18.8 MG/DL (ref 6–20)
CALCIUM SERPL-MCNC: 7.9 MG/DL (ref 8.6–10)
CHLORIDE SERPL-SCNC: 101 MMOL/L (ref 98–107)
CREAT SERPL-MCNC: 0.96 MG/DL (ref 0.67–1.17)
CREAT SERPL-MCNC: 1.06 MG/DL (ref 0.67–1.17)
DEPRECATED HCO3 PLAS-SCNC: 23 MMOL/L (ref 22–29)
EGFRCR SERPLBLD CKD-EPI 2021: 84 ML/MIN/1.73M2
EGFRCR SERPLBLD CKD-EPI 2021: >90 ML/MIN/1.73M2
ERYTHROCYTE [DISTWIDTH] IN BLOOD BY AUTOMATED COUNT: 13.2 % (ref 10–15)
GLUCOSE BLDC GLUCOMTR-MCNC: 112 MG/DL (ref 70–99)
GLUCOSE BLDC GLUCOMTR-MCNC: 143 MG/DL (ref 70–99)
GLUCOSE BLDC GLUCOMTR-MCNC: 155 MG/DL (ref 70–99)
GLUCOSE BLDC GLUCOMTR-MCNC: 159 MG/DL (ref 70–99)
GLUCOSE BLDC GLUCOMTR-MCNC: 163 MG/DL (ref 70–99)
GLUCOSE BLDC GLUCOMTR-MCNC: 165 MG/DL (ref 70–99)
GLUCOSE BLDC GLUCOMTR-MCNC: 169 MG/DL (ref 70–99)
GLUCOSE BLDC GLUCOMTR-MCNC: 176 MG/DL (ref 70–99)
GLUCOSE BLDC GLUCOMTR-MCNC: 197 MG/DL (ref 70–99)
GLUCOSE BLDC GLUCOMTR-MCNC: 209 MG/DL (ref 70–99)
GLUCOSE BLDC GLUCOMTR-MCNC: 230 MG/DL (ref 70–99)
GLUCOSE BLDC GLUCOMTR-MCNC: 230 MG/DL (ref 70–99)
GLUCOSE BLDC GLUCOMTR-MCNC: 232 MG/DL (ref 70–99)
GLUCOSE BLDC GLUCOMTR-MCNC: 234 MG/DL (ref 70–99)
GLUCOSE BLDC GLUCOMTR-MCNC: 288 MG/DL (ref 70–99)
GLUCOSE BLDC GLUCOMTR-MCNC: 444 MG/DL (ref 70–99)
GLUCOSE BLDC GLUCOMTR-MCNC: 444 MG/DL (ref 70–99)
GLUCOSE BLDC GLUCOMTR-MCNC: 474 MG/DL (ref 70–99)
GLUCOSE BLDC GLUCOMTR-MCNC: 483 MG/DL (ref 70–99)
GLUCOSE BLDC GLUCOMTR-MCNC: 485 MG/DL (ref 70–99)
GLUCOSE SERPL-MCNC: 233 MG/DL (ref 70–99)
HCT VFR BLD AUTO: 41.3 % (ref 40–53)
HGB BLD-MCNC: 14.3 G/DL (ref 13.3–17.7)
MAGNESIUM SERPL-MCNC: 2 MG/DL (ref 1.7–2.3)
MCH RBC QN AUTO: 29.4 PG (ref 26.5–33)
MCHC RBC AUTO-ENTMCNC: 34.6 G/DL (ref 31.5–36.5)
MCV RBC AUTO: 85 FL (ref 78–100)
PHOSPHATE SERPL-MCNC: 3.9 MG/DL (ref 2.5–4.5)
PLATELET # BLD AUTO: 190 10E3/UL (ref 150–450)
POTASSIUM SERPL-SCNC: 4.4 MMOL/L (ref 3.4–5.3)
RBC # BLD AUTO: 4.87 10E6/UL (ref 4.4–5.9)
SODIUM SERPL-SCNC: 135 MMOL/L (ref 135–145)
WBC # BLD AUTO: 7.5 10E3/UL (ref 4–11)

## 2024-05-18 PROCEDURE — 85014 HEMATOCRIT: CPT

## 2024-05-18 PROCEDURE — 250N000009 HC RX 250

## 2024-05-18 PROCEDURE — 250N000013 HC RX MED GY IP 250 OP 250 PS 637

## 2024-05-18 PROCEDURE — 62324 NJX INTERLAMINAR CRV/THRC: CPT | Mod: GC | Performed by: ANESTHESIOLOGY

## 2024-05-18 PROCEDURE — 250N000011 HC RX IP 250 OP 636

## 2024-05-18 PROCEDURE — 258N000003 HC RX IP 258 OP 636: Performed by: ANESTHESIOLOGY

## 2024-05-18 PROCEDURE — 97116 GAIT TRAINING THERAPY: CPT | Mod: GP

## 2024-05-18 PROCEDURE — 71045 X-RAY EXAM CHEST 1 VIEW: CPT | Mod: 26 | Performed by: STUDENT IN AN ORGANIZED HEALTH CARE EDUCATION/TRAINING PROGRAM

## 2024-05-18 PROCEDURE — 97530 THERAPEUTIC ACTIVITIES: CPT | Mod: GP

## 2024-05-18 PROCEDURE — 120N000002 HC R&B MED SURG/OB UMMC

## 2024-05-18 PROCEDURE — 97161 PT EVAL LOW COMPLEX 20 MIN: CPT | Mod: GP

## 2024-05-18 PROCEDURE — 80048 BASIC METABOLIC PNL TOTAL CA: CPT

## 2024-05-18 PROCEDURE — 94640 AIRWAY INHALATION TREATMENT: CPT

## 2024-05-18 PROCEDURE — 999N000065 XR CHEST PORT 1 VIEW

## 2024-05-18 PROCEDURE — 250N000011 HC RX IP 250 OP 636: Performed by: ANESTHESIOLOGY

## 2024-05-18 PROCEDURE — 999N000111 HC STATISTIC OT IP EVAL DEFER

## 2024-05-18 PROCEDURE — 84100 ASSAY OF PHOSPHORUS: CPT | Performed by: THORACIC SURGERY (CARDIOTHORACIC VASCULAR SURGERY)

## 2024-05-18 PROCEDURE — 94640 AIRWAY INHALATION TREATMENT: CPT | Mod: 76

## 2024-05-18 PROCEDURE — 999N000248 HC STATISTIC IV INSERT WITH US BY RN

## 2024-05-18 PROCEDURE — 83735 ASSAY OF MAGNESIUM: CPT

## 2024-05-18 PROCEDURE — 999N000157 HC STATISTIC RCP TIME EA 10 MIN

## 2024-05-18 PROCEDURE — 250N000012 HC RX MED GY IP 250 OP 636 PS 637

## 2024-05-18 PROCEDURE — 250N000012 HC RX MED GY IP 250 OP 636 PS 637: Performed by: STUDENT IN AN ORGANIZED HEALTH CARE EDUCATION/TRAINING PROGRAM

## 2024-05-18 PROCEDURE — 250N000011 HC RX IP 250 OP 636: Performed by: STUDENT IN AN ORGANIZED HEALTH CARE EDUCATION/TRAINING PROGRAM

## 2024-05-18 PROCEDURE — 99207 PR NO BILLABLE SERVICE THIS VISIT: CPT | Performed by: ANESTHESIOLOGY

## 2024-05-18 RX ORDER — ATORVASTATIN CALCIUM 40 MG/1
40 TABLET, FILM COATED ORAL AT BEDTIME
Status: DISCONTINUED | OUTPATIENT
Start: 2024-05-18 | End: 2024-05-23 | Stop reason: HOSPADM

## 2024-05-18 RX ORDER — HYDROMORPHONE HYDROCHLORIDE 1 MG/ML
0.5 INJECTION, SOLUTION INTRAMUSCULAR; INTRAVENOUS; SUBCUTANEOUS EVERY 4 HOURS PRN
Status: DISCONTINUED | OUTPATIENT
Start: 2024-05-18 | End: 2024-05-22

## 2024-05-18 RX ORDER — CALCIUM CARBONATE 500 MG/1
500 TABLET, CHEWABLE ORAL 2 TIMES DAILY PRN
Status: DISCONTINUED | OUTPATIENT
Start: 2024-05-18 | End: 2024-05-23 | Stop reason: HOSPADM

## 2024-05-18 RX ORDER — NICOTINE POLACRILEX 4 MG
15-30 LOZENGE BUCCAL
Status: DISCONTINUED | OUTPATIENT
Start: 2024-05-18 | End: 2024-05-18

## 2024-05-18 RX ORDER — ALBUTEROL SULFATE 90 UG/1
1-2 AEROSOL, METERED RESPIRATORY (INHALATION) EVERY 6 HOURS PRN
Status: DISCONTINUED | OUTPATIENT
Start: 2024-05-18 | End: 2024-05-23 | Stop reason: HOSPADM

## 2024-05-18 RX ORDER — HYDROMORPHONE HYDROCHLORIDE 4 MG/1
4 TABLET ORAL EVERY 4 HOURS PRN
Status: DISCONTINUED | OUTPATIENT
Start: 2024-05-18 | End: 2024-05-23 | Stop reason: HOSPADM

## 2024-05-18 RX ORDER — HEPARIN SODIUM 5000 [USP'U]/.5ML
5000 INJECTION, SOLUTION INTRAVENOUS; SUBCUTANEOUS EVERY 8 HOURS
Status: DISCONTINUED | OUTPATIENT
Start: 2024-05-18 | End: 2024-05-23 | Stop reason: HOSPADM

## 2024-05-18 RX ORDER — NALBUPHINE HYDROCHLORIDE 10 MG/ML
2.5-5 INJECTION, SOLUTION INTRAMUSCULAR; INTRAVENOUS; SUBCUTANEOUS EVERY 6 HOURS PRN
Status: DISCONTINUED | OUTPATIENT
Start: 2024-05-18 | End: 2024-05-23

## 2024-05-18 RX ORDER — LIDOCAINE 4 G/G
1 PATCH TOPICAL
Status: DISCONTINUED | OUTPATIENT
Start: 2024-05-18 | End: 2024-05-23 | Stop reason: HOSPADM

## 2024-05-18 RX ORDER — DEXTROSE MONOHYDRATE 25 G/50ML
25-50 INJECTION, SOLUTION INTRAVENOUS
Status: DISCONTINUED | OUTPATIENT
Start: 2024-05-18 | End: 2024-05-18

## 2024-05-18 RX ORDER — HYDROMORPHONE HYDROCHLORIDE 2 MG/1
2 TABLET ORAL EVERY 4 HOURS PRN
Status: DISCONTINUED | OUTPATIENT
Start: 2024-05-18 | End: 2024-05-23 | Stop reason: HOSPADM

## 2024-05-18 RX ORDER — TAMSULOSIN HYDROCHLORIDE 0.4 MG/1
0.4 CAPSULE ORAL DAILY
Status: DISCONTINUED | OUTPATIENT
Start: 2024-05-18 | End: 2024-05-23 | Stop reason: HOSPADM

## 2024-05-18 RX ADMIN — INSULIN GLARGINE 20 UNITS: 100 INJECTION, SOLUTION SUBCUTANEOUS at 00:32

## 2024-05-18 RX ADMIN — TAMSULOSIN HYDROCHLORIDE 0.4 MG: 0.4 CAPSULE ORAL at 07:59

## 2024-05-18 RX ADMIN — HYDROMORPHONE HYDROCHLORIDE: 1 INJECTION, SOLUTION INTRAMUSCULAR; INTRAVENOUS; SUBCUTANEOUS at 12:54

## 2024-05-18 RX ADMIN — METHOCARBAMOL TABLETS 750 MG: 750 TABLET, COATED ORAL at 08:06

## 2024-05-18 RX ADMIN — ONDANSETRON 4 MG: 2 INJECTION INTRAMUSCULAR; INTRAVENOUS at 03:14

## 2024-05-18 RX ADMIN — ALBUTEROL SULFATE 2.5 MG: 2.5 SOLUTION RESPIRATORY (INHALATION) at 20:28

## 2024-05-18 RX ADMIN — HYDROMORPHONE HYDROCHLORIDE 4 MG: 4 TABLET ORAL at 13:47

## 2024-05-18 RX ADMIN — ALBUTEROL SULFATE 2.5 MG: 2.5 SOLUTION RESPIRATORY (INHALATION) at 13:06

## 2024-05-18 RX ADMIN — HYDROMORPHONE HYDROCHLORIDE 0.5 MG: 1 INJECTION, SOLUTION INTRAMUSCULAR; INTRAVENOUS; SUBCUTANEOUS at 10:06

## 2024-05-18 RX ADMIN — HYDROMORPHONE HYDROCHLORIDE 2 MG: 2 TABLET ORAL at 10:05

## 2024-05-18 RX ADMIN — ACETAMINOPHEN 975 MG: 325 TABLET, FILM COATED ORAL at 23:48

## 2024-05-18 RX ADMIN — OXYCODONE HYDROCHLORIDE 10 MG: 10 TABLET ORAL at 07:59

## 2024-05-18 RX ADMIN — ATORVASTATIN CALCIUM 40 MG: 40 TABLET, FILM COATED ORAL at 21:13

## 2024-05-18 RX ADMIN — HEPARIN SODIUM 5000 UNITS: 5000 INJECTION, SOLUTION INTRAVENOUS; SUBCUTANEOUS at 21:13

## 2024-05-18 RX ADMIN — LIDOCAINE 4% 1 PATCH: 40 PATCH TOPICAL at 19:37

## 2024-05-18 RX ADMIN — HYDROMORPHONE HYDROCHLORIDE 0.5 MG: 1 INJECTION, SOLUTION INTRAMUSCULAR; INTRAVENOUS; SUBCUTANEOUS at 16:48

## 2024-05-18 RX ADMIN — INSULIN ASPART 5 UNITS: 100 INJECTION, SOLUTION INTRAVENOUS; SUBCUTANEOUS at 16:48

## 2024-05-18 RX ADMIN — HYDROMORPHONE HYDROCHLORIDE 0.5 MG: 1 INJECTION, SOLUTION INTRAMUSCULAR; INTRAVENOUS; SUBCUTANEOUS at 21:13

## 2024-05-18 RX ADMIN — ACETAMINOPHEN 975 MG: 325 TABLET, FILM COATED ORAL at 16:42

## 2024-05-18 RX ADMIN — ALBUTEROL SULFATE 2.5 MG: 2.5 SOLUTION RESPIRATORY (INHALATION) at 08:21

## 2024-05-18 RX ADMIN — ACETAMINOPHEN 975 MG: 325 TABLET, FILM COATED ORAL at 07:59

## 2024-05-18 RX ADMIN — METHOCARBAMOL TABLETS 750 MG: 750 TABLET, COATED ORAL at 00:18

## 2024-05-18 RX ADMIN — HYDROMORPHONE HYDROCHLORIDE 0.5 MG: 1 INJECTION, SOLUTION INTRAMUSCULAR; INTRAVENOUS; SUBCUTANEOUS at 05:27

## 2024-05-18 RX ADMIN — HYDROMORPHONE HYDROCHLORIDE 4 MG: 4 TABLET ORAL at 22:38

## 2024-05-18 RX ADMIN — CALCIUM CARBONATE (ANTACID) CHEW TAB 500 MG 500 MG: 500 CHEW TAB at 20:50

## 2024-05-18 RX ADMIN — OXYCODONE HYDROCHLORIDE 10 MG: 10 TABLET ORAL at 03:05

## 2024-05-18 RX ADMIN — METHOCARBAMOL TABLETS 750 MG: 750 TABLET, COATED ORAL at 13:47

## 2024-05-18 RX ADMIN — METHOCARBAMOL TABLETS 750 MG: 750 TABLET, COATED ORAL at 20:21

## 2024-05-18 ASSESSMENT — ACTIVITIES OF DAILY LIVING (ADL)
ADLS_ACUITY_SCORE: 30
ADLS_ACUITY_SCORE: 29
ADLS_ACUITY_SCORE: 29
ADLS_ACUITY_SCORE: 30
ADLS_ACUITY_SCORE: 29
ADLS_ACUITY_SCORE: 30
ADLS_ACUITY_SCORE: 30
ADLS_ACUITY_SCORE: 29
ADLS_ACUITY_SCORE: 28
ADLS_ACUITY_SCORE: 30
ADLS_ACUITY_SCORE: 29
ADLS_ACUITY_SCORE: 29

## 2024-05-18 NOTE — ANESTHESIA CARE TRANSFER NOTE
Patient: Brendan Helms    Procedure: Procedure(s):  Thoracotomy, Tracheobronchoplasty, Intercostal Nerve Cryoablation, Flexible Bronchoscopy       Diagnosis: Tracheobronchomalacia [J39.8]  Diagnosis Additional Information: No value filed.    Anesthesia Type:   General     Note:    Oropharynx: oropharynx clear of all foreign objects and spontaneously breathing  Level of Consciousness: awake  Oxygen Supplementation: face mask  Level of Supplemental Oxygen (L/min / FiO2): 10  Independent Airway: airway patency satisfactory and stable  Dentition: dentition unchanged  Vital Signs Stable: post-procedure vital signs reviewed and stable  Report to RN Given: handoff report given  Patient transferred to: PACU    Handoff Report: Identifed the Patient, Identified the Reponsible Provider, Reviewed the pertinent medical history, Discussed the surgical course, Reviewed Intra-OP anesthesia mangement and issues during anesthesia, Set expectations for post-procedure period and Allowed opportunity for questions and acknowledgement of understanding      Vitals:  Vitals Value Taken Time    78    Temp 98.3    Pulse 92 05/17/24 1957   Resp 18 05/17/24 1957   SpO2 95 % 05/17/24 1957   Vitals shown include unfiled device data.    Electronically Signed By: Eden Leach CRNA, APRN CRNA  May 17, 2024  7:58 PM

## 2024-05-18 NOTE — ANESTHESIA PROCEDURE NOTES
"Epidural catheter Procedure Note    Pre-Procedure   Staff -        Anesthesiologist:  Chiki Meredith MD       Resident/Fellow: Goldberg, Leslie, MD       Performed By: resident       Location: ICU       Procedure Start/Stop Times: 5/18/2024 11:12 AM and 5/18/2024 11:30 AM       Pre-Anesthestic Checklist: patient identified, IV checked, risks and benefits discussed, informed consent, monitors and equipment checked, pre-op evaluation, at physician/surgeon's request and post-op pain management  Timeout:       Correct Patient: Yes        Correct Procedure: Yes        Correct Site: Yes        Correct Position: Yes   Procedure Documentation  Procedure: epidural catheter       Diagnosis: Post-operative analgesia       Patient Position: sitting       Patient Prep/Sterile Barriers: sterile gloves, mask, patient draped       Skin prep: Chloraprep       Local skin infiltrated with 3 mL of 2% lidocaine.        Insertion Site: T8-9. (midline approach).       Technique: LORT saline        HALIMA at 7 cm.       Needle Type: Touhy needle       Needle Gauge: 17.        Needle Length (Inches): 3.5        Catheter: 18 G.          Catheter threaded easily.         4 cm epidural space.         Threaded 11 cm at skin.         # of attempts: 1 and  # of redirects:  1    Assessment/Narrative         Paresthesias: Resolved.       Test dose of 3 mL lidocaine 1.5% w/ 1:200,000 epinephrine at 11:24 CDT.         Test dose negative, 3 minutes after injection, for signs of intravascular, subdural, or intrathecal injection.       Insertion/Infusion Method: LORT saline       Aspiration negative for Heme or CSF via Epidural Catheter.    Medication(s) Administered   Medication Administration Time: 5/18/2024 11:12 AM      FOR [facility] ([facility]) ONLY:   Pain Team Contact information: please page the Pain Team Via [employer]. Search \"Pain\". During daytime hours, please page the attending first. At night please page the resident first.      "

## 2024-05-18 NOTE — PROGRESS NOTES
ICU End of Shift Summary. See flowsheets for vital signs and detailed assessment.    Changes this shift: alert and intermitently disoriented to time. Follows commands. C/o severe pain, not well managed with oxy, tylenol, and robaxin PRNs. Order for PRN dilaudid obtained; effective per pt. Atarax PRN x1.SR. VSS. 2L NC with capno monitoring. CT with appx 200mL output. CXR obtained. Zofran PRN x1. Insulin gtt initiated, now infusing @ algo 2. NPO. Davies in place with adequate output.     Plan: consider removing davies and art line. Continue plan of care. Manage pain.

## 2024-05-18 NOTE — PROGRESS NOTES
IP PT Evaluation:     05/18/24 1111   Appointment Info   Signing Clinician's Name / Credentials (PT) Arturo Cifuentes, PT, DPT   Living Environment   People in Home spouse   Current Living Arrangements house   Home Accessibility no concerns   Transportation Anticipated family or friend will provide;car, drives self   Living Environment Comments All needs met on one level   Self-Care   Equipment Currently Used at Home none   Fall history within last six months no   Activity/Exercise/Self-Care Comment IND with mobility and I/ADLs at baseline. Works as a .   General Information   Onset of Illness/Injury or Date of Surgery 05/17/24   Referring Physician Martha Lowe MD   Patient/Family Therapy Goals Statement (PT) Return home   Pertinent History of Current Problem (include personal factors and/or comorbidities that impact the POC) Per EMR: Brendan Helms is a 53 year old male POD#1 s/p Right posterolateral thoracotomy, Tracheobronchoplasty with prolene mesh, Intercostal nerve cryoablation.   Existing Precautions/Restrictions thoracotomy  (right)   Weight-Bearing Status - RUE other (see comments)  (thoracotomy)   General Observations Pt supine with spouse at bedside, agreeable to session.   Cognition   Affect/Mental Status (Cognition) WFL   Pain Assessment   Patient Currently in Pain   (R shoulder pain post op, RN aware)   Integumentary/Edema   Integumentary/Edema Comments R CT   Posture    Posture Forward head position   Range of Motion (ROM)   Range of Motion ROM is WFL   Strength (Manual Muscle Testing)   Strength (Manual Muscle Testing) strength is WFL   Strength Comments Global deconditioning   Bed Mobility   Comment, (Bed Mobility) Alexx supine>sit   Transfers   Comment, (Transfers) CGA sit<>stand   Gait/Stairs (Locomotion)   Comment, (Gait/Stairs) CGA, no AD   Balance   Balance Comments Slow, cautious with wide RANDEE initially in standing   Sensory Examination   Sensory Perception patient  reports no sensory changes   Coordination   Coordination no deficits were identified   Clinical Impression   Criteria for Skilled Therapeutic Intervention Yes, treatment indicated   PT Diagnosis (PT) Impaired functional mobility   Influenced by the following impairments Deconditioning, pain, precautions   Functional limitations due to impairments bed mobility, transfers, gait, balance, activity tolerance   Clinical Presentation (PT Evaluation Complexity) stable   Clinical Presentation Rationale Clinical judgment   Clinical Decision Making (Complexity) low complexity   Planned Therapy Interventions (PT) balance training;bed mobility training;gait training;home exercise program;patient/family education;stair training;strengthening;transfer training;progressive activity/exercise;home program guidelines;risk factor education   Risk & Benefits of therapy have been explained evaluation/treatment results reviewed;care plan/treatment goals reviewed;risks/benefits reviewed;current/potential barriers reviewed;participants voiced agreement with care plan;participants included;patient;spouse/significant other   PT Total Evaluation Time   PT Eval, Low Complexity Minutes ([id number]) 6   Physical Therapy Goals   PT Frequency Daily   PT Predicted Duration/Target Date for Goal Attainment 06/01/24   PT Goals Bed Mobility;Transfers;Gait   PT: Bed Mobility Independent;Supine to/from sit;Within precautions   PT: Transfers Independent;Sit to/from stand   PT: Gait Independent;Greater than 200 feet   PT Discharge Planning   PT Plan progress gait (no AD), dynamic balance   PT Discharge Recommendation (DC Rec) home with assist   PT Rationale for DC Rec Pt below baseline 2/2 pain and deconditioning, motivated to regain independence. Anticipate steady progress to enable discharge home with assist from spouse when medically ready.   PT Brief overview of current status SBA; ambulate halls as able   Total Session Time   Total Session Time (sum of  timed and untimed services) 6

## 2024-05-18 NOTE — OP NOTE
OPERATIVE REPORT    PREOPERATIVE DIAGNOSES:  1.  Tracheobronchomalacia and EDAC  2. Obesity    POSTOPERATIVE DIAGNOSES:  1.  Tracheobronchomalacia and EDAC  2. Obesity    OPERATION PERFORMED:    Flexible bronchoscopy   Right posterolateral thoracotomy, 4th ICS.   Tracheobronchoplasty with prolene mesh  Intercostal nerve cryoablation  Intercostal nerve block.     SURGEON:  Lisa Ochoa MD    ASSISTANT: Juancarlos Yost MD PhD    ANESTHESIA:  General endotracheal anesthesia.    ESTIMATED BLOOD LOSS:  200 mL.    COMPLICATIONS:  None.    DRAINS: 28 Fr straight argyle tube to right pleural space.     Implants: Prolene mesh for posterior tracheobronchoplasty.     SPECIMEN: None    DESCRIPTION OF OPERATION:  The patient was transported to the operating room and placed supine. General anesthesia was induced and a modified double lumen tube was placed and conformed with bronchoscopy. The patient had a transient episode of desaturation to the low 90's on 100% FiO2 which improved with clearing secretions and recruiting maneuvers. We continued with the operation given his good response. We prepped the right chest and draped in sterile fashion. We used a full Ioban.     A right posterolateral thoracotomy was done, we entered the 4th intercostal space. We divided the latissimus and preserved the serratus. We shingled the 4th and 5th ribs, and placed a stitch in the diaphragm for retraction. We had great exposure.     We opened the mediastinal pleura and divided the azygos with a stapler. We then spent a fair amount of time dissecting the posterior wall of the trachea and both main stem bronchi. Of note, this was a very challenging part of the operation given the extensive inflammatory changes in the posterior part of the trachea.     We took measurements of the tracheobronchial tree and tailored a prolene mesh to accomplish a 30% reduction in the diameter of the airway. Next, we started with the left main stem  bronchus. We performed intermittent endotracheal tube deflation and pull back while placing stitches in the left main stem bronchus. He tolerated that well. We tied the sutures while the tube was deflated and out of the LMSB.     Next we proceeded with the RMSB extending into the bronchus intermedius. For all the stitches we used 4-0 prolene. We placed a row on the cartilagenous portion of the airway and 2 rows in the membranous portion.     Lastly we performed a plasty on the trachea to the level of the thoracic inlet. The result was excellent, we confirmed patency of the RUL bronchus with bronchoscopy.     Right Pleural drain(s): We made a separate stab incision on the skin and placed a RIGHT pleural drain.     We confirmed hemostasis and closed in layers with absorbable suture.     The patient tolerated the procedure well, was extubated in the OR and transferred to PACU. All instruments and sponge counts were correct.     Lisa Ochoa MD

## 2024-05-18 NOTE — PLAN OF CARE
ICU End of Shift Summary. See flowsheets for vital signs and detailed assessment.    Changes this shift: Pt pain management improved today. Epidural catheter placed and hydromorphone PO and IV ordered added.    Plan:Monitor pain, VS and BG. Transfer upstairs when bed available.      Problem: Adult Inpatient Plan of Care  Goal: Plan of Care Review  Description: The Plan of Care Review/Shift note should be completed every shift.  The Outcome Evaluation is a brief statement about your assessment that the patient is improving, declining, or no change.  This information will be displayed automatically on your shift  note.  Outcome: Progressing  Flowsheets (Taken 5/18/2024 2471)  Outcome Evaluation: better pain control  Plan of Care Reviewed With: patient  Overall Patient Progress: improving

## 2024-05-18 NOTE — PROGRESS NOTES
Admitted/transferred from: PACU  Reason for admission/transfer: Monitoring  Patient status upon admission/transfer: Alert, confused. VSS on 2L NC.  Plan: monitor respiratory status.   2 RN skin assessment: completed by JOSHUA Aguilar. JOSHUA Mclaughlin.  Sexual Orientation and Gender Identity (SOGI) smartfom completed: Not done  Result of skin assessment and interventions/actions: See flowsheet  Height, weight, drug calc weight: Done  Patient belongings (see Flowsheet - Adult Profile for details): with pt  MDRO education (if applicable): N/A

## 2024-05-18 NOTE — OR NURSING
Dr. Lou paged back. Notified chest tube has new air leak- patient continues to hold oxygen saturation >95% on 2L oxymask. Notified of PACU interventions, see anesthesia record for precedex administration. Updated on needing orders for admit/transfer. Inquired if need chest xray in PACU

## 2024-05-18 NOTE — CONSULTS
"Pain Service Consultation Note  [facility]      Patient Name: Brendan Helms  MRN: [id number]   Age: 53 year old  Sex: male  Date: May 18, 2024                                      Reviewed: No    Referring Provider:  Adonis Avalos MD  Referring Service:  SICU  Reason for Consultation: Consideration of neuraxial block after thoracotomy     Assessment/Recommendations:  Brendan Helms is a 53 year old male with Type 1 Diabetes Mellitus, Hyperlipidemia, and tracheobronchomalacia POD1 from RIGHT posterolateral thoracotomy at the 4th rib, tracheobronchoplasty with prolene mesh, and intercostal nerve cryoablation.     Plan:   T8-9 epidural placed on 5/18 with bupivacaine 0.125% with 6 mcg/ml hydromorphone running at 8 ml/hour.     Thank you for the opportunity to participate in the care of Brendan Helms  Pain Service will continue to follow.    Discussed with attending anesthesiologist  Primary Service Contacted with Recommendations? Yes    Leslie Goldberg, MD  Anesthesia Resident, PGY4, [facility]  5/18/2024      Chief Complaint:  Chest pain after thoracotomy. \"I want to be able to breathe\"      History of Present Illness:  Brendan Helms is a 53 year old male who was admitted on 5/17/2024. He has a Past Medical History significant for Type 1 Diabetes Mellitus, Hyperlipidemia, and tracheobronchomalacia with excessive dynamic airway collapse of the posterior membrane (EDAC) s/p stent trial (04/2024), for which he underwent RIGHT posterolateral thoracotomy at the 4th rib, tracheobronchoplasty with prolene mesh, and intercostal nerve cryoablation on 5/17. He is POD 1 in the SICU.      Post-operative pain limiting mobility and deep inspiration.       Past Medical History:  Past Medical History:   Diagnosis Date    Diabetes, type I     HLD (hyperlipidemia)     Tracheobronchomalacia          Family History:    Family History   Problem Relation Age of Onset    Malignant Hyperthermia No family hx of  " "      Social History:  Social History     Tobacco Use    Smoking status: Never    Smokeless tobacco: Never   Substance Use Topics    Alcohol use: Not Currently     Comment: occasional         Tobacco:   denies  ETOH:  social  H/O Substance Abuse:  denies      Review of Systems:  Complete ROS reviewed. Unless otherwise noted, all other systems found to be negative.        Laboratory Results:  Recent Labs   Lab Test 05/18/24  0407      BUN 18.8       Allergies:  Allergies   Allergen Reactions    Amoxicillin-Pot Clavulanate Hives         Current Pain Related Medications:  Medications related to Pain Management (From now, onward)      Start     Dose/Rate Route Frequency Ordered Stop    05/20/24 0000  acetaminophen (TYLENOL) tablet 650 mg         650 mg Oral EVERY 4 HOURS PRN 05/17/24 2312      05/20/24 0000  bisacodyl (DULCOLAX) suppository 10 mg         10 mg Rectal DAILY PRN 05/17/24 2312 05/19/24 0000  magnesium hydroxide (MILK OF MAGNESIA) suspension 30 mL         30 mL Oral DAILY PRN 05/17/24 2312      05/18/24 1230  HYDROmorphone (DILAUDID) 6 mcg/mL, BUPivacaine (MARCAINE) 0.125 % in sodium chloride 0.9 % 250 mL EPIDURAL Infusion         8 mL/hr  EPIDURAL CONTINUOUS 05/18/24 1127 05/23/24 1229    05/18/24 1127  nalbuphine (NUBAIN) injection 2.5-5 mg         2.5-5 mg Intravenous EVERY 6 HOURS PRN 05/18/24 1127      05/18/24 0858  HYDROmorphone (DILAUDID) tablet 2 mg        Placed in \"Or\" Linked Group    2 mg Oral EVERY 4 HOURS PRN 05/18/24 0859      05/18/24 0858  HYDROmorphone (DILAUDID) tablet 4 mg        Placed in \"Or\" Linked Group    4 mg Oral EVERY 4 HOURS PRN 05/18/24 0859      05/18/24 0510  HYDROmorphone (PF) (DILAUDID) injection 0.5 mg         0.5 mg Intravenous EVERY 4 HOURS PRN 05/18/24 0510      05/17/24 2330  acetaminophen (TYLENOL) tablet 975 mg         975 mg Oral EVERY 8 HOURS 05/17/24 2312 05/20/24 2359    05/17/24 2330  senna-docusate (SENOKOT-S/PERICOLACE) 8.6-50 MG per tablet 1 " "tablet         1 tablet Oral 2 TIMES DAILY 05/17/24 2312 05/17/24 2312  hydrOXYzine HCl (ATARAX) tablet 25 mg         25 mg Oral EVERY 6 HOURS PRN 05/17/24 2312 05/17/24 2312  methocarbamol (ROBAXIN) tablet 750 mg         750 mg Oral EVERY 6 HOURS PRN 05/17/24 2312                Physical Exam:  Vitals: BP (!) 151/128   Pulse 85   Temp 36.8  C (98.2  F) (Axillary)   Resp 14   Ht 1.829 m (6')   Wt 112.1 kg (247 lb 2.2 oz)   SpO2 96%   BMI 33.52 kg/m      Physical Exam:   CONSTITUTIONAL/GENERAL APPEARANCE:  guarded moments  EYES: EOMI, sclera anicteric, PERRLA  ENT/NECK: atraumatic, lips and oral mucous membranes dry  RESPIRATORY: shallow breathing. No cough, wheeze. On NC  CV: No pressors  ABDOMEN: Soft, non-tender, non-distended  MUSCULOSKELETAL/BACK/SPINE/EXTREMITIES: Moves all extremities purposefully.  No gross edema or obvious joint deformities   NEURO: Alert and Oriented x3. Answers questions appropriately  SKIN/VASCULAR EXAM:  No jaundice, no visible rashes or lesions            Acute Inpatient Pain Service [facility]  Hours of pain coverage 24/7   Page via Amcom- Please Page the Pain Team Via Amcom: \"PAIN MANAGEMENT ACUTE INPATIENT/ [facility]\"           "

## 2024-05-18 NOTE — PROGRESS NOTES
THORACIC & FOREGUT SURGERY    S:  No acute overnight events.  Pt seen at bedside resting comfortably.   Does endorse borderline pain control.    O:  Vitals:    05/18/24 0300 05/18/24 0400 05/18/24 0500 05/18/24 0600   BP:       BP Location:       Pulse: 78 73 76 79   Resp: 16 13 15 16   Temp:  98.6  F (37  C)     TempSrc:  Oral     SpO2: 95% 95% 92% 94%   Weight:       Height:           A&Ox3, NAD  Breathing non-labored  RRR per tele  Soft, NDNT  Distal extremities warm    CT output thin serosang, no air leak    A/P: Brendan Helms is a 53 year old male POD#1 s/p Right posterolateral thoracotomy, Tracheobronchoplasty with prolene mesh, Intercostal nerve cryoablation. Doing well.    -Chest tube to water seal  -PO pain meds with IV backup  -RAPS consult to eval for block candidacy  -RDAT  -Continue insulin drip for now  -Lovenox DVT ppx (held this AM in case of block placement)    POSTOP COMPLICATIONS: None thus far     Glenn Madden PA-C

## 2024-05-18 NOTE — CARE PLAN
Occupational Therapy: Orders received. Chart reviewed and discussed with interdisciplinary care team.? Occupational Therapy not indicated due to pt's needs able to bet met by 1 discipline while here. PT has initiated and will follow for strengthening and endurance. Wife will be able to support heavy ADL/IADLs upon d/c.? Defer discharge recommendations to PT.? Will complete orders.

## 2024-05-18 NOTE — PROGRESS NOTES
SURGICAL ICU PROGRESS NOTE  05/18/2024        Date of Service (when I saw the patient): 05/18/2024    ASSESSMENT:  Brendan Helms is a 53 year old male who was admitted on 5/17/2024. He has a Past Medical History significant for Type 1 Diabetes Mellitus, Hyperlipidemia, and tracheobronchomalacia with excessive dynamic airway collapse of the posterior membrane (EDAC) s/p stent trial (04/2024), for which he underwent RIGHT posterolateral thoracotomy at the 4th rib, tracheobronchoplasty with prolene mesh, and intercostal nerve cryoablation. Post-operatively, he was admitted to the SICU for close airway monitoring.     CHANGES and MAJOR THINGS TODAY:   RAPS consult for evaluation of additional pain management options  Advance diet as tolerated  Remove Cleary  Remove Arterial Line  Transition off Insulin gtt to Sliding Scale/Home Regimen  NO Chemo ppx due to consideration of epidural  Transfer to floor      PLAN:    Neurological:  # Acute pain   - Monitor neurological status. Delirium preventions and precautions.   - Pain: tylenol 975 TID, prn hydroxyzine, prn robaxin, prn hydromorphone IV and PO  - RAPS consult     Pulmonary:   # Asthma  # Acute hypoxic respiratory support  # Tracheobronchomalacia s/p thoracotomy and tracheobronchoplasty  - 2L NC   - chest tube to suction per thoracic   - Scheduled albuterol nebs  - Supplemental oxygen to keep saturation above 92 %.  - Incentive spirometer every 15- 30 minutes.       Cardiovascular:    # Hyperlipidemia  - Monitor hemodynamic status.  - PTA Atorvastatin  - HOLD PTA ramipril      Gastroenterology/Nutrition:  - NPO overnight per thoracic surgery  - RD consult. Appreciate cares and recommendations.        Renal/Fluids/Electrolytes:   - Urine output is adequate  (0.63cc/kg/hr). Will continue to monitor intake and output.   - Limit IVFs       Endocrine:  #Type 1 Diabetes Mellitus  # Stress hyperglycemia   - Goal to keep BG< 180 for optimal wound healing   - Insulin gtt  OFF  - Transition to PTA Insulin regimen (Lantus (35u BID per patient))      ID:  no indications for antibiotics.      Positive cultures:  N/A       Heme:     - Hemoglobin 14.9  - Transfuse if hgb <7.0 or signs/symptoms of hypoperfusion. Monitor and trend.        Musculoskeletal/skin:  # Weakness and deconditioning of critical illness   - Physical and occupational therapy consult         General Cares/Prophylaxis:    DVT Prophylaxis: Pneumatic Compression Devices. NO Chemo ppx due to consideration of epidural (Anesthesia prefers SubCut Hep, not ordered yet)  GI Prophylaxis: Not indicated  Restraints: Restraints for medical healing needed: NO     Lines/ tubes/ drains:  - PIV x2  - RIGHT Radial Arterial Line  - Cleary  - RIGHT Chest tube     Disposition:  -  Transfer to Floor today    Patient seen, findings and plan discussed with surgical ICU staff, Dr. Avalos.    Time spent on this Encounter     NELLIE CULVER MD    ====================================  INTERVAL HISTORY:   No acute events overnight. Pain better controlled with hydromorphone - added PO hydromorphone PRN. Having significant pain throughout as well as discomfort with Cleary.    ROS No significant ROS except as reported above    OBJECTIVE:   1. VITAL SIGNS:   Temp:  [97.4  F (36.3  C)-98.6  F (37  C)] 98.6  F (37  C)  Pulse:  [] 79  Resp:  [13-18] 16  BP: (114-143)/(73-92) 120/85  MAP:  [66 mmHg-110 mmHg] 87 mmHg  Arterial Line BP: ()/(53-90) 128/67  SpO2:  [92 %-97 %] 94 %  Resp: 16      2. INTAKE/ OUTPUT:   I/O last 3 completed shifts:  In: 2284.83 [I.V.:2284.83]  Out: 1915 [Urine:1465; Blood:200; Chest Tube:250]    3. PHYSICAL EXAMINATION:  General: Resting in bed, not in acute distress  HEENT: Atraumatic  Neuro: A&Ox3, full strength throughout, pain limited movement  Pulm/Resp: Clear breath sounds bilaterally without rhonchi, crackles or wheeze, breathing non-labored  CV: RRR  Abdomen: Soft, non-distended, non-tender, no rebound tenderness or  guarding  : Cleary catheter in place, urine yellow and clear  Incisions/Skin: Dressing clean and without strikethrough  MSK/Extremities: Trace peripheral edema, moving all extremities, peripheral pulses intact, calves non-tender, extremities well perfused    4. INVESTIGATIONS:   Arterial Blood Gases   Recent Labs   Lab 05/17/24 1646 05/17/24 1530 05/17/24  1303 05/17/24  1137   PH 7.28* 7.26* 7.30* 7.32*   PCO2 59* 62* 56* 55*   PO2 92 85 125* 89   HCO3 28 28 27 29*     Complete Blood Count   Recent Labs   Lab 05/18/24  0407 05/17/24 1646 05/17/24  1530 05/17/24  1303   WBC 7.5  --   --   --    HGB 14.3 14.9 14.5 14.1     --   --   --      Basic Metabolic Panel  Recent Labs   Lab 05/18/24  0653 05/18/24  0608 05/18/24  0510 05/18/24  0409 05/18/24 0407 05/17/24 2336 05/17/24  2330 05/17/24 2006 05/17/24 1646 05/17/24  1530 05/17/24  1303   NA  --   --   --   --  135  --   --   --  138 139 140   POTASSIUM  --   --   --   --  4.4  --   --   --  5.2 4.8 4.1   CHLORIDE  --   --   --   --  101  --   --   --   --   --   --    CO2  --   --   --   --  23  --   --   --   --   --   --    BUN  --   --   --   --  18.8  --   --   --   --   --   --    CR  --   --   --   --  0.96  --  1.06  --   --   --   --    * 197* 230* 234* 233*   < >  --    < > 196* 158* 111*    < > = values in this interval not displayed.     Liver Function Tests  No lab results found in last 7 days.  Pancreatic Enzymes  No lab results found in last 7 days.  Coagulation Profile  No lab results found in last 7 days.      5. RADIOLOGY:   Recent Results (from the past 24 hour(s))   XR Chest Port 1 View    Impression    RESIDENT PRELIMINARY INTERPRETATION  Impression:   1. Right thoracotomy. Small right pneumothorax with chest tube in  place.  2. Postoperative right chest wall subcutaneous emphysema.  3. Question pneumomediastinum.       =========================================

## 2024-05-18 NOTE — BRIEF OP NOTE
"[facility]    Brief Operative Note    Pre-operative diagnosis: Tracheobronchomalacia [J39.8]  Post-operative diagnosis Same as pre-operative diagnosis    Procedure: Thoracotomy, Tracheobronchoplasty, Intercostal Nerve Cryoablation, Flexible Bronchoscopy, Right - Chest    Surgeon: Surgeons and Role:     * Lisa Gandara MD - Primary     * Cesario Yost MD - Resident - Assisting  Anesthesia: General   Estimated Blood Loss: 200 ml    Drains: Chest tube  Specimens:   ID Type Source Tests Collected by Time Destination   1 : Subcarinal Lymph Node Tissue Lymph Node(s) SURGICAL PATHOLOGY EXAM Lisa Gandara MD 5/17/2024 12:05 PM      Findings:   None.  Complications: None.  Implants:   Implant Name Type Inv. Item Serial No.  Lot No. LRB No. Used Action   MESH PROLENE 6X6\" [facility] - [id number] Mesh MESH PROLENE 6X6\" [facility]  [employer] [facility]- [id number] N/A 1 Implanted             "

## 2024-05-18 NOTE — ANESTHESIA POSTPROCEDURE EVALUATION
Patient: Brendan Helms    Procedure: Procedure(s):  Thoracotomy, Tracheobronchoplasty, Intercostal Nerve Cryoablation, Flexible Bronchoscopy       Anesthesia Type:  General    Note:  Disposition: Admission   Postop Pain Control: Uneventful            Sign Out: Well controlled pain   PONV: No   Neuro/Psych: Uneventful            Sign Out: Other neuro status   Airway/Respiratory: Uneventful            Sign Out: Acceptable/Baseline resp. status   CV/Hemodynamics: Uneventful            Sign Out: Acceptable CV status; No obvious hypovolemia; No obvious fluid overload   Other NRE: NONE   DID A NON-ROUTINE EVENT OCCUR? No    Event details/Postop Comments:  Patient presented with postoperative delirium in PACU. He was adequately treated with 30 mcg of dexmetetomidine.            Last vitals:  Vitals Value Taken Time   /73 05/17/24 2030   Temp 36.7  C (98  F) 05/17/24 2000   Pulse 76 05/17/24 2032   Resp 7 05/17/24 2032   SpO2 94 % 05/17/24 2032   Vitals shown include unfiled device data.    Electronically Signed By: Miguel A Wiggins MD  May 17, 2024  8:32 PM

## 2024-05-19 ENCOUNTER — APPOINTMENT (OUTPATIENT)
Dept: GENERAL RADIOLOGY | Facility: CLINIC | Age: 54
DRG: 164 | End: 2024-05-19
Payer: COMMERCIAL

## 2024-05-19 LAB
ANION GAP SERPL CALCULATED.3IONS-SCNC: 10 MMOL/L (ref 7–15)
BUN SERPL-MCNC: 29.9 MG/DL (ref 6–20)
CALCIUM SERPL-MCNC: 8.6 MG/DL (ref 8.6–10)
CHLORIDE SERPL-SCNC: 95 MMOL/L (ref 98–107)
CREAT SERPL-MCNC: 1.07 MG/DL (ref 0.67–1.17)
DEPRECATED HCO3 PLAS-SCNC: 26 MMOL/L (ref 22–29)
EGFRCR SERPLBLD CKD-EPI 2021: 83 ML/MIN/1.73M2
ERYTHROCYTE [DISTWIDTH] IN BLOOD BY AUTOMATED COUNT: 13.8 % (ref 10–15)
GLUCOSE BLDC GLUCOMTR-MCNC: 102 MG/DL (ref 70–99)
GLUCOSE BLDC GLUCOMTR-MCNC: 162 MG/DL (ref 70–99)
GLUCOSE BLDC GLUCOMTR-MCNC: 192 MG/DL (ref 70–99)
GLUCOSE BLDC GLUCOMTR-MCNC: 210 MG/DL (ref 70–99)
GLUCOSE BLDC GLUCOMTR-MCNC: 225 MG/DL (ref 70–99)
GLUCOSE BLDC GLUCOMTR-MCNC: 268 MG/DL (ref 70–99)
GLUCOSE BLDC GLUCOMTR-MCNC: 288 MG/DL (ref 70–99)
GLUCOSE BLDC GLUCOMTR-MCNC: 81 MG/DL (ref 70–99)
GLUCOSE BLDC GLUCOMTR-MCNC: 95 MG/DL (ref 70–99)
GLUCOSE SERPL-MCNC: 172 MG/DL (ref 70–99)
HCT VFR BLD AUTO: 39 % (ref 40–53)
HGB BLD-MCNC: 13.5 G/DL (ref 13.3–17.7)
MAGNESIUM SERPL-MCNC: 2.4 MG/DL (ref 1.7–2.3)
MCH RBC QN AUTO: 29.7 PG (ref 26.5–33)
MCHC RBC AUTO-ENTMCNC: 34.6 G/DL (ref 31.5–36.5)
MCV RBC AUTO: 86 FL (ref 78–100)
PHOSPHATE SERPL-MCNC: 4.3 MG/DL (ref 2.5–4.5)
PLATELET # BLD AUTO: 209 10E3/UL (ref 150–450)
POTASSIUM SERPL-SCNC: 4.6 MMOL/L (ref 3.4–5.3)
RBC # BLD AUTO: 4.54 10E6/UL (ref 4.4–5.9)
SODIUM SERPL-SCNC: 131 MMOL/L (ref 135–145)
WBC # BLD AUTO: 7.6 10E3/UL (ref 4–11)

## 2024-05-19 PROCEDURE — 250N000011 HC RX IP 250 OP 636

## 2024-05-19 PROCEDURE — 71045 X-RAY EXAM CHEST 1 VIEW: CPT | Mod: 26 | Performed by: RADIOLOGY

## 2024-05-19 PROCEDURE — 250N000011 HC RX IP 250 OP 636: Performed by: STUDENT IN AN ORGANIZED HEALTH CARE EDUCATION/TRAINING PROGRAM

## 2024-05-19 PROCEDURE — 01996 DLY HOSP MGMT EDRL RX ADMIN: CPT | Performed by: ANESTHESIOLOGY

## 2024-05-19 PROCEDURE — 84100 ASSAY OF PHOSPHORUS: CPT | Performed by: THORACIC SURGERY (CARDIOTHORACIC VASCULAR SURGERY)

## 2024-05-19 PROCEDURE — 120N000002 HC R&B MED SURG/OB UMMC

## 2024-05-19 PROCEDURE — 80048 BASIC METABOLIC PNL TOTAL CA: CPT

## 2024-05-19 PROCEDURE — 250N000013 HC RX MED GY IP 250 OP 250 PS 637

## 2024-05-19 PROCEDURE — 83735 ASSAY OF MAGNESIUM: CPT

## 2024-05-19 PROCEDURE — 85027 COMPLETE CBC AUTOMATED: CPT

## 2024-05-19 PROCEDURE — 36415 COLL VENOUS BLD VENIPUNCTURE: CPT

## 2024-05-19 PROCEDURE — 258N000003 HC RX IP 258 OP 636: Performed by: ANESTHESIOLOGY

## 2024-05-19 PROCEDURE — 999N000157 HC STATISTIC RCP TIME EA 10 MIN

## 2024-05-19 PROCEDURE — 94640 AIRWAY INHALATION TREATMENT: CPT

## 2024-05-19 PROCEDURE — 250N000009 HC RX 250

## 2024-05-19 PROCEDURE — 71045 X-RAY EXAM CHEST 1 VIEW: CPT

## 2024-05-19 PROCEDURE — 250N000011 HC RX IP 250 OP 636: Performed by: ANESTHESIOLOGY

## 2024-05-19 RX ADMIN — HYDROMORPHONE HYDROCHLORIDE 0.5 MG: 1 INJECTION, SOLUTION INTRAMUSCULAR; INTRAVENOUS; SUBCUTANEOUS at 01:01

## 2024-05-19 RX ADMIN — HEPARIN SODIUM 5000 UNITS: 5000 INJECTION, SOLUTION INTRAVENOUS; SUBCUTANEOUS at 16:18

## 2024-05-19 RX ADMIN — METHOCARBAMOL TABLETS 750 MG: 750 TABLET, COATED ORAL at 08:03

## 2024-05-19 RX ADMIN — INSULIN GLARGINE 35 UNITS: 100 INJECTION, SOLUTION SUBCUTANEOUS at 08:09

## 2024-05-19 RX ADMIN — HEPARIN SODIUM 5000 UNITS: 5000 INJECTION, SOLUTION INTRAVENOUS; SUBCUTANEOUS at 21:54

## 2024-05-19 RX ADMIN — HYDROMORPHONE HYDROCHLORIDE: 1 INJECTION, SOLUTION INTRAMUSCULAR; INTRAVENOUS; SUBCUTANEOUS at 14:08

## 2024-05-19 RX ADMIN — ACETAMINOPHEN 975 MG: 325 TABLET, FILM COATED ORAL at 08:03

## 2024-05-19 RX ADMIN — ALBUTEROL SULFATE 2.5 MG: 2.5 SOLUTION RESPIRATORY (INHALATION) at 21:07

## 2024-05-19 RX ADMIN — TAMSULOSIN HYDROCHLORIDE 0.4 MG: 0.4 CAPSULE ORAL at 08:03

## 2024-05-19 RX ADMIN — HEPARIN SODIUM 5000 UNITS: 5000 INJECTION, SOLUTION INTRAVENOUS; SUBCUTANEOUS at 06:04

## 2024-05-19 RX ADMIN — HYDROMORPHONE HYDROCHLORIDE 0.5 MG: 1 INJECTION, SOLUTION INTRAMUSCULAR; INTRAVENOUS; SUBCUTANEOUS at 06:26

## 2024-05-19 RX ADMIN — METHOCARBAMOL TABLETS 750 MG: 750 TABLET, COATED ORAL at 16:20

## 2024-05-19 RX ADMIN — ALBUTEROL SULFATE 2.5 MG: 2.5 SOLUTION RESPIRATORY (INHALATION) at 08:57

## 2024-05-19 RX ADMIN — DOCUSATE SODIUM 50 MG AND SENNOSIDES 8.6 MG 1 TABLET: 8.6; 5 TABLET, FILM COATED ORAL at 20:35

## 2024-05-19 RX ADMIN — ACETAMINOPHEN 975 MG: 325 TABLET, FILM COATED ORAL at 16:20

## 2024-05-19 RX ADMIN — ATORVASTATIN CALCIUM 40 MG: 40 TABLET, FILM COATED ORAL at 21:54

## 2024-05-19 RX ADMIN — LIDOCAINE 4% 1 PATCH: 40 PATCH TOPICAL at 20:33

## 2024-05-19 ASSESSMENT — ACTIVITIES OF DAILY LIVING (ADL)
ADLS_ACUITY_SCORE: 29

## 2024-05-19 NOTE — PROGRESS NOTES
ICU End of Shift Summary. See flowsheets for vital signs and detailed assessment.    Changes this shift: A&O x4. PRN dilaudid and robaxin utilized for pain. Bupivacaine/dilaudid epidural infusing. VSS. Requiring 2-4L NC. CT remains with appx 550 mL drainage. Pt expressing concern regarding mismanagement of insulin coverage. Carb coverage not ordered at this time, resulting in hyperglycemia up to 480s. Additional insulin administered per thoracic cross-cover order. See MAR and provider note for details. Appx 475 mL urine output overnight. AM labs processing    Plan: manage pain. Blood glucose management. Transfer to floor when  bed becomes available.

## 2024-05-19 NOTE — PROGRESS NOTES
Sur[facility] Cross Cover Note:    Switch to very high resistance sliding scale insulin around 8:30 PM due to high glucose 444. Give once novolog 10 units at 8:35 PM    08:41 PM: bedtime lantus 35 unit  10:32 PM: bedtime novolog 16 units  11:00 PM: glucose=474  11:30 PM: give once novolog 15 units  01:11 AM: Glucose start to decrease. ER=510. Give once novolog 5 unit      Plan to recheck glucose at 2 AM  Goal glucose 110-150. If glucose remain high, plan for insulin drip.    2. After davies remove today 11 AM 5/18/2024, patient void around 30 mL for 12 hours at 11 PM tonight. Patient state he felt fine, no urge for urine output. Patient refuse bladder scan. He had flomax at 8 AM this morning. Patient agree for bladder scan at 7:30 AM on 5/19/2024.    RN report at 1:30 AM: UOP around 275 mL + unmeasured voidx1. Bladder scan ~ 600 mL. Continue to monitor.     Bob Parkinson, PGY 1  General Surgery Resident  Pager [phone]

## 2024-05-19 NOTE — PROGRESS NOTES
Transferred to: Unit 7B  at 1700  Belongings: left with patient  Cleary removed? Yes  Central line removed? NA  Chart and medications sent with patient Yes  Family notified: Yes

## 2024-05-19 NOTE — PROGRESS NOTES
Pain Service Progress Note  [facility]  Date: 05/19/2024       Patient Name: Brendan Helms  MRN: [id number]  Age: 53 year old  Sex: male      Assessment:  Brendan Helms is a 53 year old male with tracheobronchomalacia.      Procedure: R posterolateral thoracotomy at the 4th rib, tracheobronchoplasty with prolene mesh, and intercostal nerve cryoablation.    Date of Surgery: 5/17/24    Date of Catheter Placement: 5/18/24    Plan/Recommendations:  1. Regional Anesthesia/Analgesia  -Continuous Catheter Type/Site: T8/9 epidural  Infusate: 0.125% bupivacaine with hydromorphone  Continuous Infusion at 10 mL/hr    Increase rate to 12 mL/hr    Plan to maintain catheter, max of 7 days    2. Anticoagulation  -Please contact Inpatient Pain Service before ordering or making any anticoagulation changes       3. Multimodal Analgesia  - Per primary service    Pain Service will continue to follow.    Discussed with attending anesthesiologist    hCiki Meredith MD  05/19/2024     Overnight Events: None    Subjective:  I am doing OK.   Nausea: No  Vomiting: No  Pruritus: No  Symptoms of LAST: No    Pain Location:  Chest    Pain Intensity:    Pain at Rest: 4/10   Pain with Activity: 6/10  Comfort Goal: 4/10   Satisfied with your level of pain control: Yes    Diet: Advance Diet as Tolerated: Regular Diet Adult    Relevant Labs:  Recent Labs   Lab Test 05/19/24  0642      BUN 29.9*       Physical Exam:  Vitals: /85 (Cuff Size: Adult Regular)   Pulse 98   Temp 98.3  F (36.8  C) (Oral)   Resp 16   Ht 1.829 m (6')   Wt 112.1 kg (247 lb 2.2 oz)   SpO2 98%   BMI 33.52 kg/m      Physical Exam:   Orientation:  Alert, oriented, and in no acute distress: Yes  Sedation: No    Motor Examination:  5/5 Strength in lower extremities: Yes    Catheter Site:   Catheter entry site is clean/dry/intact: Yes    Tender: No      Relevant Medications:  Current Pain Medications:  Medications related to Pain  "Management (From now, onward)      Start     Dose/Rate Route Frequency Ordered Stop    05/20/24 0000  acetaminophen (TYLENOL) tablet 650 mg         650 mg Oral EVERY 4 HOURS PRN 05/17/24 2312      05/20/24 0000  bisacodyl (DULCOLAX) suppository 10 mg         10 mg Rectal DAILY PRN 05/17/24 2312      05/19/24 0000  magnesium hydroxide (MILK OF MAGNESIA) suspension 30 mL         30 mL Oral DAILY PRN 05/17/24 2312      05/18/24 2000  Lidocaine (LIDOCARE) 4 % Patch 1 patch         1 patch  over 12 Hours Transdermal EVERY 24 HOURS 2000 05/18/24 1806 05/18/24 1230  HYDROmorphone (DILAUDID) 6 mcg/mL, BUPivacaine (MARCAINE) 0.125 % in sodium chloride 0.9 % 250 mL EPIDURAL Infusion         12 mL/hr  EPIDURAL CONTINUOUS 05/18/24 1127 05/23/24 1229    05/18/24 1127  nalbuphine (NUBAIN) injection 2.5-5 mg         2.5-5 mg Intravenous EVERY 6 HOURS PRN 05/18/24 1127      05/18/24 0858  HYDROmorphone (DILAUDID) tablet 2 mg        Placed in \"Or\" Linked Group    2 mg Oral EVERY 4 HOURS PRN 05/18/24 0859      05/18/24 0858  HYDROmorphone (DILAUDID) tablet 4 mg        Placed in \"Or\" Linked Group    4 mg Oral EVERY 4 HOURS PRN 05/18/24 0859      05/18/24 0510  HYDROmorphone (PF) (DILAUDID) injection 0.5 mg         0.5 mg Intravenous EVERY 4 HOURS PRN 05/18/24 0510      05/17/24 2330  acetaminophen (TYLENOL) tablet 975 mg         975 mg Oral EVERY 8 HOURS 05/17/24 2312 05/20/24 2359    05/17/24 2330  senna-docusate (SENOKOT-S/PERICOLACE) 8.6-50 MG per tablet 1 tablet         1 tablet Oral 2 TIMES DAILY 05/17/24 2312 05/17/24 2312  hydrOXYzine HCl (ATARAX) tablet 25 mg         25 mg Oral EVERY 6 HOURS PRN 05/17/24 2312      05/17/24 2312  methocarbamol (ROBAXIN) tablet 750 mg         750 mg Oral EVERY 6 HOURS PRN 05/17/24 2312              Primary Service Contacted with Recommendations? Yes      Please see A&P for additional details of medical decision making.      Acute Inpatient Pain Service [facility]  Hours of pain coverage 24/7 " "  Page via Amcom- Please Page the Pain Team Via Amcom: \"PAIN MANAGEMENT ACUTE INPATIENT/ [facility]\"                  "

## 2024-05-19 NOTE — PROGRESS NOTES
THORACIC & FOREGUT SURGERY    S:  Now floor status. Epidural catheter placed by anesthesia yesterday. High blood glucose overnight in 400s. Patient was switched to very high sliding scale and required multiple additional doses of insulin. Patient also appeared to have issues with urinary retention after Cleary removed but has now voided multiple times. Pt seen at bedside resting comfortably. Still endorse pain with breathing but somewhat improved from yesterday. 650 ml out from chest tube yesterday.    O:  Vitals:    05/19/24 0700 05/19/24 0800 05/19/24 0857 05/19/24 0900   BP:  136/85     BP Location:       Cuff Size:  Adult Regular     Pulse:       Resp:       Temp:  98.3  F (36.8  C)     TempSrc:  Oral     SpO2: 95% 97% 96% 98%   Weight:       Height:           A&Ox3, NAD  Breathing non-labored on nasal cannula  RRR per tele  Soft, NDNT  Distal extremities warm    CT output thin serosang, no air leak    A/P: Brendan Helms is a 53 year old male POD#2 s/p Right posterolateral thoracotomy, Tracheobronchoplasty with prolene mesh, Intercostal nerve cryoablation. Doing well.    -Chest tube to water seal, keep in place due to output  - Incentive spirometer  -PO pain meds with IV backup as well as epidural catheter.  -Consistent carbohydrate diet  -Very high sliding scale insulin and carb coverage insulin (1 unit/15 grams)  -Lovenox DVT ppx    POSTOP COMPLICATIONS: None thus far     Seen with Dr. Dorothea Horne MD  PGY1

## 2024-05-19 NOTE — PLAN OF CARE
Major Shift Events:  Assumed cares 1451-0858  Neuro: AxOx4. SBA. Pain well managed. Able to make needs known.  Cards: NSR. BP WDL. Afebrile. No edema.  Pulm: 4L NC; drops quickly without. Good dry cough. CT with dark red output.  GI/: Reg diet w/ sliding scale and carb coverage insulin. Minimal appetite. No BM. Only voided x1.  Plan: Transfer to floor   For vital signs and complete assessments, please see documentation flowsheets.

## 2024-05-20 ENCOUNTER — APPOINTMENT (OUTPATIENT)
Dept: PHYSICAL THERAPY | Facility: CLINIC | Age: 54
DRG: 164 | End: 2024-05-20
Attending: THORACIC SURGERY (CARDIOTHORACIC VASCULAR SURGERY)
Payer: COMMERCIAL

## 2024-05-20 ENCOUNTER — APPOINTMENT (OUTPATIENT)
Dept: GENERAL RADIOLOGY | Facility: CLINIC | Age: 54
DRG: 164 | End: 2024-05-20
Payer: COMMERCIAL

## 2024-05-20 ENCOUNTER — DOCUMENTATION ONLY (OUTPATIENT)
Dept: SURGERY | Facility: CLINIC | Age: 54
End: 2024-05-20

## 2024-05-20 LAB
ANION GAP SERPL CALCULATED.3IONS-SCNC: 10 MMOL/L (ref 7–15)
BUN SERPL-MCNC: 24.3 MG/DL (ref 6–20)
CALCIUM SERPL-MCNC: 8.5 MG/DL (ref 8.6–10)
CHLORIDE SERPL-SCNC: 94 MMOL/L (ref 98–107)
CREAT SERPL-MCNC: 0.92 MG/DL (ref 0.67–1.17)
DEPRECATED HCO3 PLAS-SCNC: 26 MMOL/L (ref 22–29)
EGFRCR SERPLBLD CKD-EPI 2021: >90 ML/MIN/1.73M2
ERYTHROCYTE [DISTWIDTH] IN BLOOD BY AUTOMATED COUNT: 13.6 % (ref 10–15)
GLUCOSE BLDC GLUCOMTR-MCNC: 112 MG/DL (ref 70–99)
GLUCOSE BLDC GLUCOMTR-MCNC: 246 MG/DL (ref 70–99)
GLUCOSE BLDC GLUCOMTR-MCNC: 78 MG/DL (ref 70–99)
GLUCOSE BLDC GLUCOMTR-MCNC: 95 MG/DL (ref 70–99)
GLUCOSE SERPL-MCNC: 164 MG/DL (ref 70–99)
HCT VFR BLD AUTO: 35.6 % (ref 40–53)
HGB BLD-MCNC: 12 G/DL (ref 13.3–17.7)
MAGNESIUM SERPL-MCNC: 2.2 MG/DL (ref 1.7–2.3)
MCH RBC QN AUTO: 29.2 PG (ref 26.5–33)
MCHC RBC AUTO-ENTMCNC: 33.7 G/DL (ref 31.5–36.5)
MCV RBC AUTO: 87 FL (ref 78–100)
PHOSPHATE SERPL-MCNC: 2.4 MG/DL (ref 2.5–4.5)
PLATELET # BLD AUTO: 216 10E3/UL (ref 150–450)
POTASSIUM SERPL-SCNC: 4.3 MMOL/L (ref 3.4–5.3)
RBC # BLD AUTO: 4.11 10E6/UL (ref 4.4–5.9)
SODIUM SERPL-SCNC: 130 MMOL/L (ref 135–145)
WBC # BLD AUTO: 7.4 10E3/UL (ref 4–11)

## 2024-05-20 PROCEDURE — 01996 DLY HOSP MGMT EDRL RX ADMIN: CPT | Mod: GC | Performed by: ANESTHESIOLOGY

## 2024-05-20 PROCEDURE — 250N000013 HC RX MED GY IP 250 OP 250 PS 637

## 2024-05-20 PROCEDURE — 31645 BRNCHSC W/THER ASPIR 1ST: CPT | Performed by: INTERNAL MEDICINE

## 2024-05-20 PROCEDURE — 85027 COMPLETE CBC AUTOMATED: CPT

## 2024-05-20 PROCEDURE — 94640 AIRWAY INHALATION TREATMENT: CPT | Mod: 76

## 2024-05-20 PROCEDURE — 120N000002 HC R&B MED SURG/OB UMMC

## 2024-05-20 PROCEDURE — 250N000011 HC RX IP 250 OP 636

## 2024-05-20 PROCEDURE — 31622 DX BRONCHOSCOPE/WASH: CPT | Performed by: INTERNAL MEDICINE

## 2024-05-20 PROCEDURE — 250N000011 HC RX IP 250 OP 636: Performed by: ANESTHESIOLOGY

## 2024-05-20 PROCEDURE — 250N000009 HC RX 250

## 2024-05-20 PROCEDURE — 0BC38ZZ EXTIRPATION OF MATTER FROM RIGHT MAIN BRONCHUS, VIA NATURAL OR ARTIFICIAL OPENING ENDOSCOPIC: ICD-10-PCS | Performed by: INTERNAL MEDICINE

## 2024-05-20 PROCEDURE — 71045 X-RAY EXAM CHEST 1 VIEW: CPT | Mod: 26 | Performed by: RADIOLOGY

## 2024-05-20 PROCEDURE — 250N000009 HC RX 250: Performed by: THORACIC SURGERY (CARDIOTHORACIC VASCULAR SURGERY)

## 2024-05-20 PROCEDURE — G0500 MOD SEDAT ENDO SERVICE >5YRS: HCPCS | Performed by: INTERNAL MEDICINE

## 2024-05-20 PROCEDURE — 36415 COLL VENOUS BLD VENIPUNCTURE: CPT

## 2024-05-20 PROCEDURE — 97530 THERAPEUTIC ACTIVITIES: CPT | Mod: GP

## 2024-05-20 PROCEDURE — 36415 COLL VENOUS BLD VENIPUNCTURE: CPT | Performed by: THORACIC SURGERY (CARDIOTHORACIC VASCULAR SURGERY)

## 2024-05-20 PROCEDURE — 94640 AIRWAY INHALATION TREATMENT: CPT

## 2024-05-20 PROCEDURE — 80048 BASIC METABOLIC PNL TOTAL CA: CPT

## 2024-05-20 PROCEDURE — 83735 ASSAY OF MAGNESIUM: CPT

## 2024-05-20 PROCEDURE — 999N000157 HC STATISTIC RCP TIME EA 10 MIN

## 2024-05-20 PROCEDURE — 84100 ASSAY OF PHOSPHORUS: CPT | Performed by: THORACIC SURGERY (CARDIOTHORACIC VASCULAR SURGERY)

## 2024-05-20 PROCEDURE — 71045 X-RAY EXAM CHEST 1 VIEW: CPT

## 2024-05-20 PROCEDURE — 250N000011 HC RX IP 250 OP 636: Performed by: STUDENT IN AN ORGANIZED HEALTH CARE EDUCATION/TRAINING PROGRAM

## 2024-05-20 PROCEDURE — 258N000003 HC RX IP 258 OP 636: Performed by: ANESTHESIOLOGY

## 2024-05-20 PROCEDURE — 250N000011 HC RX IP 250 OP 636: Performed by: THORACIC SURGERY (CARDIOTHORACIC VASCULAR SURGERY)

## 2024-05-20 PROCEDURE — 97116 GAIT TRAINING THERAPY: CPT | Mod: GP

## 2024-05-20 PROCEDURE — 0BC78ZZ EXTIRPATION OF MATTER FROM LEFT MAIN BRONCHUS, VIA NATURAL OR ARTIFICIAL OPENING ENDOSCOPIC: ICD-10-PCS | Performed by: INTERNAL MEDICINE

## 2024-05-20 PROCEDURE — 250N000013 HC RX MED GY IP 250 OP 250 PS 637: Performed by: THORACIC SURGERY (CARDIOTHORACIC VASCULAR SURGERY)

## 2024-05-20 RX ORDER — GUAIFENESIN 600 MG/1
600 TABLET, EXTENDED RELEASE ORAL 2 TIMES DAILY
Status: DISCONTINUED | OUTPATIENT
Start: 2024-05-20 | End: 2024-05-23 | Stop reason: HOSPADM

## 2024-05-20 RX ORDER — FENTANYL CITRATE 50 UG/ML
INJECTION, SOLUTION INTRAMUSCULAR; INTRAVENOUS PRN
Status: DISCONTINUED | OUTPATIENT
Start: 2024-05-20 | End: 2024-05-23

## 2024-05-20 RX ORDER — LIDOCAINE HYDROCHLORIDE 10 MG/ML
INJECTION, SOLUTION INFILTRATION; PERINEURAL PRN
Status: DISCONTINUED | OUTPATIENT
Start: 2024-05-20 | End: 2024-05-23 | Stop reason: HOSPADM

## 2024-05-20 RX ORDER — LIDOCAINE HYDROCHLORIDE 20 MG/ML
SOLUTION OROPHARYNGEAL PRN
Status: DISCONTINUED | OUTPATIENT
Start: 2024-05-20 | End: 2024-05-23 | Stop reason: HOSPADM

## 2024-05-20 RX ORDER — TRAZODONE HYDROCHLORIDE 50 MG/1
50 TABLET, FILM COATED ORAL ONCE
Status: COMPLETED | OUTPATIENT
Start: 2024-05-20 | End: 2024-05-20

## 2024-05-20 RX ORDER — LIDOCAINE HYDROCHLORIDE 40 MG/ML
INJECTION, SOLUTION RETROBULBAR PRN
Status: DISCONTINUED | OUTPATIENT
Start: 2024-05-20 | End: 2024-05-23 | Stop reason: HOSPADM

## 2024-05-20 RX ORDER — LIDOCAINE HYDROCHLORIDE 10 MG/ML
INJECTION, SOLUTION EPIDURAL; INFILTRATION; INTRACAUDAL; PERINEURAL
Status: COMPLETED
Start: 2024-05-20 | End: 2024-05-20

## 2024-05-20 RX ORDER — LANOLIN ALCOHOL/MO/W.PET/CERES
3 CREAM (GRAM) TOPICAL
Status: DISCONTINUED | OUTPATIENT
Start: 2024-05-20 | End: 2024-05-22

## 2024-05-20 RX ADMIN — ATORVASTATIN CALCIUM 40 MG: 40 TABLET, FILM COATED ORAL at 22:33

## 2024-05-20 RX ADMIN — GUAIFENESIN 600 MG: 600 TABLET ORAL at 08:35

## 2024-05-20 RX ADMIN — METHOCARBAMOL TABLETS 750 MG: 750 TABLET, COATED ORAL at 01:18

## 2024-05-20 RX ADMIN — POTASSIUM & SODIUM PHOSPHATES POWDER PACK 280-160-250 MG 1 PACKET: 280-160-250 PACK at 17:53

## 2024-05-20 RX ADMIN — ACETAMINOPHEN 650 MG: 325 TABLET, FILM COATED ORAL at 19:39

## 2024-05-20 RX ADMIN — ACETAMINOPHEN 975 MG: 325 TABLET, FILM COATED ORAL at 01:18

## 2024-05-20 RX ADMIN — TRAZODONE HYDROCHLORIDE 50 MG: 50 TABLET ORAL at 23:35

## 2024-05-20 RX ADMIN — INSULIN GLARGINE 35 UNITS: 100 INJECTION, SOLUTION SUBCUTANEOUS at 08:36

## 2024-05-20 RX ADMIN — HYDROMORPHONE HYDROCHLORIDE 0.5 MG: 1 INJECTION, SOLUTION INTRAMUSCULAR; INTRAVENOUS; SUBCUTANEOUS at 05:51

## 2024-05-20 RX ADMIN — MAGNESIUM HYDROXIDE 30 ML: 400 SUSPENSION ORAL at 19:53

## 2024-05-20 RX ADMIN — LIDOCAINE HYDROCHLORIDE: 10 INJECTION, SOLUTION EPIDURAL; INFILTRATION; INTRACAUDAL; PERINEURAL at 10:02

## 2024-05-20 RX ADMIN — HYDROMORPHONE HYDROCHLORIDE: 1 INJECTION, SOLUTION INTRAMUSCULAR; INTRAVENOUS; SUBCUTANEOUS at 12:41

## 2024-05-20 RX ADMIN — ALBUTEROL SULFATE 2.5 MG: 2.5 SOLUTION RESPIRATORY (INHALATION) at 01:34

## 2024-05-20 RX ADMIN — DOCUSATE SODIUM 50 MG AND SENNOSIDES 8.6 MG 1 TABLET: 8.6; 5 TABLET, FILM COATED ORAL at 19:40

## 2024-05-20 RX ADMIN — LIDOCAINE 4% 1 PATCH: 40 PATCH TOPICAL at 19:40

## 2024-05-20 RX ADMIN — HYDROMORPHONE HYDROCHLORIDE 0.5 MG: 1 INJECTION, SOLUTION INTRAMUSCULAR; INTRAVENOUS; SUBCUTANEOUS at 01:01

## 2024-05-20 RX ADMIN — HYDROMORPHONE HYDROCHLORIDE 2 MG: 2 TABLET ORAL at 11:43

## 2024-05-20 RX ADMIN — GUAIFENESIN 600 MG: 600 TABLET ORAL at 19:40

## 2024-05-20 RX ADMIN — HEPARIN SODIUM 5000 UNITS: 5000 INJECTION, SOLUTION INTRAVENOUS; SUBCUTANEOUS at 05:51

## 2024-05-20 RX ADMIN — ALBUTEROL SULFATE 2.5 MG: 2.5 SOLUTION RESPIRATORY (INHALATION) at 20:40

## 2024-05-20 RX ADMIN — HEPARIN SODIUM 5000 UNITS: 5000 INJECTION, SOLUTION INTRAVENOUS; SUBCUTANEOUS at 19:53

## 2024-05-20 RX ADMIN — METHOCARBAMOL TABLETS 750 MG: 750 TABLET, COATED ORAL at 19:39

## 2024-05-20 RX ADMIN — TAMSULOSIN HYDROCHLORIDE 0.4 MG: 0.4 CAPSULE ORAL at 08:35

## 2024-05-20 RX ADMIN — POTASSIUM & SODIUM PHOSPHATES POWDER PACK 280-160-250 MG 1 PACKET: 280-160-250 PACK at 20:09

## 2024-05-20 RX ADMIN — HYDROMORPHONE HYDROCHLORIDE 0.5 MG: 1 INJECTION, SOLUTION INTRAMUSCULAR; INTRAVENOUS; SUBCUTANEOUS at 18:44

## 2024-05-20 RX ADMIN — ACETAMINOPHEN 975 MG: 325 TABLET, FILM COATED ORAL at 08:40

## 2024-05-20 RX ADMIN — DOCUSATE SODIUM 50 MG AND SENNOSIDES 8.6 MG 1 TABLET: 8.6; 5 TABLET, FILM COATED ORAL at 08:35

## 2024-05-20 RX ADMIN — Medication 3 MG: at 23:35

## 2024-05-20 RX ADMIN — ALBUTEROL SULFATE 2.5 MG: 2.5 SOLUTION RESPIRATORY (INHALATION) at 08:21

## 2024-05-20 ASSESSMENT — ACTIVITIES OF DAILY LIVING (ADL)
ADLS_ACUITY_SCORE: 27
ADLS_ACUITY_SCORE: 27
ADLS_ACUITY_SCORE: 28
ADLS_ACUITY_SCORE: 27
ADLS_ACUITY_SCORE: 29
ADLS_ACUITY_SCORE: 27
ADLS_ACUITY_SCORE: 29
ADLS_ACUITY_SCORE: 28
ADLS_ACUITY_SCORE: 29
ADLS_ACUITY_SCORE: 29
ADLS_ACUITY_SCORE: 27
ADLS_ACUITY_SCORE: 27
ADLS_ACUITY_SCORE: 28
ADLS_ACUITY_SCORE: 27
ADLS_ACUITY_SCORE: 28
ADLS_ACUITY_SCORE: 29
ADLS_ACUITY_SCORE: 27
ADLS_ACUITY_SCORE: 29
ADLS_ACUITY_SCORE: 29
ADLS_ACUITY_SCORE: 28
ADLS_ACUITY_SCORE: 27

## 2024-05-20 NOTE — PROGRESS NOTES
THORACIC & FOREGUT SURGERY    S:  Doing well, notes somewhat nasal voice, expected for his procedure.  No acute concerns.    O:  Vitals:    05/20/24 0400 05/20/24 0551 05/20/24 0803 05/20/24 0821   BP:   133/80    BP Location:   Left arm    Cuff Size:       Pulse:   86    Resp:  20 20    Temp:   98.3  F (36.8  C)    TempSrc:   Oral    SpO2: 96% 97% 98% 98%   Weight:       Height:           A&Ox3, NAD  Breathing non-labored on nasal cannula  RRR per tele  Soft, NDNT  Distal extremities warm    CT output thin serosang, no air leak    A/P: Brendan Helms is a 53 year old male POD#3 s/p Right posterolateral thoracotomy, Tracheobronchoplasty with prolene mesh, Intercostal nerve cryoablation. Doing well.    -Bronchoscopy this PM for secretion clearance   -Chest tube to water seal, keep in place due to high volume fluid output  - Incentive spirometer  -PO pain meds with IV backup as well as epidural catheter.  -Consistent carbohydrate diet  -Very high sliding scale insulin and carb coverage insulin (1 unit/15 grams)  -Lovenox DVT ppx    POSTOP COMPLICATIONS: None thus far     Seen with Dr. Dorothea Madden, PAAppleC

## 2024-05-20 NOTE — PROVIDER NOTIFICATION
7B 240   Brendan Helms  FYI patient prefers to use his home insulin needles as our floor stock need length is too short in comparison to his. Also states he reuses needles for injection. please advice  Herlinda [phone]   Paged Mary Max MD.    Responds: advice to have patient use floor stock needles for injection, more follow up with endocrine for resources in the morning.     Time 2030  7B 240   Brendan Helms  pt is reluctant to using floor stock needle stating we should stay out of his insulin coverage and he is only here for SOB. states he uses needles >6 occurrences. Please see pt.  Herlinda [phone]

## 2024-05-20 NOTE — PHARMACY-ADMISSION MEDICATION HISTORY
Pharmacist Admission Medication History    Admission medication history is complete. The information provided in this note is only as accurate as the sources available at the time of the update.    Information Source(s): Patient and Clinic records via phone    Pertinent Information:   - No SureScripts refill information available at time of medication history  - Confirmed doses of atorvastatin and ramipril with [facility]  - Patient unsure if he is taking ramipril, last filled 3/8/2024 for 90 days supply per [facility]  - Timing of last doses documented by nursing staff   - Tamsulosin: patient states this was started by Dr. Dumont prior to procedure for catheter placement    Changes made to PTA medication list:  Added: None  Deleted: None  Changed:   Lantus: 35U QAM and 40U QPM > 35U BID (per patient)    Allergies reviewed with patient and updates made in EHR: yes    Medication History Completed By:   Teresa Benito, PharmD, BCPS 5/20/2024 8:42 AM    PTA Med List   Medication Sig Last Dose    albuterol (PROAIR HFA/PROVENTIL HFA/VENTOLIN HFA) 108 (90 Base) MCG/ACT inhaler Inhale 2 puffs into the lungs every 4 hours as needed 5/17/2024    atorvastatin (LIPITOR) 40 MG tablet Take 1 tablet by mouth At Bedtime Past Week    blood glucose (CONTOUR TEST) test strip Test 6 times daily and as needed. Past Month    Glucagon (BAQSIMI ONE PACK) 3 MG/DOSE POWD  More than a month    insulin aspart (NOVOLOG PEN) 100 UNIT/ML pen Inject Subcutaneous 4 times daily (with meals and nightly) Sliding scale and carb counting 5/17/2024 at 0730    insulin glargine (LANTUS PEN) 100 UNIT/ML pen Inject 35 Units Subcutaneous 2 times daily 35 units in AM and 35 in PM 5/17/2024 at 0500    ramipril (ALTACE) 5 MG capsule Take 1 capsule by mouth at bedtime Past Week    sildenafil (REVATIO) 20 MG tablet Take 40-60 mg by mouth daily as needed More than a month    tamsulosin (FLOMAX) 0.4 MG capsule Take 0.4 mg by mouth daily  5/16/2024 at 2200

## 2024-05-20 NOTE — PLAN OF CARE
Goal Outcome Evaluation:      Plan of Care Reviewed With: patient    Overall Patient Progress: improving    Shift [id number]  A&Ox4, makes needs known. VSS on 4L NC. R CT to WS, without air leak, dressing changed. LS clear, diminished at bases, frequent cough, on scheduled nebs. Pt reports increased pain when coughing rates pain 7-10/10, pain managed with prn IV dilaudid x2, prn robaxin x1, tylenol x1 & epidural infusing at 12 ml/hr with adequate relief per pt. Left PIV SL. Up to BSC. Voiding spontaneously, no BM. Consistent carb diet. Overnight BG 95. No acute changes overnight. Continue POC.

## 2024-05-20 NOTE — PROGRESS NOTES
Time of care: 3926-0302    Admitted/transferred from:   2 RN full   skin assessment completed by Herlinda Taylor, RN and Le Wesley RN.  Skin assessment finding: issues found Chest tube site, transverse incision to right upper flank, epidural cath site, generalized abdominal bruising. R/L PIV site    Interventions/actions: skin interventions continue to monitor incision site for S/S of infection and excessive drainage.       Bedside Emergency Equipment Present:  Suction Regulator: Yes  Suction Canister: Yes  Tubing between Regulator and Canister: Yes  O2 Regulator with Tree: Yes  Ambu Bag: Yes       /79 (BP Location: Right arm)   Pulse 99   Temp 98.8  F (37.1  C) (Oral)   Resp 16   Ht 1.829 m (6')   Wt 102 kg (224 lb 12.8 oz)   SpO2 98%   BMI 30.49 kg/m     Patient is A/Ox4 on 4L NC with right chest tube to water seal without air leak. Up ad deedee in room. Last BG check 210 insulin coverage per sliding scale. Epidural cath site dressing CDI, pump infusing at 12ml/hr. MD informed of use and reuse of home insulin needles. Educated on the importance of one time use of needles so as to prevent infections, patient states he has been doing this for a long time now. PIV intact. Patient is asked how many times to needle re-usage occur, patient states >6 or more occurrences depending on if cap is not touched, MD informed of this as well. NOC nurse to follow up.

## 2024-05-20 NOTE — PROCEDURES
Pre-procedure diagnosis: Mucus plug status post tracheobronchoplasty    Posto-procedure diagnosis: Same    Indication: Postop mucus plug and inability to clear secretions.     Sedation: 2 mg Versed, 50 mcg Fentanyl    Procedure: Flexible bronchoscopy with therapeutic aspiration of mucus.     Description:   Patient identified and positioned supine. A timeout was performed. Sedation was administered and the bronchoscope was inserted. The cords were visualized and apposed appropriately. Lidocaine was sprayed over the cords and they were traversed. The humaira was visualized and both mainstem entered sequentially. There were thick secretions in the entire airway up to the subsegmental level. Secretions were aspirated. There was also evidence of airway edema. The scope was withdrawn. There were no complications.     I was present for the entire procedure including scope insertion and withdrawal.    Rod Desai MD  5/20/24

## 2024-05-20 NOTE — PROGRESS NOTES
"Pain Service Progress Note  [facility]  Date: 05/20/2024       Patient Name: Brendan Helms  MRN: [id number]  Age: 53 year old  Sex: male      Assessment:  Brendan Helms is a 53 year old male with tracheobronchomalacia.      Procedure: R posterolateral thoracotomy at the 4th rib, tracheobronchoplasty with prolene mesh, and intercostal nerve cryoablation.    Date of Surgery: 5/17/24    Date of Catheter Placement: 5/18/24    Plan/Recommendations:  1. Regional Anesthesia/Analgesia  -Continuous Catheter Type/Site: T8/9 epidural  Infusate: 0.125% bupivacaine with hydromorphone  Continuous Infusion at 12 mL/hr    Plan to maintain catheter, max of 7 days    2. Anticoagulation  -Please contact Inpatient Pain Service before ordering or making any anticoagulation changes       3. Multimodal Analgesia  - Per primary service    Pain Service will continue to follow.    Discussed with attending anesthesiologist    Shagufta Healy DO, PGY-2/CA-1  Anesthesia Resident   Inpatient Pain Service   Contact via [employer]       Overnight Events: NAEO.     Subjective:  Doing okay\" noting better than yesterday. States pain with deep breathing 4/10 and at rest otherwise its very minimal 0.5-1/10. Sitting up in bed.   Nausea: No  Vomiting: No  Pruritus: No  Symptoms of LAST: No    Pain Location:  Chest    Pain Intensity:    Pain at Rest: 4/10   Pain with Activity: 6/10  Comfort Goal: 4/10   Satisfied with your level of pain control: Yes    Diet: Consistent Carbohydrate Diet Moderate Consistent Carb (60 g CHO per Meal) Diet    Relevant Labs:  Recent Labs   Lab Test 05/20/24  0550      BUN 24.3*       Physical Exam:  Vitals: /80 (BP Location: Left arm)   Pulse 86   Temp 98.3  F (36.8  C) (Oral)   Resp 20   Ht 1.829 m (6')   Wt 102 kg (224 lb 12.8 oz)   SpO2 98%   BMI 30.49 kg/m      Physical Exam:   Orientation:  Alert, oriented, and in no acute distress: Yes  Sedation: No    Motor " "Examination:  5/5 Strength in lower extremities: Yes    Catheter Site:   Catheter entry site is clean/dry/intact: Yes    Tender: No      Relevant Medications:    Current Pain Medications:  Medications related to Pain Management (From now, onward)      Start     Dose/Rate Route Frequency Ordered Stop    05/20/24 0000  acetaminophen (TYLENOL) tablet 650 mg         650 mg Oral EVERY 4 HOURS PRN 05/17/24 2312      05/20/24 0000  bisacodyl (DULCOLAX) suppository 10 mg         10 mg Rectal DAILY PRN 05/17/24 2312      05/19/24 0000  magnesium hydroxide (MILK OF MAGNESIA) suspension 30 mL         30 mL Oral DAILY PRN 05/17/24 2312      05/18/24 2000  Lidocaine (LIDOCARE) 4 % Patch 1 patch         1 patch  over 12 Hours Transdermal EVERY 24 HOURS 2000 05/18/24 1806 05/18/24 1230  HYDROmorphone (DILAUDID) 6 mcg/mL, BUPivacaine (MARCAINE) 0.125 % in sodium chloride 0.9 % 250 mL EPIDURAL Infusion         12 mL/hr  EPIDURAL CONTINUOUS 05/18/24 1127 05/23/24 1229    05/18/24 1127  nalbuphine (NUBAIN) injection 2.5-5 mg         2.5-5 mg Intravenous EVERY 6 HOURS PRN 05/18/24 1127      05/18/24 0858  HYDROmorphone (DILAUDID) tablet 2 mg        Placed in \"Or\" Linked Group    2 mg Oral EVERY 4 HOURS PRN 05/18/24 0859      05/18/24 0858  HYDROmorphone (DILAUDID) tablet 4 mg        Placed in \"Or\" Linked Group    4 mg Oral EVERY 4 HOURS PRN 05/18/24 0859      05/18/24 0510  HYDROmorphone (PF) (DILAUDID) injection 0.5 mg         0.5 mg Intravenous EVERY 4 HOURS PRN 05/18/24 0510      05/17/24 2330  acetaminophen (TYLENOL) tablet 975 mg         975 mg Oral EVERY 8 HOURS 05/17/24 2312 05/20/24 1759    05/17/24 2330  senna-docusate (SENOKOT-S/PERICOLACE) 8.6-50 MG per tablet 1 tablet         1 tablet Oral 2 TIMES DAILY 05/17/24 2312 05/17/24 2312  hydrOXYzine HCl (ATARAX) tablet 25 mg         25 mg Oral EVERY 6 HOURS PRN 05/17/24 2312 05/17/24 2312  methocarbamol (ROBAXIN) tablet 750 mg         750 mg Oral EVERY 6 HOURS PRN " "05/17/24 2692                  Primary Service Contacted with Recommendations? Yes      Please see A&P for additional details of medical decision making.      Acute Inpatient Pain Service [facility]  Hours of pain coverage 24/7   Page via Amcom- Please Page the Pain Team Via [facility]: \"PAIN MANAGEMENT ACUTE INPATIENT/ [facility]\"         "

## 2024-05-20 NOTE — PLAN OF CARE
Goal Outcome Evaluation:  3587-2146 Shift                    Patient is A/Ox4 on 4L NC, VSS,  right chest tube to water seal without air leak 90 ml out.  Up ad deedee in room. Last BG check 81at HS ate peanut butter on toast with apple juice. Epidural cath site dressing CDI, pump infusing at 12ml/hr. . PIV intact.  MD talked to patient about reusing his personal insulin needles.  NOC nurse to follow up.     Patient told this writer he will agree to use his personal long needles for insulin only one time then staff will disposed of that needle in sharps container.

## 2024-05-20 NOTE — OR NURSING
Bronchoscopy with therapeutic suctioning performed under moderate sedation, patient tolerated well. Transported back to  and report called to RN.

## 2024-05-20 NOTE — PROGRESS NOTES
[place] paperwork completed and faxed to [facility] [employer] Department Attention Samy Larsen. Faxed to [phone].   Copy of paperwork given to ESTELLE Vicente to hand deliver to patient per patient request.    Linda Sanchez RN, BSN  Thoracic Surgery RN Care Coordinator

## 2024-05-20 NOTE — PLAN OF CARE
Goal Outcome Evaluation:    Plan of Care Reviewed With: patient  Overall Patient Progress: no changeOverall Patient Progress: no change  BP (!) 175/162   Pulse 106   Temp 98.3  F (36.8  C) (Oral)   Resp 19   Ht 1.829 m (6')   Wt 102 kg (224 lb 12.8 oz)   SpO2 97%   BMI 30.49 kg/m     AVSS, on 4L of oxygen via NC.  Intermittent cough, voice nasal.   CT to water seal. No air leak  S/p bronchoscopy with therapeutic suctioning under moderate sedation.  Had 2 mg of versed and 50 of Fent.  Fully awake. Epidural at 12cc/hr. Had 2 mg of po hydromorphone when pump was off.   Per endoscopy pt need bronchoscopy again tomorrow.   Continue with current POC.

## 2024-05-21 ENCOUNTER — APPOINTMENT (OUTPATIENT)
Dept: GENERAL RADIOLOGY | Facility: CLINIC | Age: 54
DRG: 164 | End: 2024-05-21
Payer: COMMERCIAL

## 2024-05-21 ENCOUNTER — APPOINTMENT (OUTPATIENT)
Dept: GENERAL RADIOLOGY | Facility: CLINIC | Age: 54
DRG: 164 | End: 2024-05-21
Attending: PHYSICIAN ASSISTANT
Payer: COMMERCIAL

## 2024-05-21 ENCOUNTER — APPOINTMENT (OUTPATIENT)
Dept: PHYSICAL THERAPY | Facility: CLINIC | Age: 54
DRG: 164 | End: 2024-05-21
Attending: THORACIC SURGERY (CARDIOTHORACIC VASCULAR SURGERY)
Payer: COMMERCIAL

## 2024-05-21 LAB
ANION GAP SERPL CALCULATED.3IONS-SCNC: 9 MMOL/L (ref 7–15)
BUN SERPL-MCNC: 12.2 MG/DL (ref 6–20)
CALCIUM SERPL-MCNC: 8.4 MG/DL (ref 8.6–10)
CHLORIDE SERPL-SCNC: 93 MMOL/L (ref 98–107)
CREAT SERPL-MCNC: 0.8 MG/DL (ref 0.67–1.17)
DEPRECATED HCO3 PLAS-SCNC: 29 MMOL/L (ref 22–29)
EGFRCR SERPLBLD CKD-EPI 2021: >90 ML/MIN/1.73M2
ERYTHROCYTE [DISTWIDTH] IN BLOOD BY AUTOMATED COUNT: 13.5 % (ref 10–15)
GLUCOSE BLDC GLUCOMTR-MCNC: 109 MG/DL (ref 70–99)
GLUCOSE BLDC GLUCOMTR-MCNC: 236 MG/DL (ref 70–99)
GLUCOSE BLDC GLUCOMTR-MCNC: 78 MG/DL (ref 70–99)
GLUCOSE BLDC GLUCOMTR-MCNC: 85 MG/DL (ref 70–99)
GLUCOSE SERPL-MCNC: 203 MG/DL (ref 70–99)
HCT VFR BLD AUTO: 34.4 % (ref 40–53)
HGB BLD-MCNC: 12 G/DL (ref 13.3–17.7)
MAGNESIUM SERPL-MCNC: 2.3 MG/DL (ref 1.7–2.3)
MCH RBC QN AUTO: 30.1 PG (ref 26.5–33)
MCHC RBC AUTO-ENTMCNC: 34.9 G/DL (ref 31.5–36.5)
MCV RBC AUTO: 86 FL (ref 78–100)
PHOSPHATE SERPL-MCNC: 2.8 MG/DL (ref 2.5–4.5)
PLATELET # BLD AUTO: 233 10E3/UL (ref 150–450)
POTASSIUM SERPL-SCNC: 4.4 MMOL/L (ref 3.4–5.3)
RBC # BLD AUTO: 3.99 10E6/UL (ref 4.4–5.9)
SODIUM SERPL-SCNC: 131 MMOL/L (ref 135–145)
WBC # BLD AUTO: 5.9 10E3/UL (ref 4–11)

## 2024-05-21 PROCEDURE — 999N000157 HC STATISTIC RCP TIME EA 10 MIN

## 2024-05-21 PROCEDURE — 71045 X-RAY EXAM CHEST 1 VIEW: CPT | Mod: 77

## 2024-05-21 PROCEDURE — 250N000011 HC RX IP 250 OP 636

## 2024-05-21 PROCEDURE — 71045 X-RAY EXAM CHEST 1 VIEW: CPT | Mod: 26 | Performed by: RADIOLOGY

## 2024-05-21 PROCEDURE — 85027 COMPLETE CBC AUTOMATED: CPT

## 2024-05-21 PROCEDURE — 120N000002 HC R&B MED SURG/OB UMMC

## 2024-05-21 PROCEDURE — 250N000013 HC RX MED GY IP 250 OP 250 PS 637

## 2024-05-21 PROCEDURE — 94640 AIRWAY INHALATION TREATMENT: CPT

## 2024-05-21 PROCEDURE — 71045 X-RAY EXAM CHEST 1 VIEW: CPT

## 2024-05-21 PROCEDURE — 250N000013 HC RX MED GY IP 250 OP 250 PS 637: Performed by: PHYSICIAN ASSISTANT

## 2024-05-21 PROCEDURE — 250N000011 HC RX IP 250 OP 636: Performed by: STUDENT IN AN ORGANIZED HEALTH CARE EDUCATION/TRAINING PROGRAM

## 2024-05-21 PROCEDURE — 250N000009 HC RX 250

## 2024-05-21 PROCEDURE — 84295 ASSAY OF SERUM SODIUM: CPT

## 2024-05-21 PROCEDURE — 84100 ASSAY OF PHOSPHORUS: CPT | Performed by: THORACIC SURGERY (CARDIOTHORACIC VASCULAR SURGERY)

## 2024-05-21 PROCEDURE — 36415 COLL VENOUS BLD VENIPUNCTURE: CPT

## 2024-05-21 PROCEDURE — 83735 ASSAY OF MAGNESIUM: CPT

## 2024-05-21 PROCEDURE — 97530 THERAPEUTIC ACTIVITIES: CPT | Mod: GP

## 2024-05-21 PROCEDURE — 94640 AIRWAY INHALATION TREATMENT: CPT | Mod: 76

## 2024-05-21 PROCEDURE — 01996 DLY HOSP MGMT EDRL RX ADMIN: CPT | Mod: GC | Performed by: ANESTHESIOLOGY

## 2024-05-21 RX ORDER — FUROSEMIDE 20 MG
20 TABLET ORAL DAILY
Status: DISCONTINUED | OUTPATIENT
Start: 2024-05-21 | End: 2024-05-23 | Stop reason: HOSPADM

## 2024-05-21 RX ORDER — HYDROXYZINE HYDROCHLORIDE 25 MG/1
25 TABLET, FILM COATED ORAL
Status: DISCONTINUED | OUTPATIENT
Start: 2024-05-21 | End: 2024-05-23

## 2024-05-21 RX ORDER — GABAPENTIN 100 MG/1
200 CAPSULE ORAL 2 TIMES DAILY
Status: DISCONTINUED | OUTPATIENT
Start: 2024-05-21 | End: 2024-05-23

## 2024-05-21 RX ORDER — AMOXICILLIN 250 MG
2 CAPSULE ORAL 2 TIMES DAILY
Status: DISCONTINUED | OUTPATIENT
Start: 2024-05-21 | End: 2024-05-23 | Stop reason: HOSPADM

## 2024-05-21 RX ORDER — POLYETHYLENE GLYCOL 3350 17 G/17G
17 POWDER, FOR SOLUTION ORAL DAILY
Status: DISCONTINUED | OUTPATIENT
Start: 2024-05-21 | End: 2024-05-23 | Stop reason: HOSPADM

## 2024-05-21 RX ADMIN — ALBUTEROL SULFATE 2.5 MG: 2.5 SOLUTION RESPIRATORY (INHALATION) at 09:02

## 2024-05-21 RX ADMIN — TAMSULOSIN HYDROCHLORIDE 0.4 MG: 0.4 CAPSULE ORAL at 08:17

## 2024-05-21 RX ADMIN — HYDROMORPHONE HYDROCHLORIDE 0.5 MG: 1 INJECTION, SOLUTION INTRAMUSCULAR; INTRAVENOUS; SUBCUTANEOUS at 05:35

## 2024-05-21 RX ADMIN — HEPARIN SODIUM 5000 UNITS: 5000 INJECTION, SOLUTION INTRAVENOUS; SUBCUTANEOUS at 08:17

## 2024-05-21 RX ADMIN — DOCUSATE SODIUM 50 MG AND SENNOSIDES 8.6 MG 2 TABLET: 8.6; 5 TABLET, FILM COATED ORAL at 20:24

## 2024-05-21 RX ADMIN — HYDROMORPHONE HYDROCHLORIDE 4 MG: 4 TABLET ORAL at 15:45

## 2024-05-21 RX ADMIN — ALBUTEROL SULFATE 2.5 MG: 2.5 SOLUTION RESPIRATORY (INHALATION) at 21:06

## 2024-05-21 RX ADMIN — ALBUTEROL SULFATE 2.5 MG: 2.5 SOLUTION RESPIRATORY (INHALATION) at 15:21

## 2024-05-21 RX ADMIN — SODIUM PHOSPHATE 1 ENEMA: 7; 19 ENEMA RECTAL at 21:28

## 2024-05-21 RX ADMIN — FUROSEMIDE 20 MG: 20 TABLET ORAL at 11:14

## 2024-05-21 RX ADMIN — Medication 3 MG: at 21:24

## 2024-05-21 RX ADMIN — HEPARIN SODIUM 5000 UNITS: 5000 INJECTION, SOLUTION INTRAVENOUS; SUBCUTANEOUS at 15:46

## 2024-05-21 RX ADMIN — GUAIFENESIN 600 MG: 600 TABLET ORAL at 20:24

## 2024-05-21 RX ADMIN — GABAPENTIN 200 MG: 100 CAPSULE ORAL at 11:20

## 2024-05-21 RX ADMIN — ATORVASTATIN CALCIUM 40 MG: 40 TABLET, FILM COATED ORAL at 21:25

## 2024-05-21 RX ADMIN — MAGNESIUM HYDROXIDE 30 ML: 400 SUSPENSION ORAL at 08:17

## 2024-05-21 RX ADMIN — INSULIN GLARGINE 35 UNITS: 100 INJECTION, SOLUTION SUBCUTANEOUS at 08:22

## 2024-05-21 RX ADMIN — GUAIFENESIN 600 MG: 600 TABLET ORAL at 08:17

## 2024-05-21 RX ADMIN — GABAPENTIN 200 MG: 100 CAPSULE ORAL at 20:23

## 2024-05-21 RX ADMIN — DOCUSATE SODIUM 50 MG AND SENNOSIDES 8.6 MG 1 TABLET: 8.6; 5 TABLET, FILM COATED ORAL at 08:17

## 2024-05-21 RX ADMIN — METHOCARBAMOL TABLETS 750 MG: 750 TABLET, COATED ORAL at 21:24

## 2024-05-21 RX ADMIN — POLYETHYLENE GLYCOL 3350 17 G: 17 POWDER, FOR SOLUTION ORAL at 11:14

## 2024-05-21 ASSESSMENT — ACTIVITIES OF DAILY LIVING (ADL)
ADLS_ACUITY_SCORE: 25
ADLS_ACUITY_SCORE: 27
ADLS_ACUITY_SCORE: 25

## 2024-05-21 NOTE — PLAN OF CARE
Goal Outcome Evaluation:    Plan of Care Reviewed With: patient  Overall Patient Progress: improvingOverall Patient Progress: improving  BP (!) 147/90 (BP Location: Right arm)   Pulse 95   Temp 98.3  F (36.8  C) (Oral)   Resp 17   Ht 1.829 m (6')   Wt 102 kg (224 lb 12.8 oz)   SpO2 95%   BMI 30.49 kg/m     AVSS., sating mid 90s at RA while awake.  Reports good pain control with Epidural.   No c/o nausea. No po intake - reports poor appetite.  Abdomen distended. No gas, no BM,  Gave Miralax, MOM and senna. Pt up walking on unit.  Reported some difficulty voiding - monitor uop.   CT removed - post removal CXR done.  NPO after MN for a possible bronchoscopy tomorrow if needed.   Continue with current POC.

## 2024-05-21 NOTE — PROGRESS NOTES
"Pain Service Progress Note  [facility]  Date: 05/21/2024       Patient Name: Brendan Helms  MRN: [id number]  Age: 53 year old  Sex: male      Assessment:  Brendan Helms is a 53 year old male with tracheobronchomalacia.      Procedure: R posterolateral thoracotomy at the 4th rib, tracheobronchoplasty with prolene mesh, and intercostal nerve cryoablation.    Date of Surgery: 5/17/24    Date of Catheter Placement: 5/18/24    Plan/Recommendations:  1. Regional Anesthesia/Analgesia  -Continuous Catheter Type/Site: T8/9 epidural  Infusate: 0.125% bupivacaine with hydromorphone  Continuous Infusion at 12 mL/hr    Plan to maintain catheter, max of 7 days    2. Anticoagulation  -Please contact Inpatient Pain Service before ordering or making any anticoagulation changes       3. Multimodal Analgesia  - Per primary service    Pain Service will continue to follow.    Discussed with attending anesthesiologist    Shagufta Healy DO, PGY-2/CA-1  Anesthesia Resident   Inpatient Pain Service   Contact via [employer]       Overnight Events: NAEO.     Subjective: \"Not too bad\" noting his pain is well controlled but states he had a hard time sleeping, noting he requested a sleeping aid. Hoping to get his chest tube out soon.   Nausea: No  Vomiting: No  Pruritus: No  Symptoms of LAST: No    Pain Location:  Chest    Pain Intensity:    Pain at Rest: 0.5-1/10   Pain with Activity: 4/10  Comfort Goal: 4/10   Satisfied with your level of pain control: Yes    Diet: Consistent Carbohydrate Diet Moderate Consistent Carb (60 g CHO per Meal) Diet    Relevant Labs:  Recent Labs   Lab Test 05/21/24  0641 05/20/24  0550    216   BUN  --  24.3*       Physical Exam:  Vitals: /70 (BP Location: Right arm, Cuff Size: Adult Regular)   Pulse 96   Temp 98.2  F (36.8  C) (Oral)   Resp 18   Ht 1.829 m (6')   Wt 102 kg (224 lb 12.8 oz)   SpO2 100%   BMI 30.49 kg/m      Physical Exam:   Orientation:  Alert, " "oriented, and in no acute distress: Yes  Sedation: No    Motor Examination:  Moves extremities to antigravity.     Catheter Site:   Catheter entry site is clean/dry/intact: Yes    Tender: No      Relevant Medications:    Current Pain Medications:  Medications related to Pain Management (From now, onward)      Start     Dose/Rate Route Frequency Ordered Stop    05/20/24 1336  lidocaine 1 % injection           PRN 05/20/24 1336      05/20/24 1332  midazolam (VERSED) injection         over 2 Minutes Intravenous PRN 05/20/24 1332      05/20/24 1332  fentaNYL (PF) (SUBLIMAZE) injection         over 3-5 Minutes Intravenous PRN 05/20/24 1332      05/20/24 1324  lidocaine 4 % injection           PRN 05/20/24 1324      05/20/24 0000  acetaminophen (TYLENOL) tablet 650 mg         650 mg Oral EVERY 4 HOURS PRN 05/17/24 2312      05/20/24 0000  bisacodyl (DULCOLAX) suppository 10 mg         10 mg Rectal DAILY PRN 05/17/24 2312      05/19/24 0000  magnesium hydroxide (MILK OF MAGNESIA) suspension 30 mL         30 mL Oral DAILY PRN 05/17/24 2312      05/18/24 2000  Lidocaine (LIDOCARE) 4 % Patch 1 patch         1 patch  over 12 Hours Transdermal EVERY 24 HOURS 2000 05/18/24 1806      05/18/24 1230  HYDROmorphone (DILAUDID) 6 mcg/mL, BUPivacaine (MARCAINE) 0.125 % in sodium chloride 0.9 % 250 mL EPIDURAL Infusion         12 mL/hr  EPIDURAL CONTINUOUS 05/18/24 1127 05/23/24 1229    05/18/24 1127  nalbuphine (NUBAIN) injection 2.5-5 mg         2.5-5 mg Intravenous EVERY 6 HOURS PRN 05/18/24 1127      05/18/24 0858  HYDROmorphone (DILAUDID) tablet 2 mg        Placed in \"Or\" Linked Group    2 mg Oral EVERY 4 HOURS PRN 05/18/24 0859      05/18/24 0858  HYDROmorphone (DILAUDID) tablet 4 mg        Placed in \"Or\" Linked Group    4 mg Oral EVERY 4 HOURS PRN 05/18/24 0859      05/18/24 0510  HYDROmorphone (PF) (DILAUDID) injection 0.5 mg         0.5 mg Intravenous EVERY 4 HOURS PRN 05/18/24 0510      05/17/24 2750  senna-docusate " "(SENOKOT-S/PERICOLACE) 8.6-50 MG per tablet 1 tablet         1 tablet Oral 2 TIMES DAILY 05/17/24 2312 05/17/24 2312  hydrOXYzine HCl (ATARAX) tablet 25 mg         25 mg Oral EVERY 6 HOURS PRN 05/17/24 2312 05/17/24 2312  methocarbamol (ROBAXIN) tablet 750 mg         750 mg Oral EVERY 6 HOURS PRN 05/17/24 2312              Primary Service Contacted with Recommendations? Yes      Acute Inpatient Pain Service [facility]  Hours of pain coverage 24/7   Page via Am[employer]- Please Page the Pain Team Via [facility]: \"PAIN MANAGEMENT ACUTE INPATIENT/ [facility]\"         "

## 2024-05-21 NOTE — PLAN OF CARE
Goal Outcome Evaluation:      Plan of Care Reviewed With: patient    Overall Patient Progress: no changeOverall Patient Progress: no change    Vitals: Stable on 4 L oxygen nasal cannula  Neuro: A&Ox4  Mobility: Pt moves independently.  Pain/Nausea: Pain controlled with meds. Denies nausea.   Diet & GI: Regular diet. No BM this shift.  Labs: Monitored.  LDAs: L PIV SL, Epidural @ 12 mL/hr - dilaudid & bupivacaine, R CT to water seal  Skin/incisions: R lateral incision  Respiratory: No acute changes this shift.  Cardiac: No acute changes this shift.  : Voiding appropriately and spontaneously.     NEW CHANGES: No acute changes this shift.    Continue to implement Care Plan.    Brad Morales RN

## 2024-05-21 NOTE — PROGRESS NOTES
THORACIC & FOREGUT SURGERY    S:  Doing well.  Breathing improved after bronchoscopy.  Pain controlled.  Notes difficulty sleeping.  No other acute concerns.    O:  Vitals:    05/21/24 0400 05/21/24 0600 05/21/24 0800 05/21/24 0902   BP:       BP Location:       Cuff Size:       Pulse:       Resp:       Temp:       TempSrc:       SpO2: 99% 98% 97% 95%   Weight:       Height:           A&Ox3, NAD  Breathing non-labored on nasal cannula  RRR per tele  Soft, NDNT  Distal extremities warm    CT output thin serosang, no air leak    A/P: Brendan Helms is a 53 year old male POD#4 s/p Right posterolateral thoracotomy, Tracheobronchoplasty with prolene mesh, Intercostal nerve cryoablation. Doing well.    -Remove R chest tube today  -Atarax at bedtime for pain and sleep adjunct  -Bronchoscopy tentatively scheduled for tomorrow if continues with difficult to clear secretions  -Begin transition to PO based pain medication in preparation for discharge  - Incentive spirometer  -PO pain meds with IV backup as well as epidural catheter.  -Consistent carbohydrate diet  -Very high sliding scale insulin and carb coverage insulin (1 unit/15 grams)  -Lovenox DVT ppx    POSTOP COMPLICATIONS: None thus far     Seen with Dr. Dorothae Madden PA-C

## 2024-05-21 NOTE — PROVIDER NOTIFICATION
Brendan Helms 4725 - Could you pls review MAR orders? I gave Dilaudid, Robaxin, heparin. I see restrictions in the epidural order. No unusual S/Sx. Epi site CDI. Brad Morales [phone]    Above sent to Mary Morales RN    Call back from provider, Mary Max; stated that Dilaudid, Robaxin and Heparin are OK to give while pt has epidural with PCA.

## 2024-05-21 NOTE — PLAN OF CARE
/70 (BP Location: Right arm, Cuff Size: Adult Regular)   Pulse 96   Temp 98.2  F (36.8  C) (Oral)   Resp 18   Ht 1.829 m (6')   Wt 102 kg (224 lb 12.8 oz)   SpO2 100%   BMI 30.49 kg/m      Shift: 8741-2882   Isolation Status: n/a   VS: Stable on 4L, afebrile  Neuro: Aox4  Behaviors: Calm   BG: Type 1 diabetic; ACHS   Respiratory: Saturation maintained with 4L o2; Pt will remove o2 and state that it doesn't do anything but will desaturate down into the low 80s; chest tube to water seal   Cardiac: WDL  Pain/Nausea: Pain controlled with PCA, and PRN dilaudid; denies nausea   Diet: Regular   IV Access: Epidural; L PIV    Infusion(s): PCA 12 mL hr basal rate   Lines/Drains: Chest tube to water seal   GI/: Voiding spontaneously   Skin: CT; Epidural; PIV   Mobility: Independent   Plan: Pulmonary clean out this afternoon  Call light within reach. Pt is able to make their needs known.     Goal Outcome Evaluation:      Plan of Care Reviewed With: patient    Overall Patient Progress: no changeOverall Patient Progress: no change

## 2024-05-21 NOTE — PROVIDER NOTIFICATION
"Brendan Helms  7240 - pt asking for \"something to help me sleep - not just melatonin\"  Thank uBrad [phone]    Above sent to Mary Morales RN    "

## 2024-05-22 ENCOUNTER — DOCUMENTATION ONLY (OUTPATIENT)
Dept: MEDSURG UNIT | Facility: CLINIC | Age: 54
End: 2024-05-22

## 2024-05-22 ENCOUNTER — APPOINTMENT (OUTPATIENT)
Dept: GENERAL RADIOLOGY | Facility: CLINIC | Age: 54
DRG: 164 | End: 2024-05-22
Payer: COMMERCIAL

## 2024-05-22 LAB
ANION GAP SERPL CALCULATED.3IONS-SCNC: 9 MMOL/L (ref 7–15)
BUN SERPL-MCNC: 14.8 MG/DL (ref 6–20)
CALCIUM SERPL-MCNC: 8.7 MG/DL (ref 8.6–10)
CHLORIDE SERPL-SCNC: 91 MMOL/L (ref 98–107)
CREAT SERPL-MCNC: 0.89 MG/DL (ref 0.67–1.17)
DEPRECATED HCO3 PLAS-SCNC: 28 MMOL/L (ref 22–29)
EGFRCR SERPLBLD CKD-EPI 2021: >90 ML/MIN/1.73M2
ERYTHROCYTE [DISTWIDTH] IN BLOOD BY AUTOMATED COUNT: 13.8 % (ref 10–15)
GLUCOSE BLDC GLUCOMTR-MCNC: 114 MG/DL (ref 70–99)
GLUCOSE BLDC GLUCOMTR-MCNC: 156 MG/DL (ref 70–99)
GLUCOSE BLDC GLUCOMTR-MCNC: 238 MG/DL (ref 70–99)
GLUCOSE BLDC GLUCOMTR-MCNC: 263 MG/DL (ref 70–99)
GLUCOSE SERPL-MCNC: 207 MG/DL (ref 70–99)
HCT VFR BLD AUTO: 36.6 % (ref 40–53)
HGB BLD-MCNC: 12.3 G/DL (ref 13.3–17.7)
MAGNESIUM SERPL-MCNC: 2.3 MG/DL (ref 1.7–2.3)
MCH RBC QN AUTO: 29.3 PG (ref 26.5–33)
MCHC RBC AUTO-ENTMCNC: 33.6 G/DL (ref 31.5–36.5)
MCV RBC AUTO: 87 FL (ref 78–100)
PATH REPORT.COMMENTS IMP SPEC: NORMAL
PATH REPORT.COMMENTS IMP SPEC: NORMAL
PATH REPORT.FINAL DX SPEC: NORMAL
PATH REPORT.GROSS SPEC: NORMAL
PATH REPORT.MICROSCOPIC SPEC OTHER STN: NORMAL
PATH REPORT.RELEVANT HX SPEC: NORMAL
PHOSPHATE SERPL-MCNC: 3.6 MG/DL (ref 2.5–4.5)
PHOTO IMAGE: NORMAL
PLATELET # BLD AUTO: 286 10E3/UL (ref 150–450)
POTASSIUM SERPL-SCNC: 5 MMOL/L (ref 3.4–5.3)
RBC # BLD AUTO: 4.2 10E6/UL (ref 4.4–5.9)
SODIUM SERPL-SCNC: 128 MMOL/L (ref 135–145)
WBC # BLD AUTO: 6.1 10E3/UL (ref 4–11)

## 2024-05-22 PROCEDURE — 258N000003 HC RX IP 258 OP 636: Performed by: ANESTHESIOLOGY

## 2024-05-22 PROCEDURE — 999N000157 HC STATISTIC RCP TIME EA 10 MIN

## 2024-05-22 PROCEDURE — 250N000013 HC RX MED GY IP 250 OP 250 PS 637

## 2024-05-22 PROCEDURE — 120N000002 HC R&B MED SURG/OB UMMC

## 2024-05-22 PROCEDURE — 84100 ASSAY OF PHOSPHORUS: CPT | Performed by: THORACIC SURGERY (CARDIOTHORACIC VASCULAR SURGERY)

## 2024-05-22 PROCEDURE — 250N000011 HC RX IP 250 OP 636: Performed by: ANESTHESIOLOGY

## 2024-05-22 PROCEDURE — 36415 COLL VENOUS BLD VENIPUNCTURE: CPT

## 2024-05-22 PROCEDURE — 250N000013 HC RX MED GY IP 250 OP 250 PS 637: Performed by: PHYSICIAN ASSISTANT

## 2024-05-22 PROCEDURE — 71045 X-RAY EXAM CHEST 1 VIEW: CPT

## 2024-05-22 PROCEDURE — 85027 COMPLETE CBC AUTOMATED: CPT

## 2024-05-22 PROCEDURE — 71045 X-RAY EXAM CHEST 1 VIEW: CPT | Mod: 26 | Performed by: RADIOLOGY

## 2024-05-22 PROCEDURE — 80048 BASIC METABOLIC PNL TOTAL CA: CPT

## 2024-05-22 PROCEDURE — 99207 PR NO BILLABLE SERVICE THIS VISIT: CPT | Mod: GC | Performed by: ANESTHESIOLOGY

## 2024-05-22 PROCEDURE — 83735 ASSAY OF MAGNESIUM: CPT

## 2024-05-22 PROCEDURE — 250N000009 HC RX 250

## 2024-05-22 PROCEDURE — 250N000012 HC RX MED GY IP 250 OP 636 PS 637

## 2024-05-22 PROCEDURE — 94640 AIRWAY INHALATION TREATMENT: CPT | Mod: 76

## 2024-05-22 PROCEDURE — 250N000011 HC RX IP 250 OP 636

## 2024-05-22 RX ORDER — POLYETHYLENE GLYCOL 3350 17 G/17G
17 POWDER, FOR SOLUTION ORAL DAILY PRN
Qty: 510 G | Refills: 0 | Status: SHIPPED | OUTPATIENT
Start: 2024-05-22

## 2024-05-22 RX ORDER — METHOCARBAMOL 750 MG/1
750 TABLET, FILM COATED ORAL EVERY 8 HOURS PRN
Qty: 30 TABLET | Refills: 0 | Status: SHIPPED | OUTPATIENT
Start: 2024-05-22

## 2024-05-22 RX ORDER — GUAIFENESIN 600 MG/1
600 TABLET, EXTENDED RELEASE ORAL 2 TIMES DAILY
Qty: 14 TABLET | Refills: 0 | Status: SHIPPED | OUTPATIENT
Start: 2024-05-22

## 2024-05-22 RX ORDER — ACETAMINOPHEN 325 MG/1
650 TABLET ORAL EVERY 4 HOURS PRN
COMMUNITY
Start: 2024-05-22 | End: 2024-05-23

## 2024-05-22 RX ORDER — GABAPENTIN 300 MG/1
300 CAPSULE ORAL 2 TIMES DAILY
Qty: 120 CAPSULE | Refills: 0 | Status: SHIPPED | OUTPATIENT
Start: 2024-05-22 | End: 2024-07-10

## 2024-05-22 RX ORDER — HYDROMORPHONE HCL IN WATER/PF 6 MG/30 ML
0.2 PATIENT CONTROLLED ANALGESIA SYRINGE INTRAVENOUS EVERY 6 HOURS PRN
Status: DISCONTINUED | OUTPATIENT
Start: 2024-05-22 | End: 2024-05-23

## 2024-05-22 RX ORDER — HYDROMORPHONE HYDROCHLORIDE 2 MG/1
2 TABLET ORAL EVERY 4 HOURS PRN
Qty: 30 TABLET | Refills: 0 | Status: SHIPPED | OUTPATIENT
Start: 2024-05-22 | End: 2024-05-23

## 2024-05-22 RX ORDER — RAMELTEON 8 MG/1
8 TABLET ORAL AT BEDTIME
Status: DISCONTINUED | OUTPATIENT
Start: 2024-05-22 | End: 2024-05-23 | Stop reason: HOSPADM

## 2024-05-22 RX ADMIN — GABAPENTIN 200 MG: 100 CAPSULE ORAL at 08:09

## 2024-05-22 RX ADMIN — INSULIN GLARGINE 35 UNITS: 100 INJECTION, SOLUTION SUBCUTANEOUS at 08:10

## 2024-05-22 RX ADMIN — GUAIFENESIN 600 MG: 600 TABLET ORAL at 08:10

## 2024-05-22 RX ADMIN — POLYETHYLENE GLYCOL 3350 17 G: 17 POWDER, FOR SOLUTION ORAL at 08:10

## 2024-05-22 RX ADMIN — LIDOCAINE 4% 1 PATCH: 40 PATCH TOPICAL at 19:43

## 2024-05-22 RX ADMIN — METHOCARBAMOL TABLETS 750 MG: 750 TABLET, COATED ORAL at 08:09

## 2024-05-22 RX ADMIN — MAGNESIUM HYDROXIDE 30 ML: 400 SUSPENSION ORAL at 08:10

## 2024-05-22 RX ADMIN — DOCUSATE SODIUM 50 MG AND SENNOSIDES 8.6 MG 2 TABLET: 8.6; 5 TABLET, FILM COATED ORAL at 08:09

## 2024-05-22 RX ADMIN — METHOCARBAMOL TABLETS 750 MG: 750 TABLET, COATED ORAL at 14:35

## 2024-05-22 RX ADMIN — ACETAMINOPHEN 650 MG: 325 TABLET, FILM COATED ORAL at 19:51

## 2024-05-22 RX ADMIN — FUROSEMIDE 20 MG: 20 TABLET ORAL at 08:10

## 2024-05-22 RX ADMIN — ACETAMINOPHEN 650 MG: 325 TABLET, FILM COATED ORAL at 15:50

## 2024-05-22 RX ADMIN — DOCUSATE SODIUM 50 MG AND SENNOSIDES 8.6 MG 2 TABLET: 8.6; 5 TABLET, FILM COATED ORAL at 19:43

## 2024-05-22 RX ADMIN — HYDROMORPHONE HYDROCHLORIDE 4 MG: 4 TABLET ORAL at 08:10

## 2024-05-22 RX ADMIN — HYDROMORPHONE HYDROCHLORIDE: 1 INJECTION, SOLUTION INTRAMUSCULAR; INTRAVENOUS; SUBCUTANEOUS at 05:59

## 2024-05-22 RX ADMIN — RAMELTEON 8 MG: 8 TABLET ORAL at 22:05

## 2024-05-22 RX ADMIN — HEPARIN SODIUM 5000 UNITS: 5000 INJECTION, SOLUTION INTRAVENOUS; SUBCUTANEOUS at 16:48

## 2024-05-22 RX ADMIN — ALBUTEROL SULFATE 2.5 MG: 2.5 SOLUTION RESPIRATORY (INHALATION) at 21:09

## 2024-05-22 RX ADMIN — HYDROMORPHONE HYDROCHLORIDE 4 MG: 4 TABLET ORAL at 18:35

## 2024-05-22 RX ADMIN — METHOCARBAMOL TABLETS 750 MG: 750 TABLET, COATED ORAL at 22:05

## 2024-05-22 RX ADMIN — TAMSULOSIN HYDROCHLORIDE 0.4 MG: 0.4 CAPSULE ORAL at 08:10

## 2024-05-22 RX ADMIN — GABAPENTIN 200 MG: 100 CAPSULE ORAL at 19:43

## 2024-05-22 RX ADMIN — GUAIFENESIN 600 MG: 600 TABLET ORAL at 19:43

## 2024-05-22 RX ADMIN — ATORVASTATIN CALCIUM 40 MG: 40 TABLET, FILM COATED ORAL at 22:06

## 2024-05-22 RX ADMIN — ALBUTEROL SULFATE 2.5 MG: 2.5 SOLUTION RESPIRATORY (INHALATION) at 16:03

## 2024-05-22 RX ADMIN — HYDROMORPHONE HYDROCHLORIDE 4 MG: 4 TABLET ORAL at 14:35

## 2024-05-22 RX ADMIN — ALBUTEROL SULFATE 2.5 MG: 2.5 SOLUTION RESPIRATORY (INHALATION) at 09:13

## 2024-05-22 ASSESSMENT — ACTIVITIES OF DAILY LIVING (ADL)
ADLS_ACUITY_SCORE: 24
ADLS_ACUITY_SCORE: 25
ADLS_ACUITY_SCORE: 24
ADLS_ACUITY_SCORE: 24
ADLS_ACUITY_SCORE: 25
ADLS_ACUITY_SCORE: 24
ADLS_ACUITY_SCORE: 24

## 2024-05-22 NOTE — PROVIDER NOTIFICATION
Brendan Helms 7240 - Pt asking for trazodone for sleep again tonolena. thank you, Brad Morales -[phone]    Above sent to Mary Morales, JOSHUA      RESPONSE: Try melatonin first, if pt unable to sleep  mins after, then page for trazodone.

## 2024-05-22 NOTE — PROGRESS NOTES
THORACIC & FOREGUT SURGERY    S:  Chest tube out yesterday. Doing well. Pain controlled when seen this morning. Epidural catheter removed today by anesthesia this morning. Plan had been to potentially discharge this afternoon pending pain control. When seen this afternoon patient reported that he had increased pain compared to this morning and would feel more comfortable discharging in the morning.    O:  Vitals:    05/21/24 1557 05/21/24 2210 05/22/24 0841 05/22/24 0913   BP: 129/72 (!) 142/76 132/76    BP Location: Right arm Right arm Right arm    Pulse: 105 100 98 85   Resp: 16 16 16    Temp: 98.1  F (36.7  C) 98.4  F (36.9  C) 98.4  F (36.9  C)    TempSrc: Oral Oral Oral    SpO2: 94% 91% 91% 98%   Weight:  104.3 kg (230 lb)     Height:           A&Ox3, NAD  Breathing non-labored on room air  wwp  Abdomen soft, non-distended  Distal extremities warm    A/P: Brendan Helms is a 53 year old male POD#5 s/p Right posterolateral thoracotomy, Tracheobronchoplasty with prolene mesh, Intercostal nerve cryoablation. Doing well.      -Atarax at bedtime for pain and sleep adjunct  - Continue transition to PO based pain medication in preparation for discharge  - Epidural catheter removed  - Will stay overnight for pain control. Discharge tomorrow morning.  - Incentive spirometer  -Consistent carbohydrate diet  -Very high sliding scale insulin and carb coverage insulin (1 unit/15 grams)  -Lovenox DVT ppx    POSTOP COMPLICATIONS: None thus far     Seen with Dr. Tim Horne MD  PGY1

## 2024-05-22 NOTE — PROGRESS NOTES
Pain Service Progress Note  [facility]  Date: 05/22/2024       Patient Name: Brendan Helms  MRN: [id number]  Age: 53 year old  Sex: male      Assessment:  Brendan Helms is a 53 year old male with tracheobronchomalacia.      Procedure: R posterolateral thoracotomy at the 4th rib, tracheobronchoplasty with prolene mesh, and intercostal nerve cryoablation.    Date of Surgery: 5/17/24    Date of Catheter Placement: 5/18/24    Plan/Recommendations:  1. Regional Anesthesia/Analgesia  -Continuous Catheter Type/Site: T8/9 epidural  Infusate: 0.125% bupivacaine with hydromorphone  Continuous Infusion at 12 mL/hr - discontinued    Removed catheter at 0730 hours today with tip intact.     2. Anticoagulation  - May resume SQH per primary team.        3. Multimodal Analgesia  - Per primary service    Pain Service will sign off.    Discussed with attending anesthesiologist    Shagufta Healy DO, PGY-2/CA-1  Anesthesia Resident   Inpatient Pain Service   Contact via [employer]       Overnight Events: NAEO.     Subjective: Good. Main complaint is abdominal bloating.   Nausea: No  Vomiting: No  Pruritus: No  Symptoms of LAST: No    Pain Location:  Chest    Pain Intensity:    Pain at Rest: 4/10   Pain with Activity: 4-5/10  Comfort Goal: 4/10   Satisfied with your level of pain control: Yes    Diet: NPO per Anesthesia Guidelines for Procedure/Surgery Except for: Meds    Relevant Labs:  Recent Labs   Lab Test 05/22/24  0635      BUN 14.8       Physical Exam:  Vitals: /76 (BP Location: Right arm)   Pulse 98   Temp 98.4  F (36.9  C) (Oral)   Resp 16   Ht 1.829 m (6')   Wt 104.3 kg (230 lb)   SpO2 91%   BMI 31.19 kg/m      Physical Exam:   Orientation:  Alert, oriented, and in no acute distress: Yes  Sedation: No    Motor Examination:  Moves extremities to antigravity.     Catheter Site:   Catheter entry site is clean/dry/intact: Yes    Tender: No      Relevant Medications:  Current Pain  "Medications:  Medications related to Pain Management (From now, onward)      Start     Dose/Rate Route Frequency Ordered Stop    05/21/24 2000  senna-docusate (SENOKOT-S/PERICOLACE) 8.6-50 MG per tablet 2 tablet         2 tablet Oral 2 TIMES DAILY 05/21/24 1012      05/21/24 1130  gabapentin (NEURONTIN) capsule 200 mg         200 mg Oral 2 TIMES DAILY 05/21/24 1108 07/20/24 0759    05/21/24 1030  polyethylene glycol (MIRALAX) Packet 17 g         17 g Oral DAILY 05/21/24 1012      05/21/24 1011  hydrOXYzine HCl (ATARAX) tablet 25 mg         25 mg Oral AT BEDTIME PRN 05/21/24 1012      05/20/24 1336  lidocaine 1 % injection           PRN 05/20/24 1336      05/20/24 1332  midazolam (VERSED) injection         over 2 Minutes Intravenous PRN 05/20/24 1332      05/20/24 1332  fentaNYL (PF) (SUBLIMAZE) injection         over 3-5 Minutes Intravenous PRN 05/20/24 1332      05/20/24 1324  lidocaine 4 % injection           PRN 05/20/24 1324      05/20/24 0000  acetaminophen (TYLENOL) tablet 650 mg         650 mg Oral EVERY 4 HOURS PRN 05/17/24 2312      05/20/24 0000  bisacodyl (DULCOLAX) suppository 10 mg         10 mg Rectal DAILY PRN 05/17/24 2312      05/19/24 0000  magnesium hydroxide (MILK OF MAGNESIA) suspension 30 mL         30 mL Oral DAILY PRN 05/17/24 2312      05/18/24 2000  Lidocaine (LIDOCARE) 4 % Patch 1 patch         1 patch  over 12 Hours Transdermal EVERY 24 HOURS 2000 05/18/24 1806      05/18/24 1230  HYDROmorphone (DILAUDID) 6 mcg/mL, BUPivacaine (MARCAINE) 0.125 % in sodium chloride 0.9 % 250 mL EPIDURAL Infusion         12 mL/hr  EPIDURAL CONTINUOUS 05/18/24 1127 05/23/24 1229    05/18/24 1127  nalbuphine (NUBAIN) injection 2.5-5 mg         2.5-5 mg Intravenous EVERY 6 HOURS PRN 05/18/24 1127      05/18/24 0858  HYDROmorphone (DILAUDID) tablet 2 mg        Placed in \"Or\" Linked Group    2 mg Oral EVERY 4 HOURS PRN 05/18/24 0859      05/18/24 0858  HYDROmorphone (DILAUDID) tablet 4 mg        Placed in \"Or\" " "Linked Group    4 mg Oral EVERY 4 HOURS PRN 05/18/24 0859      05/18/24 0510  HYDROmorphone (PF) (DILAUDID) injection 0.5 mg         0.5 mg Intravenous EVERY 4 HOURS PRN 05/18/24 0510      05/17/24 2312  methocarbamol (ROBAXIN) tablet 750 mg         750 mg Oral EVERY 6 HOURS PRN 05/17/24 2312                  Primary Service Contacted with Recommendations? No      Acute Inpatient Pain Service [facility]  Hours of pain coverage 24/7   Page via Am[employer]- Please Page the Pain Team Via [employer]: \"PAIN MANAGEMENT ACUTE INPATIENT/ [facility]\"         "

## 2024-05-22 NOTE — PLAN OF CARE
Goal Outcome Evaluation:      Plan of Care Reviewed With: patient    Overall Patient Progress: improvingOverall Patient Progress: improving    Vitals: Stable on RA  Neuro: A&Ox4  Mobility: Pt moves independently.  Pain/Nausea: Pain controlled with meds. Denies nausea.   Diet & GI: Regular diet. 1 BM this shift following enema.  Labs: Monitored.  LDAs: L PIV SL, Epidural @ 12 mL/hr - dilaudid & bupivacaine  Skin/incisions: R lateral incision  Respiratory: No acute changes this shift.  Cardiac: No acute changes this shift.  : Voiding appropriately and spontaneously.     NEW CHANGES: No acute changes this shift.    Continue to implement Care Plan.    Brad Morales RN

## 2024-05-22 NOTE — PLAN OF CARE
Goal Outcome Evaluation:      Plan of Care Reviewed With: patient    Overall Patient Progress: no change    Neuro:A/Ox4  Respiratory:WDL. Denies SOB  Cardiac:WDL. Denies chest pain  Diet:NPO  GI/: voiding spontaneously  Incision/Drains: R transverse chest incision-CDI, 3 abd port sites-CDI, R old CT site-CDI  IV Access: L PIV SL  Pain: managed with epidural pump 12ml/hr  Labs:reviewed   VS:Temp: 98.4  F (36.9  C) Temp src: Oral BP: (!) 142/76 Pulse: 100   Resp: 16 SpO2: 91 % O2 Device: None (Room air) Oxygen Delivery: 2 LPM   Activity:ind     New Changes this shift:none  Plan: pending bronchoscopy

## 2024-05-22 NOTE — PROGRESS NOTES
Prior Authorization Approval    Medication: HYDROMORPHONE HCL 2 MG PO TABS  PA Initiated: 5/22/2024  PA Type: Clinical    Insurance: [employer] - Phone [phone] Fax [phone]  Critical access hospital Key / Reference #: [id number]   Authorization Effective Dates: 5/22/2024 - 6/22/2024    Expected CoPay: $ 2.28  CoPay Card Eligible: No    Filling Pharmacy: [facility] [address]  Comments:  Submitted via fax.    Maddy Jones  [employer] Pharm[facility] Liaison  Ph: [phone]  Fax: [phone]  Available on [employer] (learn more here)    Prior Authorization Initiated    Medication: HYDROMORPHONE HCL 2 MG PO TABS  Insurance Company: [employer] [phone] Fax [phone]  Filling Pharmacy: [facility] [address]  Start Date: 5/22/2024  Critical access hospital Key / Reference #: faxed request    Maddy Jones  [employer] Pharmacy Liaison  Ph: [phone]  Fax: [phone]  Available on [employer] (learn more here)

## 2024-05-23 VITALS
SYSTOLIC BLOOD PRESSURE: 125 MMHG | HEIGHT: 72 IN | DIASTOLIC BLOOD PRESSURE: 64 MMHG | BODY MASS INDEX: 31.15 KG/M2 | OXYGEN SATURATION: 92 % | HEART RATE: 80 BPM | WEIGHT: 230 LBS | RESPIRATION RATE: 16 BRPM | TEMPERATURE: 98.3 F

## 2024-05-23 LAB
ANION GAP SERPL CALCULATED.3IONS-SCNC: 10 MMOL/L (ref 7–15)
BUN SERPL-MCNC: 10.7 MG/DL (ref 6–20)
CALCIUM SERPL-MCNC: 8.4 MG/DL (ref 8.6–10)
CHLORIDE SERPL-SCNC: 96 MMOL/L (ref 98–107)
CREAT SERPL-MCNC: 0.8 MG/DL (ref 0.67–1.17)
DEPRECATED HCO3 PLAS-SCNC: 28 MMOL/L (ref 22–29)
EGFRCR SERPLBLD CKD-EPI 2021: >90 ML/MIN/1.73M2
ERYTHROCYTE [DISTWIDTH] IN BLOOD BY AUTOMATED COUNT: 13.6 % (ref 10–15)
GLUCOSE BLDC GLUCOMTR-MCNC: 125 MG/DL (ref 70–99)
GLUCOSE BLDC GLUCOMTR-MCNC: 131 MG/DL (ref 70–99)
GLUCOSE BLDC GLUCOMTR-MCNC: 138 MG/DL (ref 70–99)
GLUCOSE BLDC GLUCOMTR-MCNC: 182 MG/DL (ref 70–99)
GLUCOSE BLDC GLUCOMTR-MCNC: 71 MG/DL (ref 70–99)
GLUCOSE SERPL-MCNC: 224 MG/DL (ref 70–99)
HCT VFR BLD AUTO: 35 % (ref 40–53)
HGB BLD-MCNC: 11.7 G/DL (ref 13.3–17.7)
MAGNESIUM SERPL-MCNC: 2.2 MG/DL (ref 1.7–2.3)
MCH RBC QN AUTO: 28.9 PG (ref 26.5–33)
MCHC RBC AUTO-ENTMCNC: 33.4 G/DL (ref 31.5–36.5)
MCV RBC AUTO: 86 FL (ref 78–100)
PHOSPHATE SERPL-MCNC: 3.6 MG/DL (ref 2.5–4.5)
PLATELET # BLD AUTO: 284 10E3/UL (ref 150–450)
POTASSIUM SERPL-SCNC: 4.6 MMOL/L (ref 3.4–5.3)
RBC # BLD AUTO: 4.05 10E6/UL (ref 4.4–5.9)
SODIUM SERPL-SCNC: 134 MMOL/L (ref 135–145)
WBC # BLD AUTO: 6.7 10E3/UL (ref 4–11)

## 2024-05-23 PROCEDURE — 999N000157 HC STATISTIC RCP TIME EA 10 MIN

## 2024-05-23 PROCEDURE — 250N000013 HC RX MED GY IP 250 OP 250 PS 637: Performed by: PHYSICIAN ASSISTANT

## 2024-05-23 PROCEDURE — 250N000011 HC RX IP 250 OP 636

## 2024-05-23 PROCEDURE — 83735 ASSAY OF MAGNESIUM: CPT

## 2024-05-23 PROCEDURE — 250N000013 HC RX MED GY IP 250 OP 250 PS 637

## 2024-05-23 PROCEDURE — 80048 BASIC METABOLIC PNL TOTAL CA: CPT

## 2024-05-23 PROCEDURE — 94640 AIRWAY INHALATION TREATMENT: CPT | Mod: 76

## 2024-05-23 PROCEDURE — 84100 ASSAY OF PHOSPHORUS: CPT | Performed by: THORACIC SURGERY (CARDIOTHORACIC VASCULAR SURGERY)

## 2024-05-23 PROCEDURE — 250N000009 HC RX 250

## 2024-05-23 PROCEDURE — 36415 COLL VENOUS BLD VENIPUNCTURE: CPT

## 2024-05-23 PROCEDURE — 85027 COMPLETE CBC AUTOMATED: CPT

## 2024-05-23 RX ORDER — ACETAMINOPHEN 500 MG
1000 TABLET ORAL EVERY 6 HOURS
Status: DISCONTINUED | OUTPATIENT
Start: 2024-05-23 | End: 2024-05-23 | Stop reason: HOSPADM

## 2024-05-23 RX ORDER — IBUPROFEN 600 MG/1
600 TABLET, FILM COATED ORAL EVERY 6 HOURS
Status: DISCONTINUED | OUTPATIENT
Start: 2024-05-23 | End: 2024-05-23 | Stop reason: HOSPADM

## 2024-05-23 RX ORDER — ACETAMINOPHEN 500 MG
1000 TABLET ORAL EVERY 6 HOURS
COMMUNITY
Start: 2024-05-23

## 2024-05-23 RX ORDER — IBUPROFEN 600 MG/1
600 TABLET, FILM COATED ORAL EVERY 6 HOURS PRN
Status: DISCONTINUED | OUTPATIENT
Start: 2024-05-23 | End: 2024-05-23

## 2024-05-23 RX ORDER — HYDROXYZINE HYDROCHLORIDE 25 MG/1
50 TABLET, FILM COATED ORAL EVERY 6 HOURS PRN
Status: DISCONTINUED | OUTPATIENT
Start: 2024-05-23 | End: 2024-05-23 | Stop reason: HOSPADM

## 2024-05-23 RX ORDER — ACETAMINOPHEN 325 MG/1
975 TABLET ORAL EVERY 6 HOURS
Status: DISCONTINUED | OUTPATIENT
Start: 2024-05-23 | End: 2024-05-23

## 2024-05-23 RX ORDER — HYDROMORPHONE HYDROCHLORIDE 4 MG/1
4 TABLET ORAL EVERY 4 HOURS PRN
Qty: 40 TABLET | Refills: 0 | Status: ON HOLD | OUTPATIENT
Start: 2024-05-23 | End: 2024-06-02

## 2024-05-23 RX ORDER — IBUPROFEN 600 MG/1
600 TABLET, FILM COATED ORAL EVERY 6 HOURS PRN
COMMUNITY
Start: 2024-05-23

## 2024-05-23 RX ORDER — HYDROXYZINE HYDROCHLORIDE 25 MG/1
25 TABLET, FILM COATED ORAL EVERY 6 HOURS PRN
Status: DISCONTINUED | OUTPATIENT
Start: 2024-05-23 | End: 2024-05-23 | Stop reason: HOSPADM

## 2024-05-23 RX ORDER — GABAPENTIN 300 MG/1
300 CAPSULE ORAL 2 TIMES DAILY
Status: DISCONTINUED | OUTPATIENT
Start: 2024-05-23 | End: 2024-05-23 | Stop reason: HOSPADM

## 2024-05-23 RX ADMIN — HYDROMORPHONE HYDROCHLORIDE 4 MG: 4 TABLET ORAL at 14:05

## 2024-05-23 RX ADMIN — TAMSULOSIN HYDROCHLORIDE 0.4 MG: 0.4 CAPSULE ORAL at 07:59

## 2024-05-23 RX ADMIN — HEPARIN SODIUM 5000 UNITS: 5000 INJECTION, SOLUTION INTRAVENOUS; SUBCUTANEOUS at 16:02

## 2024-05-23 RX ADMIN — GUAIFENESIN 600 MG: 600 TABLET ORAL at 07:58

## 2024-05-23 RX ADMIN — HEPARIN SODIUM 5000 UNITS: 5000 INJECTION, SOLUTION INTRAVENOUS; SUBCUTANEOUS at 02:43

## 2024-05-23 RX ADMIN — ACETAMINOPHEN 1000 MG: 500 TABLET ORAL at 14:05

## 2024-05-23 RX ADMIN — METHOCARBAMOL TABLETS 750 MG: 750 TABLET, COATED ORAL at 11:54

## 2024-05-23 RX ADMIN — HYDROMORPHONE HYDROCHLORIDE 4 MG: 4 TABLET ORAL at 02:43

## 2024-05-23 RX ADMIN — POLYETHYLENE GLYCOL 3350 17 G: 17 POWDER, FOR SOLUTION ORAL at 07:59

## 2024-05-23 RX ADMIN — HEPARIN SODIUM 5000 UNITS: 5000 INJECTION, SOLUTION INTRAVENOUS; SUBCUTANEOUS at 07:59

## 2024-05-23 RX ADMIN — HYDROMORPHONE HYDROCHLORIDE 4 MG: 4 TABLET ORAL at 07:59

## 2024-05-23 RX ADMIN — ACETAMINOPHEN 975 MG: 325 TABLET, FILM COATED ORAL at 07:58

## 2024-05-23 RX ADMIN — IBUPROFEN 600 MG: 600 TABLET, FILM COATED ORAL at 16:02

## 2024-05-23 RX ADMIN — DOCUSATE SODIUM 50 MG AND SENNOSIDES 8.6 MG 2 TABLET: 8.6; 5 TABLET, FILM COATED ORAL at 08:01

## 2024-05-23 RX ADMIN — INSULIN GLARGINE 35 UNITS: 100 INJECTION, SOLUTION SUBCUTANEOUS at 07:59

## 2024-05-23 RX ADMIN — GABAPENTIN 300 MG: 300 CAPSULE ORAL at 07:59

## 2024-05-23 RX ADMIN — HYDROXYZINE HYDROCHLORIDE 50 MG: 25 TABLET, FILM COATED ORAL at 07:59

## 2024-05-23 RX ADMIN — FUROSEMIDE 20 MG: 20 TABLET ORAL at 07:59

## 2024-05-23 RX ADMIN — ALBUTEROL SULFATE 2.5 MG: 2.5 SOLUTION RESPIRATORY (INHALATION) at 02:19

## 2024-05-23 RX ADMIN — ALBUTEROL SULFATE 2.5 MG: 2.5 SOLUTION RESPIRATORY (INHALATION) at 13:42

## 2024-05-23 RX ADMIN — IBUPROFEN 600 MG: 600 TABLET, FILM COATED ORAL at 11:54

## 2024-05-23 ASSESSMENT — ACTIVITIES OF DAILY LIVING (ADL)
ADLS_ACUITY_SCORE: 22

## 2024-05-23 NOTE — PROGRESS NOTES
THORACIC & FOREGUT SURGERY BRIEF UPDATE    Came by to evaluate Rigoberto for discharge. Long discussion had regarding pain regimen, appropriate dosing, and weaning protocol once home. He demonstrated good effort with IS and states his pain is controlled well enough and felt he'd be better suited to continue his recovery at home.     I clarified and he endorsed he was satisfied with his current pain regimen and no further adjustments were needed. Will proceed with discharge today.     Rod Livingston PA-C  Thoracic and Foregut Surgery

## 2024-05-23 NOTE — PLAN OF CARE
Pt. discharged at 1830 to home, was accompanied by spouse, and left with personal belongings. Pt. received complete discharge paperwork and discharge medications as filled by discharge pharmacy. Pt. was given times of last dose for all discharge medications in writing on discharge medication sheets. Discharge teaching included discharge medication, pain management, activity restrictions, dressing changes, and signs and symptoms of infection. Dressing supplies sent home. Pt. had no further questions at the time of discharge and no unmet needs were identified.

## 2024-05-23 NOTE — DISCHARGE SUMMARY
NAME: Brendan Helms   MRN: [id number]   : 1970     DATE OF ADMISSION: 2024     PRE/POSTOPERATIVE DIAGNOSES: Tracheobronchomalacia and EDAC     PROCEDURES PERFORMED:   Flexible bronchoscopy   Right posterolateral thoracotomy, 4th ICS.   Tracheobronchoplasty with prolene mesh  Intercostal nerve cryoablation  Intercostal nerve block.     PATHOLOGY RESULTS: Benign lymphnodes    CULTURE RESULTS: None     INTRAOPERATIVE COMPLICATIONS: None     POSTOPERATIVE MEDICAL ISSUES: Airway secretion clearance requiring post op bronchoscopy x1, pain control     DRAINS/TUBES PRESENT AT DISCHARGE: dressing changes    DATE OF DISCHARGE:  2024    HOSPITAL COURSE: Brendan Helms is a 53 year old male who on 2024 underwent the above-named procedures.  He tolerated the operation well and postoperatively was transferred to the general post-surgical unit.  His course was complicated by difficult pain control and difficulty clearing secretions secondary to pain control. He required bronchoscopy on  and subsequently was able to self clear his secretions. His epidural was weaned and removed as well as IV opioids. Prior to discharge, his pain was controlled, he was able to perform ADLs and ambulate independently without difficulty, and had full return of bowel and bladder function.  On , he was discharged home in stable condition with a mutually agreed on pain regimen in place.    DISCHARGE EXAM:   A&O, NAD  Resp non-labored  Distal extremities warm    Incisions CDI     DISCHARGE INSTRUCTIONS:  Discharge Procedure Orders   Reason for your hospital stay   Order Comments: surgery     Activity   Order Comments: As in discharge instruction section     Order Specific Question Answer Comments   Is discharge order? Yes      Adult [facility] Follow-up and recommended labs and tests   Order Comments:      XR CHEST 2 VIEWS  10:00 AM  (20 min.)  [id number]  [facility] [place]    RETURN  CCSL  10:45 AM  (30 min.)  Lisa Gandara MD  [facility]  Cancer Clinic        Appointments on [place] and/or [facility] (with [facility] or [facility] provider or service). Call [phone] if you haven't heard regarding these appointments within 7 days of discharge.     Discharge Instructions   Order Comments: THORACIC SURGERY DISCHARGE INSTRUCTIONS    DIET: Regular diet - as prior to admission     If your plans upon discharge include prolonged periods of sitting (i.e a lengthy car or plane ride), it is highly beneficial to get up and walk at least once per hour to help prevent swelling and blood clots.     You may remove chest tube dressing 48 hours after tube removal and bandage the site at your own discretion thereafter.  Small amounts of leakage are normal for 2-3 days after removal.  Feel free to call with questions.    You may get incision wet 2 days after operation. Do not submerge, soak, or scrub incision or swim until seen in follow-up.    Take incentive spirometer home for continued frequent use    Activity as tolerated, no strenous activity until seen in follow-up, no lifting greater than 20 pounds for the next 1-2 weeks.    Stay hydrated. Take over the counter fiber (metamucil or benefiber) and stool softeners (Miralax, docusate or senna) if becoming constipated.     Call for fever greater than 101.5, chills, increased size of incision, red skin around incision, vision changes, muscle strength changes, sensation changes, shortness of breath, or other concerns.    No driving while taking narcotic pain medication.    Transition to ibuprofen or tylenol/acetaminophen for pain control. Do not take tylenol/acetaminophen and acetaminophen containing narcotic (e.g., percocet or vicodin) at the same time. If you have known ulcer problems, or kidney trouble (elevated creatinine) do not take the ibuprofen.    In emergencies, call 911    For other Questions or Concerns;   A.) During weekday  "working hours (Monday through Friday 8am to 4:30pm)   call [phone] and ask to speak to a thoracic surgery nurse (RN or LPN).     VANCE) At nights (after 4:30pm), on weekends, or if urgent call [phone] and   tell the  \"I would like to page job code [phone], the thoracic surgery   fellow on call, please.\"     Diet   Order Comments: As in discharge instruction section     Order Specific Question Answer Comments   Is discharge order? Yes        DISCHARGE MEDICATIONS:   Current Discharge Medication List        START taking these medications    Details   acetaminophen (TYLENOL) 500 MG tablet Take 2 tablets (1,000 mg) by mouth every 6 hours    Associated Diagnoses: Tracheobronchomalacia      gabapentin (NEURONTIN) 300 MG capsule Take 1 capsule (300 mg) by mouth 2 times daily  Qty: 120 capsule, Refills: 0    Associated Diagnoses: Tracheobronchomalacia      guaiFENesin (MUCINEX) 600 MG 12 hr tablet Take 1 tablet (600 mg) by mouth 2 times daily  Qty: 14 tablet, Refills: 0    Associated Diagnoses: Tracheobronchomalacia      HYDROmorphone (DILAUDID) 4 MG tablet Take 1 tablet (4 mg) by mouth every 4 hours as needed for moderate to severe pain  Qty: 40 tablet, Refills: 0    Associated Diagnoses: Tracheobronchomalacia      ibuprofen (ADVIL/MOTRIN) 600 MG tablet Take 1 tablet (600 mg) by mouth every 6 hours as needed for moderate pain    Associated Diagnoses: Tracheobronchomalacia      methocarbamol (ROBAXIN) 750 MG tablet Take 1 tablet (750 mg) by mouth every 8 hours as needed for muscle spasms  Qty: 30 tablet, Refills: 0    Associated Diagnoses: Tracheobronchomalacia      polyethylene glycol (MIRALAX) 17 GM/Dose powder Take 17 g by mouth daily as needed for constipation  Qty: 510 g, Refills: 0    Associated Diagnoses: Tracheobronchomalacia           CONTINUE these medications which have NOT CHANGED    Details   albuterol (PROAIR HFA/PROVENTIL HFA/VENTOLIN HFA) 108 (90 Base) MCG/ACT inhaler Inhale 2 puffs into " the lungs every 4 hours as needed      atorvastatin (LIPITOR) 40 MG tablet Take 1 tablet by mouth At Bedtime      blood glucose (CONTOUR TEST) test strip Test 6 times daily and as needed.      Glucagon (BAQSIMI ONE PACK) 3 MG/DOSE POWD       insulin aspart (NOVOLOG PEN) 100 UNIT/ML pen Inject Subcutaneous 4 times daily (with meals and nightly) Sliding scale and carb counting      insulin glargine (LANTUS PEN) 100 UNIT/ML pen Inject 35 Units Subcutaneous 2 times daily 35 units in AM and 35 in PM      ramipril (ALTACE) 5 MG capsule Take 1 capsule by mouth at bedtime      sildenafil (REVATIO) 20 MG tablet Take 40-60 mg by mouth daily as needed      tamsulosin (FLOMAX) 0.4 MG capsule Take 0.4 mg by mouth daily

## 2024-05-23 NOTE — PLAN OF CARE
"Goal Outcome Evaluation:      Plan of Care Reviewed With: patient    Overall Patient Progress: improving    Shift 1930-0730  A&Ox4. VSS on RA. Pt c/o pain at chest incision site managed with scheduled gabapentin, Lidocaine patch x1, prn tylenol x1 prn robaxin x1, & po dilaudid x1 with relief. Up ambulating independently in the room. Voiding spontaneously, not saving. +BS, +flatus, +BM overnight 5/23. Pt took shower before bedtime. R chest incision with seri-strips covered with primapore. Old epidural site covered with primapore. Regular diet, ate supper 100%. Bedtime , overnight BG 71 (apple juice given) recheck .   Pt requested sleep aid, pt said that \"melatonin doesn't do anything for him\". Mary Max MD paged @2058, new order placed for ramelteon 8 mg at bedtime, med effective. No acute changes this shift. Possible discharge home today. Continue POC.   "

## 2024-05-23 NOTE — PROGRESS NOTES
THORACIC & FOREGUT SURGERY    S:  Increased pain after epidural removed yesterday. Difficulty with pain control overnight, didn't sleep much. Breathing okay, not coughing due to pain and thinks he has some phlegm building up that he is unable to clear.     O:  Vitals:    05/22/24 2213 05/22/24 2214 05/23/24 0219 05/23/24 0733   BP: (!) 145/85   (!) 144/90   BP Location: Right arm   Right arm   Patient Position: Sitting      Cuff Size: Adult Regular      Pulse:    101   Resp: 17   17   Temp: 98.2  F (36.8  C)   98.6  F (37  C)   TempSrc: Oral   Oral   SpO2: 92% 96% 95% 93%   Weight:       Height:           NAD, appears uncomfortable  Breathing non-labored on room air  Abd soft  Right thoracotomy incision dressing removed- incision with steristrips in place, no signs of infection.     Labs reviewed, no new imaging    A/P: Brendan Helms is a 53 year old male POD#6 s/p Right posterolateral thoracotomy, Tracheobronchoplasty with prolene mesh, Intercostal nerve cryoablation. Overall progressing well, chest tube removed POD4, difficulty with pain control after epidural removal.       - Multimodal pain control, PO only to prepare for discharge home. Added ibuprofen today.   - frequent IS, guaifenesin   -Consistent carbohydrate diet  -Very high sliding scale insulin and carb coverage insulin (1 unit/15 grams)  - DVT ppx: SQH  - Encourage OOB and ambulation as much as possible  - Possible discharge home today if pain better controlled on PO pain meds. Patient would like to go home, discussed importance of having good pain control plan prior to discharge, especially with him living multiple hours away.     Seen with fellow who will discuss with staff    Agustina Castellon MD  Surgery PGY3

## 2024-05-23 NOTE — PLAN OF CARE
Goal Outcome Evaluation:      Plan of Care Reviewed With: patient    Overall Patient Progress: improving    Outcome Evaluation: No acute changes this shift. Patient c/o pain at incisions. PRN pain meds given per orders. Discharge orders placed - awaiting ride.      BP (!) 144/90 (BP Location: Right arm)   Pulse 101   Temp 98.6  F (37  C) (Oral)   Resp 17   Ht 1.829 m (6')   Wt 104.3 kg (230 lb)   SpO2 93%   BMI 31.19 kg/m

## 2024-05-23 NOTE — PLAN OF CARE
Goal Outcome Evaluation:      Plan of Care Reviewed With: patient    Overall Patient Progress: no change    Outcome Evaluation: No acute changes this shift. Epidural removed. C/O pain in chest incision; pain controlled with PO regiment. Patient ambulating independently. PIV SL. X1 BM this shift, voiding - not saving.    BP (!) 150/80 (BP Location: Right arm)   Pulse 98   Temp 98.1  F (36.7  C) (Oral)   Resp 17   Ht 1.829 m (6')   Wt 104.3 kg (230 lb)   SpO2 97%   BMI 31.19 kg/m

## 2024-05-24 ENCOUNTER — TELEPHONE (OUTPATIENT)
Dept: SURGERY | Facility: CLINIC | Age: 54
End: 2024-05-24
Payer: COMMERCIAL

## 2024-05-24 ENCOUNTER — DOCUMENTATION ONLY (OUTPATIENT)
Dept: PHARMACY | Facility: CLINIC | Age: 54
End: 2024-05-24
Payer: COMMERCIAL

## 2024-05-24 ENCOUNTER — TELEPHONE (OUTPATIENT)
Dept: NURSING | Facility: CLINIC | Age: 54
End: 2024-05-24
Payer: COMMERCIAL

## 2024-05-24 ENCOUNTER — PATIENT OUTREACH (OUTPATIENT)
Dept: CARE COORDINATION | Facility: CLINIC | Age: 54
End: 2024-05-24
Payer: COMMERCIAL

## 2024-05-24 DIAGNOSIS — J39.8 TRACHEOBRONCHOMALACIA: Primary | ICD-10-CM

## 2024-05-24 DIAGNOSIS — K21.00 GASTROESOPHAGEAL REFLUX DISEASE WITH ESOPHAGITIS WITHOUT HEMORRHAGE: ICD-10-CM

## 2024-05-24 RX ORDER — ALBUTEROL SULFATE 90 UG/1
2 AEROSOL, METERED RESPIRATORY (INHALATION) EVERY 4 HOURS PRN
Qty: 18 G | Refills: 1 | Status: SHIPPED | OUTPATIENT
Start: 2024-05-24 | End: 2024-08-06

## 2024-05-24 RX ORDER — ALBUTEROL SULFATE 0.83 MG/ML
2.5 SOLUTION RESPIRATORY (INHALATION) EVERY 6 HOURS PRN
Qty: 50 ML | Refills: 1 | Status: SHIPPED | OUTPATIENT
Start: 2024-05-24

## 2024-05-24 RX ORDER — FAMOTIDINE 40 MG/1
40 TABLET, FILM COATED ORAL 2 TIMES DAILY PRN
Qty: 60 TABLET | Refills: 1 | Status: SHIPPED | OUTPATIENT
Start: 2024-05-24

## 2024-05-24 RX ORDER — HYDROMORPHONE HYDROCHLORIDE 4 MG/1
4 TABLET ORAL EVERY 4 HOURS PRN
Qty: 40 TABLET | Refills: 0 | Status: ON HOLD | OUTPATIENT
Start: 2024-05-24 | End: 2024-06-02

## 2024-05-24 RX ORDER — OMEPRAZOLE 10 MG/1
10 CAPSULE, DELAYED RELEASE ORAL DAILY
Qty: 30 CAPSULE | Refills: 0 | Status: SHIPPED | OUTPATIENT
Start: 2024-05-24

## 2024-05-24 NOTE — TELEPHONE ENCOUNTER
THORACIC SURGERY TELEPHONE ENCOUNTER    I called Rigoberto and his wife Corrie after receiving a call from the outpatient pharmacy that Rigoberto had not picked up the PO dilaudid script I wrote for him at discharge because he didn't want to wait for the prior authorization. He did receive some PO dilaudid, but it was a considerably lower dose than his pain was controlled on at discharge.    On the phone Rigoberto was in considerable pain and very uncomfortable. I had a long conversation with him and his spouse Corrie. I will be sending in a new script for PO dilaudid 4mg q4hrs prn qty #40 to his local pharmacy as I'm concerned he would have difficulty getting through the holiday weekend with his current script. We discussed getting back on his pain regimen he was on while inpatient. They both expressed understanding and will keep us informed.     They were also wondering if they needed to continue the nebulizer interventional pulmonology placed them on while his airway stent was in place. I feel he can trial without and if unable to wean can follow up with IP or his PCP for future prescriptions.    He would also like to trial a PPI because Dr. Dumont mentioned chronic GERD could have contributed to his TBM and he does have some reflux symptoms. I will send him in a prescription for 1 month of omeprazole, if he finds benefit from it, I asked that he follow up with his PCP for future refills.     All questions and concerns were answered and addressed. I asked that they please reach out to our team for any further needs.     Rod Livingston PA-C  Thoracic and Foregut Surgery

## 2024-05-24 NOTE — TELEPHONE ENCOUNTER
Writer called and left message on patient's voicemail to call back and speak with a triage nurse.    CALDERON VangN RN  [facility]

## 2024-05-24 NOTE — PROGRESS NOTES
Prior Authorization Approval    Medication: HYDROMORPHONE HCL 4 MG PO TABS  Authorization Effective Date: 5/24/2024  Authorization Expiration Date: 6/24/2024  Approved Dose/Quantity: 4mg tabs / 40 tabs  Reference #:     Insurance Company: [employer] - Phone [phone] Fax [phone]  Expected CoPay: $ 4.11  Which Pharmacy is filling the prescription: [facility] - [place], [place] - [address]  Pharmacy Notified: Yes              Teresa Xavier  [facility] Pharmacy Liaison  Phone [phone]  Fax [phone]  Available on Teams & Zandra

## 2024-05-24 NOTE — TELEPHONE ENCOUNTER
PCP has left practice and this patient appears to be living a long way away from the old PCP's clinic.  Covering MD is willing to refill med reflux and albuterol for now, but he needs to establish care with new PCP to get pain meds.

## 2024-05-24 NOTE — TELEPHONE ENCOUNTER
Caller:   Wife    Situation:   Returning call to clinic. She said they missed a call from the clinic.    Wife was informed of the message from the clinic. She says it was taken care of by surgery staff.      Clinic left message: PCP has left practice and this patient appears to be living a long way away from the old PCP's clinic.  Covering MD is willing to refill med reflux and albuterol for now, but he needs to establish care with new PCP to get pain meds.      Assessment:  No prescription needed    No further action needed by clinic.          Raman Gardiner RN, BSN  Triage Nurse Advisor

## 2024-05-24 NOTE — PROGRESS NOTES
"Clinic Care Coordination Contact  Transitions of Care Outreach  Chief Complaint   Patient presents with    Clinic Care Coordination - Post Hospital       Most Recent Admission Date: 5/17/2024   Most Recent Admission Diagnosis: Tracheobronchomalacia - J39.8     Most Recent Discharge Date: 5/23/2024   Most Recent Discharge Diagnosis: Tracheobronchomalacia - J39.8     Transitions of Care Assessment    Discharge Assessment  How are you doing now that you are home?: \" Doing \"  How are your symptoms? (Red Flag symptoms escalate to triage hotline per guidelines): Improved  Do you know how to contact your clinic care team if you have future questions or changes to your health status? : Yes  Does the patient have their discharge instructions? : Yes  Does the patient have questions regarding their discharge instructions? : No  Were you started on any new medications or were there changes to any of your previous medications? : Yes  Does the patient have all of their medications?: Yes  Do you have questions regarding any of your medications? : No  Do you have all of your needed medical supplies or equipment (DME)?  (i.e. oxygen tank, CPAP, cane, etc.): Yes    Post-op ([facility] CTA Only)  If the patient had a surgery or procedure, do they have any questions for a nurse?: No         [facility] Explained and offered Care Coordination support to eligible patients: Yes    Patient accepted? No    Follow up Plan     Discharge Follow-Up  Discharge follow up appointment scheduled in alignment with recommended follow up timeframe or Transitions of Risk Category? (Low = within 30 days; Moderate= within 14 days; High= within 7 days): Yes  Discharge Follow Up Appointment Date: 06/12/24  Discharge Follow Up Appointment Scheduled with?: Specialty Care Provider    Future Appointments   Date Time Provider Department Center   6/12/2024 10:15 AM [id number] [facility]   6/12/2024 11:00 AM Lisa Gandara MD [facility]       Outpatient Plan as " outlined on AVS reviewed with patient.    For any urgent concerns, please contact our 24 hour nurse triage line: [phone] ([phone])       Almaz Babcock MA

## 2024-05-28 NOTE — TELEPHONE ENCOUNTER
"Writer attempt #2 to call patient regarding message below. This encounter is quite confusing as, patient's listed PCP is a non [facility] Provider. All previous office visits are with \"[facility].\"    If patient calls back, please relay Dr. Johansen's message to patient. Thanks.    CALDERON SortoN, RN   [facility]      "

## 2024-05-29 ENCOUNTER — NURSE TRIAGE (OUTPATIENT)
Dept: ONCOLOGY | Facility: CLINIC | Age: 54
End: 2024-05-29
Payer: COMMERCIAL

## 2024-05-29 ENCOUNTER — APPOINTMENT (OUTPATIENT)
Dept: GENERAL RADIOLOGY | Facility: CLINIC | Age: 54
DRG: 187 | End: 2024-05-29
Attending: EMERGENCY MEDICINE
Payer: COMMERCIAL

## 2024-05-29 ENCOUNTER — HOSPITAL ENCOUNTER (INPATIENT)
Facility: CLINIC | Age: 54
LOS: 4 days | Discharge: HOME | DRG: 187 | End: 2024-06-02
Attending: EMERGENCY MEDICINE | Admitting: THORACIC SURGERY (CARDIOTHORACIC VASCULAR SURGERY)
Payer: COMMERCIAL

## 2024-05-29 DIAGNOSIS — J39.8 TRACHEOMALACIA: Primary | ICD-10-CM

## 2024-05-29 DIAGNOSIS — J39.8 TRACHEOBRONCHOMALACIA: ICD-10-CM

## 2024-05-29 DIAGNOSIS — J90 PLEURAL EFFUSION ON RIGHT: ICD-10-CM

## 2024-05-29 LAB
ALBUMIN SERPL BCG-MCNC: 3.5 G/DL (ref 3.5–5.2)
ALP SERPL-CCNC: 123 U/L (ref 40–150)
ALT SERPL W P-5'-P-CCNC: 26 U/L (ref 0–70)
ANION GAP SERPL CALCULATED.3IONS-SCNC: 11 MMOL/L (ref 7–15)
AST SERPL W P-5'-P-CCNC: 30 U/L (ref 0–45)
BASOPHILS # BLD AUTO: 0.1 10E3/UL (ref 0–0.2)
BASOPHILS NFR BLD AUTO: 1 %
BILIRUB SERPL-MCNC: 0.6 MG/DL
BUN SERPL-MCNC: 12 MG/DL (ref 6–20)
CALCIUM SERPL-MCNC: 9 MG/DL (ref 8.6–10)
CHLORIDE SERPL-SCNC: 99 MMOL/L (ref 98–107)
CREAT SERPL-MCNC: 0.81 MG/DL (ref 0.67–1.17)
DEPRECATED HCO3 PLAS-SCNC: 23 MMOL/L (ref 22–29)
EGFRCR SERPLBLD CKD-EPI 2021: >90 ML/MIN/1.73M2
EOSINOPHIL # BLD AUTO: 0.4 10E3/UL (ref 0–0.7)
EOSINOPHIL NFR BLD AUTO: 4 %
ERYTHROCYTE [DISTWIDTH] IN BLOOD BY AUTOMATED COUNT: 13.5 % (ref 10–15)
GLUCOSE BLDC GLUCOMTR-MCNC: 94 MG/DL (ref 70–99)
GLUCOSE SERPL-MCNC: 96 MG/DL (ref 70–99)
HCT VFR BLD AUTO: 38.4 % (ref 40–53)
HGB BLD-MCNC: 12.8 G/DL (ref 13.3–17.7)
HOLD SPECIMEN: NORMAL
HOLD SPECIMEN: NORMAL
IMM GRANULOCYTES # BLD: 0.1 10E3/UL
IMM GRANULOCYTES NFR BLD: 1 %
LYMPHOCYTES # BLD AUTO: 0.8 10E3/UL (ref 0.8–5.3)
LYMPHOCYTES NFR BLD AUTO: 9 %
MCH RBC QN AUTO: 28.8 PG (ref 26.5–33)
MCHC RBC AUTO-ENTMCNC: 33.3 G/DL (ref 31.5–36.5)
MCV RBC AUTO: 87 FL (ref 78–100)
MONOCYTES # BLD AUTO: 0.6 10E3/UL (ref 0–1.3)
MONOCYTES NFR BLD AUTO: 6 %
NEUTROPHILS # BLD AUTO: 7.5 10E3/UL (ref 1.6–8.3)
NEUTROPHILS NFR BLD AUTO: 79 %
NRBC # BLD AUTO: 0 10E3/UL
NRBC BLD AUTO-RTO: 0 /100
NT-PROBNP SERPL-MCNC: <36 PG/ML (ref 0–900)
PLATELET # BLD AUTO: 503 10E3/UL (ref 150–450)
POTASSIUM SERPL-SCNC: 4.9 MMOL/L (ref 3.4–5.3)
PROT SERPL-MCNC: 6.9 G/DL (ref 6.4–8.3)
RBC # BLD AUTO: 4.44 10E6/UL (ref 4.4–5.9)
SODIUM SERPL-SCNC: 133 MMOL/L (ref 135–145)
WBC # BLD AUTO: 9.4 10E3/UL (ref 4–11)

## 2024-05-29 PROCEDURE — 80053 COMPREHEN METABOLIC PANEL: CPT | Performed by: EMERGENCY MEDICINE

## 2024-05-29 PROCEDURE — 71046 X-RAY EXAM CHEST 2 VIEWS: CPT

## 2024-05-29 PROCEDURE — 82962 GLUCOSE BLOOD TEST: CPT

## 2024-05-29 PROCEDURE — 250N000011 HC RX IP 250 OP 636: Performed by: EMERGENCY MEDICINE

## 2024-05-29 PROCEDURE — 258N000003 HC RX IP 258 OP 636: Performed by: EMERGENCY MEDICINE

## 2024-05-29 PROCEDURE — 96361 HYDRATE IV INFUSION ADD-ON: CPT | Performed by: EMERGENCY MEDICINE

## 2024-05-29 PROCEDURE — 36415 COLL VENOUS BLD VENIPUNCTURE: CPT | Performed by: EMERGENCY MEDICINE

## 2024-05-29 PROCEDURE — 99285 EMERGENCY DEPT VISIT HI MDM: CPT | Mod: 25 | Performed by: EMERGENCY MEDICINE

## 2024-05-29 PROCEDURE — 85004 AUTOMATED DIFF WBC COUNT: CPT | Performed by: EMERGENCY MEDICINE

## 2024-05-29 PROCEDURE — 99285 EMERGENCY DEPT VISIT HI MDM: CPT | Performed by: EMERGENCY MEDICINE

## 2024-05-29 PROCEDURE — 96374 THER/PROPH/DIAG INJ IV PUSH: CPT | Performed by: EMERGENCY MEDICINE

## 2024-05-29 PROCEDURE — 83880 ASSAY OF NATRIURETIC PEPTIDE: CPT | Performed by: EMERGENCY MEDICINE

## 2024-05-29 PROCEDURE — 71046 X-RAY EXAM CHEST 2 VIEWS: CPT | Mod: 26 | Performed by: RADIOLOGY

## 2024-05-29 PROCEDURE — 120N000002 HC R&B MED SURG/OB UMMC

## 2024-05-29 RX ORDER — TAMSULOSIN HYDROCHLORIDE 0.4 MG/1
0.4 CAPSULE ORAL DAILY
Status: DISCONTINUED | OUTPATIENT
Start: 2024-05-30 | End: 2024-06-02 | Stop reason: HOSPADM

## 2024-05-29 RX ORDER — HYDROMORPHONE HYDROCHLORIDE 1 MG/ML
0.5 INJECTION, SOLUTION INTRAMUSCULAR; INTRAVENOUS; SUBCUTANEOUS
Status: COMPLETED | OUTPATIENT
Start: 2024-05-29 | End: 2024-05-29

## 2024-05-29 RX ORDER — FUROSEMIDE 10 MG/ML
40 INJECTION INTRAMUSCULAR; INTRAVENOUS ONCE
Qty: 4 ML | Refills: 0 | Status: COMPLETED | OUTPATIENT
Start: 2024-05-30 | End: 2024-05-30

## 2024-05-29 RX ORDER — METHOCARBAMOL 500 MG/1
500 TABLET, FILM COATED ORAL 4 TIMES DAILY
Status: DISCONTINUED | OUTPATIENT
Start: 2024-05-30 | End: 2024-05-30

## 2024-05-29 RX ORDER — ALBUTEROL SULFATE 90 UG/1
2 AEROSOL, METERED RESPIRATORY (INHALATION) EVERY 6 HOURS PRN
Status: DISCONTINUED | OUTPATIENT
Start: 2024-05-29 | End: 2024-06-02 | Stop reason: HOSPADM

## 2024-05-29 RX ADMIN — HYDROMORPHONE HYDROCHLORIDE 0.5 MG: 1 INJECTION, SOLUTION INTRAMUSCULAR; INTRAVENOUS; SUBCUTANEOUS at 21:36

## 2024-05-29 RX ADMIN — SODIUM CHLORIDE 500 ML: 9 INJECTION, SOLUTION INTRAVENOUS at 21:37

## 2024-05-29 ASSESSMENT — ACTIVITIES OF DAILY LIVING (ADL)
ADLS_ACUITY_SCORE: 35
ADLS_ACUITY_SCORE: 33
ADLS_ACUITY_SCORE: 35

## 2024-05-29 ASSESSMENT — COLUMBIA-SUICIDE SEVERITY RATING SCALE - C-SSRS
1. IN THE PAST MONTH, HAVE YOU WISHED YOU WERE DEAD OR WISHED YOU COULD GO TO SLEEP AND NOT WAKE UP?: NO
2. HAVE YOU ACTUALLY HAD ANY THOUGHTS OF KILLING YOURSELF IN THE PAST MONTH?: NO
6. HAVE YOU EVER DONE ANYTHING, STARTED TO DO ANYTHING, OR PREPARED TO DO ANYTHING TO END YOUR LIFE?: NO

## 2024-05-29 NOTE — TELEPHONE ENCOUNTER
"Oncology Nurse Triage    Situation:   Brendan reporting the following symptoms:  - \"can't catch breath\"  -shortness of breath  -intermittent new chest pain in front on sternum  -continues to have old chest pain from surgery    Background:   Treating Provider:   Dr. Dumont    Date of last office visit: surgery done on 5/17/2024    Recent Treatments:Thoracotomy, Tracheobronchoplasty, Intercostal Nerve Cryoablation, Flexible Bronchoscopy     On 5/20/2024 also got bronchoscopy from Dr. Desai    Assessment:     Onset: yesterday 5/28/2024  Pt was in new recliner post surgery and could breathe and catch breath. Then since yesterday 5/28 did some daily activity around the house like possible feed the dog, but not sure if what activity, and since yesterday hasn't been able to catch breath anymore.     Pt is concerned that the medications pt is on could be causing SOB  Pt stopped taking the following medications at home with last doses being yesterday:   Stool softeners  Laxatives  Muscle relaxant/Methocarbimol    Pt is still taking Dilaudid, IBUprofen, Tylenol.     Pt states is \"bloated like a hot air balloon for past 3-4 days\".     Chest Pain rates 5/10.     Denies fever, cough, dizziness, fainting, headache    Pt [place] [facility] [place](near [place])    Recommendations:   1008 Per Thoracic care team, Pt needs to go to local ED to get evaluated in person to assess for acute etiology.     1010 This writer updated Leydi to go to local ED. Corrie verbalized understanding, they may call an ambulance so they an get into local ED faster.      "

## 2024-05-30 ENCOUNTER — APPOINTMENT (OUTPATIENT)
Dept: GENERAL RADIOLOGY | Facility: CLINIC | Age: 54
DRG: 187 | End: 2024-05-30
Payer: COMMERCIAL

## 2024-05-30 ENCOUNTER — APPOINTMENT (OUTPATIENT)
Dept: INTERVENTIONAL RADIOLOGY/VASCULAR | Facility: CLINIC | Age: 54
DRG: 187 | End: 2024-05-30
Attending: PHYSICIAN ASSISTANT
Payer: COMMERCIAL

## 2024-05-30 ENCOUNTER — APPOINTMENT (OUTPATIENT)
Dept: CT IMAGING | Facility: CLINIC | Age: 54
DRG: 187 | End: 2024-05-30
Attending: PHYSICIAN ASSISTANT
Payer: COMMERCIAL

## 2024-05-30 LAB
ANION GAP SERPL CALCULATED.3IONS-SCNC: 7 MMOL/L (ref 7–15)
BUN SERPL-MCNC: 11.8 MG/DL (ref 6–20)
CALCIUM SERPL-MCNC: 8.6 MG/DL (ref 8.6–10)
CHLORIDE SERPL-SCNC: 101 MMOL/L (ref 98–107)
CREAT SERPL-MCNC: 0.82 MG/DL (ref 0.67–1.17)
DEPRECATED HCO3 PLAS-SCNC: 26 MMOL/L (ref 22–29)
EGFRCR SERPLBLD CKD-EPI 2021: >90 ML/MIN/1.73M2
ERYTHROCYTE [DISTWIDTH] IN BLOOD BY AUTOMATED COUNT: 13.4 % (ref 10–15)
GLUCOSE BLDC GLUCOMTR-MCNC: 105 MG/DL (ref 70–99)
GLUCOSE BLDC GLUCOMTR-MCNC: 147 MG/DL (ref 70–99)
GLUCOSE BLDC GLUCOMTR-MCNC: 149 MG/DL (ref 70–99)
GLUCOSE BLDC GLUCOMTR-MCNC: 198 MG/DL (ref 70–99)
GLUCOSE BLDC GLUCOMTR-MCNC: 291 MG/DL (ref 70–99)
GLUCOSE BLDC GLUCOMTR-MCNC: 51 MG/DL (ref 70–99)
GLUCOSE BLDC GLUCOMTR-MCNC: 59 MG/DL (ref 70–99)
GLUCOSE BLDC GLUCOMTR-MCNC: 60 MG/DL (ref 70–99)
GLUCOSE BLDC GLUCOMTR-MCNC: 60 MG/DL (ref 70–99)
GLUCOSE BLDC GLUCOMTR-MCNC: 75 MG/DL (ref 70–99)
GLUCOSE BLDC GLUCOMTR-MCNC: 90 MG/DL (ref 70–99)
GLUCOSE SERPL-MCNC: 144 MG/DL (ref 70–99)
HBA1C MFR BLD: 6.6 %
HCT VFR BLD AUTO: 34.2 % (ref 40–53)
HGB BLD-MCNC: 11.3 G/DL (ref 13.3–17.7)
MCH RBC QN AUTO: 28.7 PG (ref 26.5–33)
MCHC RBC AUTO-ENTMCNC: 33 G/DL (ref 31.5–36.5)
MCV RBC AUTO: 87 FL (ref 78–100)
PLATELET # BLD AUTO: 452 10E3/UL (ref 150–450)
POTASSIUM SERPL-SCNC: 4.7 MMOL/L (ref 3.4–5.3)
RBC # BLD AUTO: 3.94 10E6/UL (ref 4.4–5.9)
SODIUM SERPL-SCNC: 134 MMOL/L (ref 135–145)
WBC # BLD AUTO: 8.2 10E3/UL (ref 4–11)

## 2024-05-30 PROCEDURE — 250N000011 HC RX IP 250 OP 636

## 2024-05-30 PROCEDURE — 84478 ASSAY OF TRIGLYCERIDES: CPT

## 2024-05-30 PROCEDURE — 32557 INSERT CATH PLEURA W/ IMAGE: CPT | Mod: RT | Performed by: RADIOLOGY

## 2024-05-30 PROCEDURE — 82664 ELECTROPHORETIC TEST: CPT

## 2024-05-30 PROCEDURE — 71045 X-RAY EXAM CHEST 1 VIEW: CPT | Mod: 26 | Performed by: RADIOLOGY

## 2024-05-30 PROCEDURE — 71250 CT THORAX DX C-: CPT

## 2024-05-30 PROCEDURE — 87075 CULTR BACTERIA EXCEPT BLOOD: CPT

## 2024-05-30 PROCEDURE — 36415 COLL VENOUS BLD VENIPUNCTURE: CPT

## 2024-05-30 PROCEDURE — 250N000012 HC RX MED GY IP 250 OP 636 PS 637

## 2024-05-30 PROCEDURE — 258N000003 HC RX IP 258 OP 636: Performed by: STUDENT IN AN ORGANIZED HEALTH CARE EDUCATION/TRAINING PROGRAM

## 2024-05-30 PROCEDURE — 250N000011 HC RX IP 250 OP 636: Performed by: STUDENT IN AN ORGANIZED HEALTH CARE EDUCATION/TRAINING PROGRAM

## 2024-05-30 PROCEDURE — 87102 FUNGUS ISOLATION CULTURE: CPT

## 2024-05-30 PROCEDURE — 80048 BASIC METABOLIC PNL TOTAL CA: CPT

## 2024-05-30 PROCEDURE — 250N000013 HC RX MED GY IP 250 OP 250 PS 637

## 2024-05-30 PROCEDURE — 120N000002 HC R&B MED SURG/OB UMMC

## 2024-05-30 PROCEDURE — C1729 CATH, DRAINAGE: HCPCS

## 2024-05-30 PROCEDURE — 83036 HEMOGLOBIN GLYCOSYLATED A1C: CPT

## 2024-05-30 PROCEDURE — 82962 GLUCOSE BLOOD TEST: CPT

## 2024-05-30 PROCEDURE — S5010 5% DEXTROSE AND 0.45% SALINE: HCPCS | Performed by: STUDENT IN AN ORGANIZED HEALTH CARE EDUCATION/TRAINING PROGRAM

## 2024-05-30 PROCEDURE — 71045 X-RAY EXAM CHEST 1 VIEW: CPT

## 2024-05-30 PROCEDURE — 87205 SMEAR GRAM STAIN: CPT

## 2024-05-30 PROCEDURE — 85014 HEMATOCRIT: CPT

## 2024-05-30 PROCEDURE — 71250 CT THORAX DX C-: CPT | Mod: 26 | Performed by: RADIOLOGY

## 2024-05-30 RX ORDER — HYDROMORPHONE HCL IN WATER/PF 6 MG/30 ML
0.2 PATIENT CONTROLLED ANALGESIA SYRINGE INTRAVENOUS
Status: DISCONTINUED | OUTPATIENT
Start: 2024-05-30 | End: 2024-06-02

## 2024-05-30 RX ORDER — HYDROMORPHONE HCL IN WATER/PF 6 MG/30 ML
0.4 PATIENT CONTROLLED ANALGESIA SYRINGE INTRAVENOUS
Status: DISCONTINUED | OUTPATIENT
Start: 2024-05-30 | End: 2024-06-02

## 2024-05-30 RX ORDER — OXYCODONE HYDROCHLORIDE 5 MG/1
5 TABLET ORAL EVERY 4 HOURS PRN
Status: DISCONTINUED | OUTPATIENT
Start: 2024-05-30 | End: 2024-06-02 | Stop reason: HOSPADM

## 2024-05-30 RX ORDER — LIDOCAINE 40 MG/G
CREAM TOPICAL
Status: DISCONTINUED | OUTPATIENT
Start: 2024-05-30 | End: 2024-06-02 | Stop reason: HOSPADM

## 2024-05-30 RX ORDER — METHOCARBAMOL 750 MG/1
750 TABLET, FILM COATED ORAL 4 TIMES DAILY
Status: DISCONTINUED | OUTPATIENT
Start: 2024-05-30 | End: 2024-05-30

## 2024-05-30 RX ORDER — POLYETHYLENE GLYCOL 3350 17 G/17G
17 POWDER, FOR SOLUTION ORAL DAILY
Status: DISCONTINUED | OUTPATIENT
Start: 2024-05-30 | End: 2024-06-02 | Stop reason: HOSPADM

## 2024-05-30 RX ORDER — OXYCODONE HYDROCHLORIDE 10 MG/1
10 TABLET ORAL EVERY 4 HOURS PRN
Status: DISCONTINUED | OUTPATIENT
Start: 2024-05-30 | End: 2024-06-02 | Stop reason: HOSPADM

## 2024-05-30 RX ORDER — KETOROLAC TROMETHAMINE 15 MG/ML
15 INJECTION, SOLUTION INTRAMUSCULAR; INTRAVENOUS EVERY 6 HOURS PRN
Status: DISCONTINUED | OUTPATIENT
Start: 2024-05-30 | End: 2024-06-02 | Stop reason: HOSPADM

## 2024-05-30 RX ORDER — BISACODYL 10 MG
10 SUPPOSITORY, RECTAL RECTAL DAILY PRN
Status: DISCONTINUED | OUTPATIENT
Start: 2024-05-30 | End: 2024-06-02 | Stop reason: HOSPADM

## 2024-05-30 RX ORDER — HYDROXYZINE HYDROCHLORIDE 25 MG/1
25 TABLET, FILM COATED ORAL EVERY 6 HOURS PRN
Status: DISCONTINUED | OUTPATIENT
Start: 2024-05-30 | End: 2024-06-02 | Stop reason: HOSPADM

## 2024-05-30 RX ORDER — HYDROXYZINE HYDROCHLORIDE 25 MG/1
50 TABLET, FILM COATED ORAL EVERY 6 HOURS PRN
Status: DISCONTINUED | OUTPATIENT
Start: 2024-05-30 | End: 2024-06-02 | Stop reason: HOSPADM

## 2024-05-30 RX ORDER — PANTOPRAZOLE SODIUM 20 MG/1
20 TABLET, DELAYED RELEASE ORAL
Status: DISCONTINUED | OUTPATIENT
Start: 2024-05-30 | End: 2024-06-02 | Stop reason: HOSPADM

## 2024-05-30 RX ORDER — AMOXICILLIN 250 MG
2 CAPSULE ORAL 2 TIMES DAILY
Status: DISCONTINUED | OUTPATIENT
Start: 2024-05-30 | End: 2024-06-02 | Stop reason: HOSPADM

## 2024-05-30 RX ORDER — PROCHLORPERAZINE 25 MG
25 SUPPOSITORY, RECTAL RECTAL EVERY 12 HOURS PRN
Status: DISCONTINUED | OUTPATIENT
Start: 2024-05-30 | End: 2024-06-02 | Stop reason: HOSPADM

## 2024-05-30 RX ORDER — POLYETHYLENE GLYCOL 3350 17 G/17G
17 POWDER, FOR SOLUTION ORAL DAILY
Status: DISCONTINUED | OUTPATIENT
Start: 2024-05-30 | End: 2024-05-30

## 2024-05-30 RX ORDER — SENNOSIDES 8.6 MG
8.6 TABLET ORAL 2 TIMES DAILY
Status: DISCONTINUED | OUTPATIENT
Start: 2024-05-30 | End: 2024-05-30

## 2024-05-30 RX ORDER — ONDANSETRON 2 MG/ML
4 INJECTION INTRAMUSCULAR; INTRAVENOUS EVERY 6 HOURS PRN
Status: DISCONTINUED | OUTPATIENT
Start: 2024-05-30 | End: 2024-06-02 | Stop reason: HOSPADM

## 2024-05-30 RX ORDER — NICOTINE POLACRILEX 4 MG
15-30 LOZENGE BUCCAL
Status: DISCONTINUED | OUTPATIENT
Start: 2024-05-30 | End: 2024-06-02 | Stop reason: HOSPADM

## 2024-05-30 RX ORDER — ONDANSETRON 4 MG/1
4 TABLET, ORALLY DISINTEGRATING ORAL EVERY 6 HOURS PRN
Status: DISCONTINUED | OUTPATIENT
Start: 2024-05-30 | End: 2024-06-02 | Stop reason: HOSPADM

## 2024-05-30 RX ORDER — DEXTROSE MONOHYDRATE 25 G/50ML
25-50 INJECTION, SOLUTION INTRAVENOUS
Status: DISCONTINUED | OUTPATIENT
Start: 2024-05-30 | End: 2024-06-02 | Stop reason: HOSPADM

## 2024-05-30 RX ORDER — PROCHLORPERAZINE MALEATE 5 MG
10 TABLET ORAL EVERY 6 HOURS PRN
Status: DISCONTINUED | OUTPATIENT
Start: 2024-05-30 | End: 2024-06-02 | Stop reason: HOSPADM

## 2024-05-30 RX ORDER — METHOCARBAMOL 750 MG/1
750 TABLET, FILM COATED ORAL 4 TIMES DAILY PRN
Status: DISCONTINUED | OUTPATIENT
Start: 2024-05-30 | End: 2024-06-02 | Stop reason: HOSPADM

## 2024-05-30 RX ORDER — DEXTROSE MONOHYDRATE AND SODIUM CHLORIDE 5; .45 G/100ML; G/100ML
INJECTION, SOLUTION INTRAVENOUS CONTINUOUS
Status: DISCONTINUED | OUTPATIENT
Start: 2024-05-30 | End: 2024-05-31

## 2024-05-30 RX ORDER — FENTANYL CITRATE 50 UG/ML
25-50 INJECTION, SOLUTION INTRAMUSCULAR; INTRAVENOUS ONCE
Status: COMPLETED | OUTPATIENT
Start: 2024-05-30 | End: 2024-05-30

## 2024-05-30 RX ORDER — ONDANSETRON 4 MG/1
4 TABLET, ORALLY DISINTEGRATING ORAL EVERY 6 HOURS PRN
Status: DISCONTINUED | OUTPATIENT
Start: 2024-05-30 | End: 2024-05-30

## 2024-05-30 RX ORDER — ONDANSETRON 2 MG/ML
4 INJECTION INTRAMUSCULAR; INTRAVENOUS EVERY 6 HOURS PRN
Status: DISCONTINUED | OUTPATIENT
Start: 2024-05-30 | End: 2024-05-30

## 2024-05-30 RX ORDER — AMOXICILLIN 250 MG
1 CAPSULE ORAL 2 TIMES DAILY
Status: DISCONTINUED | OUTPATIENT
Start: 2024-05-30 | End: 2024-06-02 | Stop reason: HOSPADM

## 2024-05-30 RX ORDER — ACETAMINOPHEN 325 MG/1
975 TABLET ORAL EVERY 6 HOURS
Status: DISCONTINUED | OUTPATIENT
Start: 2024-05-30 | End: 2024-06-02 | Stop reason: HOSPADM

## 2024-05-30 RX ADMIN — ACETAMINOPHEN 975 MG: 325 TABLET, FILM COATED ORAL at 19:31

## 2024-05-30 RX ADMIN — INSULIN GLARGINE 17 UNITS: 100 INJECTION, SOLUTION SUBCUTANEOUS at 00:13

## 2024-05-30 RX ADMIN — HYDROMORPHONE HYDROCHLORIDE 0.2 MG: 0.2 INJECTION, SOLUTION INTRAMUSCULAR; INTRAVENOUS; SUBCUTANEOUS at 22:07

## 2024-05-30 RX ADMIN — KETOROLAC TROMETHAMINE 15 MG: 15 INJECTION, SOLUTION INTRAMUSCULAR; INTRAVENOUS at 18:08

## 2024-05-30 RX ADMIN — HYDROMORPHONE HYDROCHLORIDE 0.4 MG: 0.2 INJECTION, SOLUTION INTRAMUSCULAR; INTRAVENOUS; SUBCUTANEOUS at 00:22

## 2024-05-30 RX ADMIN — FENTANYL CITRATE 50 MCG: 50 INJECTION, SOLUTION INTRAMUSCULAR; INTRAVENOUS at 10:53

## 2024-05-30 RX ADMIN — OXYCODONE HYDROCHLORIDE 5 MG: 5 TABLET ORAL at 12:46

## 2024-05-30 RX ADMIN — TAMSULOSIN HYDROCHLORIDE 0.4 MG: 0.4 CAPSULE ORAL at 07:56

## 2024-05-30 RX ADMIN — OXYCODONE HYDROCHLORIDE 5 MG: 5 TABLET ORAL at 16:45

## 2024-05-30 RX ADMIN — DEXTROSE AND SODIUM CHLORIDE: 5; 450 INJECTION, SOLUTION INTRAVENOUS at 19:31

## 2024-05-30 RX ADMIN — DOCUSATE SODIUM 50 MG AND SENNOSIDES 8.6 MG 2 TABLET: 8.6; 5 TABLET, FILM COATED ORAL at 19:31

## 2024-05-30 RX ADMIN — HYDROMORPHONE HYDROCHLORIDE 0.4 MG: 0.2 INJECTION, SOLUTION INTRAMUSCULAR; INTRAVENOUS; SUBCUTANEOUS at 05:25

## 2024-05-30 RX ADMIN — INSULIN GLARGINE 17 UNITS: 100 INJECTION, SOLUTION SUBCUTANEOUS at 19:34

## 2024-05-30 RX ADMIN — METHOCARBAMOL 750 MG: 750 TABLET ORAL at 18:08

## 2024-05-30 RX ADMIN — ACETAMINOPHEN 975 MG: 325 TABLET, FILM COATED ORAL at 00:21

## 2024-05-30 RX ADMIN — HYDROMORPHONE HYDROCHLORIDE 0.2 MG: 0.2 INJECTION, SOLUTION INTRAMUSCULAR; INTRAVENOUS; SUBCUTANEOUS at 14:18

## 2024-05-30 RX ADMIN — OXYCODONE HYDROCHLORIDE 10 MG: 10 TABLET ORAL at 06:33

## 2024-05-30 RX ADMIN — INSULIN ASPART 3 UNITS: 100 INJECTION, SOLUTION INTRAVENOUS; SUBCUTANEOUS at 14:13

## 2024-05-30 RX ADMIN — HYDROMORPHONE HYDROCHLORIDE 0.4 MG: 0.2 INJECTION, SOLUTION INTRAMUSCULAR; INTRAVENOUS; SUBCUTANEOUS at 10:34

## 2024-05-30 RX ADMIN — METHOCARBAMOL 500 MG: 500 TABLET ORAL at 07:56

## 2024-05-30 RX ADMIN — HYDROMORPHONE HYDROCHLORIDE 0.4 MG: 0.2 INJECTION, SOLUTION INTRAMUSCULAR; INTRAVENOUS; SUBCUTANEOUS at 12:18

## 2024-05-30 RX ADMIN — INSULIN GLARGINE 17 UNITS: 100 INJECTION, SOLUTION SUBCUTANEOUS at 14:12

## 2024-05-30 RX ADMIN — METHOCARBAMOL 500 MG: 500 TABLET ORAL at 11:36

## 2024-05-30 RX ADMIN — ACETAMINOPHEN 975 MG: 325 TABLET, FILM COATED ORAL at 05:43

## 2024-05-30 RX ADMIN — FUROSEMIDE 40 MG: 10 INJECTION, SOLUTION INTRAVENOUS at 00:16

## 2024-05-30 RX ADMIN — INSULIN ASPART 7 UNITS: 100 INJECTION, SOLUTION INTRAVENOUS; SUBCUTANEOUS at 18:04

## 2024-05-30 RX ADMIN — DEXTROSE AND SODIUM CHLORIDE: 5; 450 INJECTION, SOLUTION INTRAVENOUS at 06:42

## 2024-05-30 RX ADMIN — ACETAMINOPHEN 975 MG: 325 TABLET, FILM COATED ORAL at 12:31

## 2024-05-30 NOTE — ED NOTES
Thoracic surgery provider asked writer to give oxycodone now and monitor if pt's pain improves, hold off on pca and continue to give oxycodone per scheduled time.Hence PCA not started .

## 2024-05-30 NOTE — ED PROVIDER NOTES
ED Provider Note  [facility]      History     Chief Complaint   Patient presents with    Shortness of Breath     HPI  Brendan Helms is a 53 year old male PMH of DM1, HLD, insulin use, tracheobronchomalacia s/p thoracotomy (5/17/24) who presents to the ER for evaluation of SOB.    Patient is 12 days post op from a R thoracotomy for tracheobronchomalacia and states that prior to yesterday he was doing okay post operatively. He started having worsening of his SOB yesterday and it became severe today morning. He denies fever. He is not on o2 at home. He was seen in the [place] ER earlier today and had CT and XR images that were concerning for a pleural effusion. He had no other lab abnormalities. He took an oral dilaudid at home around 6:30 prior to departing his home. He endorses severe back pain, states he was given nerve blockers and that his entire chest cavity should be numb but he is in severe pain. Dr. Dumont recommended he be sent here for intervention by EMS, there were no ambulances available and patient's wife drove him to [facility] in a personal vehicle.        Past Medical History  Past Medical History:   Diagnosis Date    Diabetes, type I     HLD (hyperlipidemia)     Tracheobronchomalacia      Past Surgical History:   Procedure Laterality Date    BRONCHOSCOPY (RIGID OR FLEXIBLE), DIAGNOSTIC N/A 7/13/2023    Procedure: BRONCHOSCOPY, WITH BRONCHOALVEOLAR LAVAGE;  Surgeon: Rod Dseai MD;  Location: [facility]    BRONCHOSCOPY FLEXIBLE AND RIGID N/A 5/20/2024    Procedure: Bronchoscopy flexible and rigid;  Surgeon: Rod Desai MD;  Location: [facility]    BRONCHOSCOPY, DILATE BRONCHUS, STENT BRONCHUS, COMBINED N/A 4/5/2024    Procedure: BRONCHOSCOPY, RIGID, flexible bronchoscopy , stent placement;  Surgeon: Rod Desai MD;  Location: [facility]    BRONCHOSCOPY, DILATE BRONCHUS, STENT BRONCHUS, COMBINED N/A 4/19/2024    Procedure: BRONCHOSCOPY, RIGID,  flexible bronchoscopy , stent  removal, cryoprobe tissue debulking;  Surgeon: Ruthy George MD;  Location: [facility]    THORACOTOMY Right 5/17/2024    Procedure: Thoracotomy, Tracheobronchoplasty, Intercostal Nerve Cryoablation, Flexible Bronchoscopy;  Surgeon: Lisa Gandara MD;  Location: [facility]     acetaminophen (TYLENOL) 500 MG tablet  albuterol (PROAIR HFA/PROVENTIL HFA/VENTOLIN HFA) 108 (90 Base) MCG/ACT inhaler  albuterol (PROVENTIL) (2.5 MG/3ML) 0.083% neb solution  atorvastatin (LIPITOR) 40 MG tablet  blood glucose (CONTOUR TEST) test strip  famotidine (PEPCID) 40 MG tablet  gabapentin (NEURONTIN) 300 MG capsule  Glucagon (BAQSIMI ONE PACK) 3 MG/DOSE POWD  guaiFENesin (MUCINEX) 600 MG 12 hr tablet  HYDROmorphone (DILAUDID) 4 MG tablet  HYDROmorphone (DILAUDID) 4 MG tablet  ibuprofen (ADVIL/MOTRIN) 600 MG tablet  insulin aspart (NOVOLOG PEN) 100 UNIT/ML pen  insulin glargine (LANTUS PEN) 100 UNIT/ML pen  methocarbamol (ROBAXIN) 750 MG tablet  omeprazole (PRILOSEC) 10 MG DR capsule  polyethylene glycol (MIRALAX) 17 GM/Dose powder  ramipril (ALTACE) 5 MG capsule  sildenafil (REVATIO) 20 MG tablet  tamsulosin (FLOMAX) 0.4 MG capsule      Allergies   Allergen Reactions    Amoxicillin-Pot Clavulanate Hives     Family History  Family History   Problem Relation Age of Onset    Malignant Hyperthermia No family hx of      Social History   Social History     Tobacco Use    Smoking status: Never    Smokeless tobacco: Never   Vaping Use    Vaping status: Never Used   Substance Use Topics    Alcohol use: Not Currently     Comment: occasional    Drug use: Never      A medically appropriate review of systems was performed with pertinent positives and negatives noted in the HPI, and all other systems negative.    Physical Exam   BP: 125/69  Pulse: 113  Temp: 97  F (36.1  C)  Resp: 18  SpO2: 95 %  Physical Exam  Constitutional:       General: He is not in acute distress.     Appearance: He is ill-appearing. He is not toxic-appearing.    Cardiovascular:      Rate and Rhythm: Tachycardia present.   Pulmonary:      Breath sounds: Examination of the right-middle field reveals decreased breath sounds. Examination of the right-lower field reveals decreased breath sounds. Decreased breath sounds present. No wheezing or rhonchi.      Comments: Right chest wall with breaths.  Right lateral chest wall wound bed is well-appearing.  No erythema or edema.  No crepitus.;  Sats 89% on room air.  Neurological:      Mental Status: He is oriented to person, place, and time.   Psychiatric:         Mood and Affect: Mood normal.         Behavior: Behavior normal.           ED Course, Procedures, & Data      Procedures                No results found for any visits on 05/29/24.  Medications - No data to display  Labs Ordered and Resulted from Time of ED Arrival to Time of ED Departure - No data to display  XR Chest 2 Views    (Results Pending)          Critical care was not performed.     Medical Decision Making  The patient's presentation was of high complexity (an acute health issue posing potential threat to life or bodily function).    The patient's evaluation involved:  review of external note(s) from 3+ sources (see separate area of note for details)  review of 3+ test result(s) ordered prior to this encounter (see separate area of note for details)  ordering and/or review of 3+ test(s) in this encounter (see separate area of note for details)  discussion of management or test interpretation with another health professional (see separate area of note for details)      The patient's management necessitated moderate risk (prescription drug management including medications given in the ED) and high risk (a decision regarding hospitalization).    Assessment & Plan    Patient is a 53-year-old male with a known history of trachobronchiomalasia recently had surgery with thoracic surgery who presents to the ER due to worsening shortness of breath.  Patient symptoms  became worse last night and today.  He was seen in outside hospital in [place] and had a chest x-ray and a CT chest done.  This showed moderate to severe right-sided pleural effusion.  I reviewed the images on PACS.  Patient's labs from earlier today were reviewed.  I repeated labs today that shows a sodium since improved.  Patient is receiving oxygen) and Dilaudid for pain control.  Case is discussed with thoracic surgery who recommends admission to their service for further care.    I have reviewed the nursing notes. I have reviewed the findings, diagnosis, plan and need for follow up with the patient.    New Prescriptions    No medications on file       Final diagnoses:   Pleural effusion on right   Tracheobronchomalacia     I, Maddy Renee, am serving as a trained medical scribe to document services personally performed by Briana Orozco MD, based on the provider's statements to me.     I, Briana Orozco MD, was physically present and have reviewed and verified the accuracy of this note documented by Maddy Renee.    Briana Orozco MD  [facility] EMERGENCY DEPARTMENT  5/29/2024     Briana Orozco MD  05/29/24 9925

## 2024-05-30 NOTE — H&P
[facility]    Consult Note - Thoracic Surgery Service  Date of Admission:  5/17/2024  Consult Requested by: ED, Dr. Orozco  Reason for Consult: SOB     Assessment & Plan: Surgery   Brendan Helms is a 53 year old male with a history of DM1, HLD, tracheobronchomalacia s/p tracheobronchoplasty with prolene mesh on 5/17 (Tim) who presented to an OSH ED on 5/29 due to shortness of breath that started on 5/28 found to have right pleural effusion. He has mild conversational dyspnea but is not currently tachypneic and saturating >92% on 2L O2.     - Admit to thoracic surgery  - Give 40mg lasix now  - Plan for drainage of pleural effusion 5/30  - Consistent carbohydrate, NPO at midnight in case of need for procedure  - High resistance sliding scale insulin, will give half of his normal lantus dose given NPO after midnight   - Pain control with tylenol, robaxin, oxycodone, dilaudid   - Scheduled bowel regimen, PRN suppository     Drains: None     Code Status:  Full                        # Obesity: Estimated body mass index is 31.19 kg/m  as calculated from the following:    Height as of this encounter: 1.829 m (6').    Weight as of this encounter: 104.3 kg (230 lb).              The patient's care was discussed with the Chief Resident/Fellow, Dr. Price who discussed with staff, Dr. Dumont.     Riri Garcia, DO  PGY-2 General Surgery  _______________________________________________________________     Chief Complaint  SOB     History is obtained from the patient, wife        History of Present Illness  Brendan Helms is a 53 year old male with a history of DM1, HLD, tracheobronchomalacia s/p tracheobronchoplasty with prolene mesh on 5/17 (Tim) who presented to an OSH ED on 5/29 due to shortness of breath that started on 5/28. He repots shortness of breath began yesterday and progressively worsened prompting him to go to the ED in [place]. Imaging there showed a pleural  effusion. No fevers. Also reports severe pain located in his back near his thoracotomy incision. Per chart review he did have difficulty with pain control in the immediate postoperative period. He had been taking dilaudid which was working fairly well, but stopped taking it today due to abdominal bloating, which increased his pain. He has had a couple small bowel movements since surgery, is passing gas and does feel that his bloating is currently improved.     Upon arrival to our ED he was afebrile without hypotension with tachycardia to 100's, RR 18, SpO2 96% on 2L nasal cannula. CT at OSH demonstrated a pleural effusion (available in PACs). It was recommended he be transferred to [facility] by EMS, there were no ambulances available and patient's wife drove him to [facility] in a personal vehicle. CXR completed here showed a moderate right sided pleural effusion.       Past Medical History    Past Medical History:   Diagnosis Date    Diabetes, type I     HLD (hyperlipidemia)     Tracheobronchomalacia        Past Surgical History   Past Surgical History:   Procedure Laterality Date    BRONCHOSCOPY (RIGID OR FLEXIBLE), DIAGNOSTIC N/A 7/13/2023    Procedure: BRONCHOSCOPY, WITH BRONCHOALVEOLAR LAVAGE;  Surgeon: Rod Desai MD;  Location: [facility]    BRONCHOSCOPY FLEXIBLE AND RIGID N/A 5/20/2024    Procedure: Bronchoscopy flexible and rigid;  Surgeon: Rod Desai MD;  Location: [facility]    BRONCHOSCOPY, DILATE BRONCHUS, STENT BRONCHUS, COMBINED N/A 4/5/2024    Procedure: BRONCHOSCOPY, RIGID, flexible bronchoscopy , stent placement;  Surgeon: Rod Desai MD;  [facility]    BRONCHOSCOPY, DILATE BRONCHUS, STENT BRONCHUS, COMBINED N/A 4/19/2024    Procedure: BRONCHOSCOPY, RIGID,  flexible bronchoscopy , stent removal, cryoprobe tissue debulking;  Surgeon: Ruthy George MD;  Location: [facility]    THORACOTOMY Right 5/17/2024    Procedure: Thoracotomy, Tracheobronchoplasty, Intercostal Nerve Cryoablation, Flexible  Bronchoscopy;  Surgeon: Lisa Gandara MD;  Location: [facility]       Prior to Admission Medications   I have reviewed this patient's current medications       Physical Exam   Vital Signs: Temp: 98.8  F (37.1  C) Temp src: Temporal BP: 133/83 Pulse: 105   Resp: (!) 32 SpO2: 96 % O2 Device: Nasal cannula Oxygen Delivery: 2 LPM  Weight: 0 lbs 0 ozNo intake or output data in the 24 hours ending 05/29/24 6811  General Appearance: Sitting up, no acute distress  Respiratory: labored breathing only with talking, unlabored at rest, on 2L NC  Cardiovascular: tachycardia to 105  GI: soft, moderately distended,non-tender  Skin: Right thoracotomy incision with overlying steri strips, appears to be healing well without surrounding erythema or drainage, prior chest tube site with overlying dressing      Data     I have personally reviewed the following data over the past 24 hrs:    9.4  \   12.8 (L)   / 503 (H)     133 (L) 99 12.0 /  94   4.9 23 0.81 \     ALT: 26 AST: 30 AP: 123 TBILI: 0.6   ALB: 3.5 TOT PROTEIN: 6.9 LIPASE: N/A     Trop: N/A BNP: <36

## 2024-05-30 NOTE — PROCEDURES
[facility]    Procedure: IR Procedure Note    Date/Time: 5/30/2024 11:02 AM    Performed by: Sherley Renee DO  Authorized by: Brice Ferro MD      UNIVERSAL PROTOCOL   Site Marked: NA  Prior Images Obtained and Reviewed:  Yes  Required items: Required blood products, implants, devices and special equipment available    Patient identity confirmed:  Verbally with patient, arm band, provided demographic data and hospital-assigned identification number  Patient was reevaluated immediately before administering moderate or deep sedation or anesthesia  Confirmation Checklist:  Patient's identity using two indicators, relevant allergies, procedure was appropriate and matched the consent or emergent situation and correct equipment/implants were available  Time out: Immediately prior to the procedure a time out was called    Universal Protocol: the Joint Commission Universal Protocol was followed    Preparation: Patient was prepped and draped in usual sterile fashion    ESBL (mL):  1     ANESTHESIA    Anesthesia:  Local infiltration  Local Anesthetic:  Lidocaine 1% without epinephrine  Anesthetic Total (mL):  10      SEDATION    Patient Sedated: No    See dictated procedure note for full details.  Findings: Right 12 Fr chest tube placed.     Specimens: fluid and/or tissue for gram stain and culture    Complications: None    Condition: Stable    Plan: -Chest tube to -20 cmH2O  -60 ml right pleural fluid aspirated and sent for labs      PROCEDURE    Patient Tolerance:  Patient tolerated the procedure well with no immediate complications  Length of time physician/provider present for 1:1 monitoring during sedation: 0

## 2024-05-30 NOTE — ED NOTES
Pt is mad that his AM lantus was not ordered and refused to take his novolog at this time until the provider orders the lantus.Thoracic surgery resident was paged.

## 2024-05-30 NOTE — IR NOTE
Patient Name: Brendan Helms  Medical Record Number: [id number]  Today's Date: 5/30/2024    Procedure: Right Sided Chest Tube Placement  Proceduralist: Dr. Renee and Dr. Ferro  Pathology present: REYNA    Procedure Start: 1048  Procedure end: 1100  Sedation medications administered: Local, 0.4 mg Dilaudid, and 50 mcg Fentanyl for pain.    Report given to: ED RN  : REYNA    Other Notes: Pt arrived to IR room 7 from ED. Consent reviewed. Pt denies any questions or concerns regarding procedure. Pt positioned sitting and monitored per protocol. Pt tolerated procedure without any noted complications. Samples collected and sent to lab as ordered.  Chest Tube to -20 mm Hg; okay to transport water sealed.  Clamped Chest tube at 11:08 am; 1300 ml red/clear output.  Can unclamp at 1208.  Pt transferred back to ED.

## 2024-05-30 NOTE — MEDICATION SCRIBE - ADMISSION MEDICATION HISTORY
Medication Scribe Admission Medication History    Admission medication history is complete. The information provided in this note is only as accurate as the sources available at the time of the update.    Information Source(s): Patient via in-person    Pertinent Information: Pt reported taking medications on PTA medication list, stated he has not taken medications for the last couple days due to some unknown side effects, but will go back taking medications regularly after hospital visit.      Changes made to PTA medication list:  Added: None  Deleted: None  Changed: None    Allergies reviewed with patient and updates made in EHR: yes    Medication History Completed By: Victorina Sinclair 5/30/2024 6:38 AM    PTA Med List   Medication Sig Last Dose    acetaminophen (TYLENOL) 500 MG tablet Take 2 tablets (1,000 mg) by mouth every 6 hours Unknown    albuterol (PROAIR HFA/PROVENTIL HFA/VENTOLIN HFA) 108 (90 Base) MCG/ACT inhaler Inhale 2 puffs into the lungs every 4 hours as needed for shortness of breath or wheezing 5/29/2024    albuterol (PROVENTIL) (2.5 MG/3ML) 0.083% neb solution Take 1 vial (2.5 mg) by nebulization every 6 hours as needed for shortness of breath, wheezing or cough Past Month    atorvastatin (LIPITOR) 40 MG tablet Take 1 tablet by mouth At Bedtime Past Week    blood glucose (CONTOUR TEST) test strip Test 6 times daily and as needed. Past Week    famotidine (PEPCID) 40 MG tablet Take 1 tablet (40 mg) by mouth 2 times daily as needed for heartburn Past Week    gabapentin (NEURONTIN) 300 MG capsule Take 1 capsule (300 mg) by mouth 2 times daily Past Week    Glucagon (BAQSIMI ONE PACK) 3 MG/DOSE POWD   at has not used    guaiFENesin (MUCINEX) 600 MG 12 hr tablet Take 1 tablet (600 mg) by mouth 2 times daily Past Week    HYDROmorphone (DILAUDID) 4 MG tablet Take 1 tablet (4 mg) by mouth every 4 hours as needed for pain 5/29/2024    ibuprofen (ADVIL/MOTRIN) 600 MG tablet Take 1 tablet (600 mg) by mouth  every 6 hours as needed for moderate pain Unknown    insulin aspart (NOVOLOG PEN) 100 UNIT/ML pen Inject Subcutaneous 4 times daily (with meals and nightly) Sliding scale and carb counting 5/28/2024    insulin glargine (LANTUS PEN) 100 UNIT/ML pen Inject 35 Units Subcutaneous 2 times daily 35 units in AM and 35 in PM 5/29/2024    methocarbamol (ROBAXIN) 750 MG tablet Take 1 tablet (750 mg) by mouth every 8 hours as needed for muscle spasms Unknown    omeprazole (PRILOSEC) 10 MG DR capsule Take 1 capsule (10 mg) by mouth daily Past Week    polyethylene glycol (MIRALAX) 17 GM/Dose powder Take 17 g by mouth daily as needed for constipation Past Week    ramipril (ALTACE) 5 MG capsule Take 1 capsule by mouth at bedtime Past Week    sildenafil (REVATIO) 20 MG tablet Take 40-60 mg by mouth daily as needed Unknown    tamsulosin (FLOMAX) 0.4 MG capsule Take 0.4 mg by mouth daily Past Week

## 2024-05-30 NOTE — ED NOTES
Pt is rating his pain now a 4/10. patient is ok with provider's plan on holding off PCA and doing oxycodone.Provider updated on pt's pain level.

## 2024-05-30 NOTE — PROGRESS NOTES
Patient     Respiratory WDL     Respiratory WDL WDL;X, is winded while speaking or on exertion. Patient is not in respiratory distress and pulse ox reports o2>94, is on 2 L NC.               Skin Circulation/Temperature WDL     Skin Circulation/Temperature WDL WDL                Cardiac WDL     Cardiac X;       Cardiac Rhythm SR              Peripheral/Neurovascular WDL     Peripheral Neurovascular WDL WDL              Cognitive/Neuro/Behavioral WDL     Cognitive/Neuro/Behavioral WDL      Patient BG low upon receiving patient at 51, patient refused glucose gel and requested OJ and apple juice which sugars went up to 75. MD Notified and ptx placed on d5 & .45 NS at 75ml/hr . Patient sugar at 7am 145. Patient in no distress, however have back pain, I given patient Dilauded .4mg, tyelnol 975mg, and oxy 10 spaced out within 2-3 hours. Patient reports 7/10 pain.

## 2024-05-30 NOTE — CONSULTS
"    Interventional Radiology  [facility] Consult Service Note  05/30/24   8:28 AM    Consult Requested: Chest tube placement.    Recommendations/Plan:    Patient will be added to IR schedule on 5/30/24 for a right-sided pigtail chest tube placement. Timing of procedure is TBD based on staffing/schedule and triage.    This is a 53 year old male with history of tracheobronchiomalacia status post thoracotomy with tracheobronchoplasty, intercostal nerve cryoablation, and flexible bronchoscopy on 5/17/24. He presents to ED with SOB, chest X-ray demonstrates right pleural effusion. Patient's team requesting chest tube placement. Per CT surgery team, no concern for empyema or hemothorax. Chest tube requested (rather than thoracentesis) in case of effusion recurrence.    Orders for diagnostics to be entered by requesting team (CT surgery)    Labs WNL for procedure.   Preprocedural orders entered, as well as orders for procedure. No NPO required.  Consent will be done prior to procedure.     Please contact the IR charge RN at [phone] for estimated time of procedure.     Case and imaging discussed with IR attending, Dr. Ferro. Recommendations were reviewed with requesting team (CT surgery).        Pertinent Imaging Reviewed: X-ray chest, 5/30/24, CT chest (9/20/23).    Expected date of discharge:  TBD.    Vitals:   BP (!) 146/94   Pulse 91   Temp 98.2  F (36.8  C) (Oral)   Resp 22   SpO2 97%     Pertinent Labs:   Lab Results   Component Value Date    WBC 8.2 05/30/2024    WBC 9.4 05/29/2024    WBC 6.7 05/23/2024     Lab Results   Component Value Date    HGB 11.3 05/30/2024    HGB 12.8 05/29/2024    HGB 11.7 05/23/2024     Lab Results   Component Value Date     05/30/2024     05/29/2024     05/23/2024     No results found for: \"INR\", \"PTT\"  Lab Results   Component Value Date    POTASSIUM 4.9 05/29/2024    POTASSIUM 5.2 05/17/2024        COVID-19 Antibody Results, Testing for " Immunity           No data to display              COVID-19 PCR Results          1/27/2023    16:59   COVID-19 PCR Results   COVID-19 Virus by PCR (External Result) Negative          Details          This result is from an external source.               Cesario Nye PA-C  Interventional Radiology  Pager: [phone].

## 2024-05-30 NOTE — PROGRESS NOTES
Thoracic Surgery Progress Note  05/30/2024       Subjective:  No acute events overnight. Patient sitting upright in bed, reports some shortness of breath but is satting upper 90's on 3L nasal cannula. Denies chest pain.     Objective:  Temp:  [97  F (36.1  C)-98.8  F (37.1  C)] 98.2  F (36.8  C)  Pulse:  [] 91  Resp:  [14-39] 22  BP: (125-146)/() 146/94  SpO2:  [88 %-97 %] 97 %    I/O last 3 completed shifts:  In: -   Out: 1125 [Urine:1125]      Gen: Awake, alert, NAD  Resp: NLB on RA, Mildly tachypneic on 3L NC, some paradoxical movement noted about the right chest wall  Abd: soft, nondistended, nontender  Ext: WWP, no edema     Labs:  Recent Labs   Lab 05/30/24  0812 05/29/24 2113   WBC 8.2 9.4   HGB 11.3* 12.8*   * 503*       Recent Labs   Lab 05/30/24  0803 05/30/24  0704 05/30/24  0540 05/29/24  2121 05/29/24  2113   NA  --   --   --   --  133*   POTASSIUM  --   --   --   --  4.9   CHLORIDE  --   --   --   --  99   CO2  --   --   --   --  23   BUN  --   --   --   --  12.0   CR  --   --   --   --  0.81   * 147* 75   < > 96   ANA CRISTINA  --   --   --   --  9.0    < > = values in this interval not displayed.       Imaging:  Results for orders placed or performed during the hospital encounter of 05/29/24   XR Chest 2 Views    Impression    IMPRESSION:     Elevated right hemidiaphragm. Small to moderate right pleural effusion which has increased in size since the prior exam. An adjacent airspace opacity at the right lung base is favored to reflect atelectasis, although infection is not excluded. Left lung   is clear. No left pleural effusion. No visible pneumothorax or pneumomediastinum.    Stable, nonenlarged cardiac silhouette.    Stable thoracotomy changes within the right chest. Previous subcutaneous emphysema within the right chest wall is no longer visualized.   XR Chest Port 1 View    Impression    IMPRESSION: Moderate right pleural effusion and atelectasis/consolidation of the mid and  "lower right lung similar to 5/29/2024. Left lung remains clear. Normal heart size and pulmonary vascularity. No pneumothorax.          Assessment/Plan:   53 year old male with tracheobronchomalacia s/p tracheobronchoplasty with prolene mesh and right lateral thoracotamy on 5/17 with Dr. Dumont who is now presenting for shortness of breath due to recurrent right pleural effusion.  - IR consult for right pleural drain placement  - Pleural fluid cultures ordered  - NPO  - Monitor I/O's, net negative fluid goal of 1-2 L  - HDSSI  - Multimodal pain control       Seen with fellow who will discuss with staff  - - - - - - - - - - - - - - - - - -  Noah Vázquez MD  General Surgery PGY-1  Pager: [phone]      See [facility] for on-call pager information: [facility] Paging/Directory   \"SURGERY THORACIC /[facility]\"    "

## 2024-05-30 NOTE — ED TRIAGE NOTES
Pt presents to ED endorsing shortness of breath starting yesterday and growing increasingly worse. Pt underwent thoracotomy last week with . Pt also endorses back pain.       Triage Assessment (Adult)       Row Name 05/29/24 2051          Triage Assessment    Airway WDL WDL        Respiratory WDL    Respiratory WDL WDL;X        Skin Circulation/Temperature WDL    Skin Circulation/Temperature WDL WDL        Cardiac WDL    Cardiac WDL WDL     Cardiac Rhythm ST        Peripheral/Neurovascular WDL    Peripheral Neurovascular WDL WDL        Cognitive/Neuro/Behavioral WDL    Cognitive/Neuro/Behavioral WDL WDL

## 2024-05-31 ENCOUNTER — APPOINTMENT (OUTPATIENT)
Dept: GENERAL RADIOLOGY | Facility: CLINIC | Age: 54
DRG: 187 | End: 2024-05-31
Payer: COMMERCIAL

## 2024-05-31 ENCOUNTER — ANESTHESIA (OUTPATIENT)
Dept: SURGERY | Facility: CLINIC | Age: 54
DRG: 187 | End: 2024-05-31
Payer: COMMERCIAL

## 2024-05-31 ENCOUNTER — ANESTHESIA EVENT (OUTPATIENT)
Dept: SURGERY | Facility: CLINIC | Age: 54
DRG: 187 | End: 2024-05-31
Payer: COMMERCIAL

## 2024-05-31 LAB
ALBUMIN SERPL BCG-MCNC: 2.9 G/DL (ref 3.5–5.2)
ALP SERPL-CCNC: 100 U/L (ref 40–150)
ALT SERPL W P-5'-P-CCNC: 16 U/L (ref 0–70)
ANION GAP SERPL CALCULATED.3IONS-SCNC: 53 MMOL/L (ref 7–15)
ANION GAP SERPL CALCULATED.3IONS-SCNC: 9 MMOL/L (ref 7–15)
AST SERPL W P-5'-P-CCNC: 18 U/L (ref 0–45)
ATRIAL RATE - MUSE: 81 BPM
BILIRUB SERPL-MCNC: 0.4 MG/DL
BUN SERPL-MCNC: 10.9 MG/DL (ref 6–20)
BUN SERPL-MCNC: 12 MG/DL (ref 6–20)
CA-I BLD-MCNC: 4.3 MG/DL (ref 4.4–5.2)
CALCIUM SERPL-MCNC: 7.1 MG/DL (ref 8.6–10)
CALCIUM SERPL-MCNC: 8.5 MG/DL (ref 8.6–10)
CHLORIDE SERPL-SCNC: 101 MMOL/L (ref 98–107)
CHLORIDE SERPL-SCNC: 82 MMOL/L (ref 98–107)
CREAT SERPL-MCNC: 0.64 MG/DL (ref 0.67–1.17)
CREAT SERPL-MCNC: 0.76 MG/DL (ref 0.67–1.17)
DEPRECATED HCO3 PLAS-SCNC: 18 MMOL/L (ref 22–29)
DEPRECATED HCO3 PLAS-SCNC: 25 MMOL/L (ref 22–29)
DIASTOLIC BLOOD PRESSURE - MUSE: NORMAL MMHG
EGFRCR SERPLBLD CKD-EPI 2021: >90 ML/MIN/1.73M2
EGFRCR SERPLBLD CKD-EPI 2021: >90 ML/MIN/1.73M2
ERYTHROCYTE [DISTWIDTH] IN BLOOD BY AUTOMATED COUNT: 13.4 % (ref 10–15)
GLUCOSE BLDC GLUCOMTR-MCNC: 130 MG/DL (ref 70–99)
GLUCOSE BLDC GLUCOMTR-MCNC: 179 MG/DL (ref 70–99)
GLUCOSE BLDC GLUCOMTR-MCNC: 180 MG/DL (ref 70–99)
GLUCOSE BLDC GLUCOMTR-MCNC: 184 MG/DL (ref 70–99)
GLUCOSE BLDC GLUCOMTR-MCNC: 192 MG/DL (ref 70–99)
GLUCOSE BLDC GLUCOMTR-MCNC: 223 MG/DL (ref 70–99)
GLUCOSE BLDC GLUCOMTR-MCNC: 42 MG/DL (ref 70–99)
GLUCOSE BLDC GLUCOMTR-MCNC: 52 MG/DL (ref 70–99)
GLUCOSE SERPL-MCNC: 187 MG/DL (ref 70–99)
GLUCOSE SERPL-MCNC: 212 MG/DL (ref 70–99)
HCT VFR BLD AUTO: 33.1 % (ref 40–53)
HGB BLD-MCNC: 11 G/DL (ref 13.3–17.7)
HOLD SPECIMEN: NORMAL
INTERPRETATION ECG - MUSE: NORMAL
MCH RBC QN AUTO: 28.7 PG (ref 26.5–33)
MCHC RBC AUTO-ENTMCNC: 33.2 G/DL (ref 31.5–36.5)
MCV RBC AUTO: 86 FL (ref 78–100)
P AXIS - MUSE: 64 DEGREES
PLATELET # BLD AUTO: 442 10E3/UL (ref 150–450)
POTASSIUM SERPL-SCNC: 4.7 MMOL/L (ref 3.4–5.3)
POTASSIUM SERPL-SCNC: 5.4 MMOL/L (ref 3.4–5.3)
PR INTERVAL - MUSE: 168 MS
PROT SERPL-MCNC: 5.6 G/DL (ref 6.4–8.3)
QRS DURATION - MUSE: 82 MS
QT - MUSE: 360 MS
QTC - MUSE: 418 MS
R AXIS - MUSE: 39 DEGREES
RBC # BLD AUTO: 3.83 10E6/UL (ref 4.4–5.9)
SODIUM SERPL-SCNC: 135 MMOL/L (ref 135–145)
SODIUM SERPL-SCNC: 153 MMOL/L (ref 135–145)
SYSTOLIC BLOOD PRESSURE - MUSE: NORMAL MMHG
T AXIS - MUSE: 54 DEGREES
VENTRICULAR RATE- MUSE: 81 BPM
WBC # BLD AUTO: 5.1 10E3/UL (ref 4–11)

## 2024-05-31 PROCEDURE — 250N000013 HC RX MED GY IP 250 OP 250 PS 637: Performed by: THORACIC SURGERY (CARDIOTHORACIC VASCULAR SURGERY)

## 2024-05-31 PROCEDURE — 85027 COMPLETE CBC AUTOMATED: CPT

## 2024-05-31 PROCEDURE — 80048 BASIC METABOLIC PNL TOTAL CA: CPT

## 2024-05-31 PROCEDURE — 82962 GLUCOSE BLOOD TEST: CPT

## 2024-05-31 PROCEDURE — 71045 X-RAY EXAM CHEST 1 VIEW: CPT

## 2024-05-31 PROCEDURE — 71045 X-RAY EXAM CHEST 1 VIEW: CPT | Mod: 26 | Performed by: RADIOLOGY

## 2024-05-31 PROCEDURE — 0W9930Z DRAINAGE OF RIGHT PLEURAL CAVITY WITH DRAINAGE DEVICE, PERCUTANEOUS APPROACH: ICD-10-PCS | Performed by: RADIOLOGY

## 2024-05-31 PROCEDURE — 120N000002 HC R&B MED SURG/OB UMMC

## 2024-05-31 PROCEDURE — 36415 COLL VENOUS BLD VENIPUNCTURE: CPT | Performed by: THORACIC SURGERY (CARDIOTHORACIC VASCULAR SURGERY)

## 2024-05-31 PROCEDURE — 250N000011 HC RX IP 250 OP 636: Performed by: STUDENT IN AN ORGANIZED HEALTH CARE EDUCATION/TRAINING PROGRAM

## 2024-05-31 PROCEDURE — 36415 COLL VENOUS BLD VENIPUNCTURE: CPT

## 2024-05-31 PROCEDURE — 64461 PVB THORACIC SINGLE INJ SITE: CPT | Mod: 59 | Performed by: ANESTHESIOLOGY

## 2024-05-31 PROCEDURE — 250N000013 HC RX MED GY IP 250 OP 250 PS 637

## 2024-05-31 PROCEDURE — 82330 ASSAY OF CALCIUM: CPT

## 2024-05-31 PROCEDURE — 999N000141 HC STATISTIC PRE-PROCEDURE NURSING ASSESSMENT

## 2024-05-31 PROCEDURE — 3E0T3BZ INTRODUCTION OF ANESTHETIC AGENT INTO PERIPHERAL NERVES AND PLEXI, PERCUTANEOUS APPROACH: ICD-10-PCS | Performed by: ANESTHESIOLOGY

## 2024-05-31 PROCEDURE — 250N000011 HC RX IP 250 OP 636

## 2024-05-31 RX ORDER — NALOXONE HYDROCHLORIDE 0.4 MG/ML
0.4 INJECTION, SOLUTION INTRAMUSCULAR; INTRAVENOUS; SUBCUTANEOUS
Status: DISCONTINUED | OUTPATIENT
Start: 2024-05-31 | End: 2024-05-31 | Stop reason: HOSPADM

## 2024-05-31 RX ORDER — FENTANYL CITRATE 50 UG/ML
25-50 INJECTION, SOLUTION INTRAMUSCULAR; INTRAVENOUS
Status: DISCONTINUED | OUTPATIENT
Start: 2024-05-31 | End: 2024-05-31 | Stop reason: HOSPADM

## 2024-05-31 RX ORDER — GABAPENTIN 300 MG/1
300 CAPSULE ORAL 3 TIMES DAILY
Status: DISCONTINUED | OUTPATIENT
Start: 2024-05-31 | End: 2024-06-02 | Stop reason: HOSPADM

## 2024-05-31 RX ORDER — NALOXONE HYDROCHLORIDE 0.4 MG/ML
0.2 INJECTION, SOLUTION INTRAMUSCULAR; INTRAVENOUS; SUBCUTANEOUS
Status: DISCONTINUED | OUTPATIENT
Start: 2024-05-31 | End: 2024-05-31 | Stop reason: HOSPADM

## 2024-05-31 RX ORDER — BUPIVACAINE HYDROCHLORIDE 2.5 MG/ML
INJECTION, SOLUTION EPIDURAL; INFILTRATION; INTRACAUDAL
Status: COMPLETED | OUTPATIENT
Start: 2024-05-31 | End: 2024-05-31

## 2024-05-31 RX ORDER — CALCIUM CITRATE/VITAMIN D3 315MG-6.25
1 TABLET ORAL DAILY
Status: DISCONTINUED | OUTPATIENT
Start: 2024-05-31 | End: 2024-06-02 | Stop reason: HOSPADM

## 2024-05-31 RX ORDER — FLUMAZENIL 0.1 MG/ML
0.2 INJECTION, SOLUTION INTRAVENOUS
Status: DISCONTINUED | OUTPATIENT
Start: 2024-05-31 | End: 2024-05-31 | Stop reason: HOSPADM

## 2024-05-31 RX ADMIN — OXYCODONE HYDROCHLORIDE 10 MG: 10 TABLET ORAL at 11:57

## 2024-05-31 RX ADMIN — DOCUSATE SODIUM 50 MG AND SENNOSIDES 8.6 MG 2 TABLET: 8.6; 5 TABLET, FILM COATED ORAL at 07:55

## 2024-05-31 RX ADMIN — OXYCODONE HYDROCHLORIDE 10 MG: 10 TABLET ORAL at 15:26

## 2024-05-31 RX ADMIN — INSULIN GLARGINE 17 UNITS: 100 INJECTION, SOLUTION SUBCUTANEOUS at 19:43

## 2024-05-31 RX ADMIN — ACETAMINOPHEN 975 MG: 325 TABLET, FILM COATED ORAL at 00:36

## 2024-05-31 RX ADMIN — TAMSULOSIN HYDROCHLORIDE 0.4 MG: 0.4 CAPSULE ORAL at 07:56

## 2024-05-31 RX ADMIN — DOCUSATE SODIUM 50 MG AND SENNOSIDES 8.6 MG 1 TABLET: 8.6; 5 TABLET, FILM COATED ORAL at 19:45

## 2024-05-31 RX ADMIN — INSULIN GLARGINE 17 UNITS: 100 INJECTION, SOLUTION SUBCUTANEOUS at 08:10

## 2024-05-31 RX ADMIN — GABAPENTIN 300 MG: 300 CAPSULE ORAL at 19:45

## 2024-05-31 RX ADMIN — BUPIVACAINE HYDROCHLORIDE 10 ML: 2.5 INJECTION, SOLUTION EPIDURAL; INFILTRATION; INTRACAUDAL at 10:30

## 2024-05-31 RX ADMIN — GABAPENTIN 300 MG: 300 CAPSULE ORAL at 15:23

## 2024-05-31 RX ADMIN — FENTANYL CITRATE 50 MCG: 50 INJECTION, SOLUTION INTRAMUSCULAR; INTRAVENOUS at 10:36

## 2024-05-31 RX ADMIN — INSULIN ASPART 3 UNITS: 100 INJECTION, SOLUTION INTRAVENOUS; SUBCUTANEOUS at 08:09

## 2024-05-31 RX ADMIN — KETOROLAC TROMETHAMINE 15 MG: 15 INJECTION, SOLUTION INTRAMUSCULAR; INTRAVENOUS at 04:25

## 2024-05-31 RX ADMIN — FENTANYL CITRATE 50 MCG: 50 INJECTION, SOLUTION INTRAMUSCULAR; INTRAVENOUS at 10:23

## 2024-05-31 RX ADMIN — METHOCARBAMOL 750 MG: 750 TABLET ORAL at 20:02

## 2024-05-31 RX ADMIN — POLYETHYLENE GLYCOL 3350 17 G: 17 POWDER, FOR SOLUTION ORAL at 07:56

## 2024-05-31 RX ADMIN — METHOCARBAMOL 750 MG: 750 TABLET ORAL at 04:25

## 2024-05-31 RX ADMIN — OXYCODONE HYDROCHLORIDE 10 MG: 10 TABLET ORAL at 00:36

## 2024-05-31 RX ADMIN — ACETAMINOPHEN 975 MG: 325 TABLET, FILM COATED ORAL at 15:23

## 2024-05-31 RX ADMIN — OXYCODONE HYDROCHLORIDE 10 MG: 10 TABLET ORAL at 07:55

## 2024-05-31 RX ADMIN — ACETAMINOPHEN 975 MG: 325 TABLET, FILM COATED ORAL at 07:55

## 2024-05-31 RX ADMIN — MIDAZOLAM 1 MG: 1 INJECTION INTRAMUSCULAR; INTRAVENOUS at 10:23

## 2024-05-31 RX ADMIN — ACETAMINOPHEN 975 MG: 325 TABLET, FILM COATED ORAL at 19:45

## 2024-05-31 RX ADMIN — PANTOPRAZOLE SODIUM 20 MG: 20 TABLET, DELAYED RELEASE ORAL at 07:56

## 2024-05-31 NOTE — ED NOTES
"Checked BG at 1600 42. Patient have no hypoglycemic symptoms. Patient requested apple juice , BG checked 15 minute later 52. Per protocol to administer glycogen IM/ Subcu, patient refused. \" I am  feeling good\"  and do not want the glycogen. \" I have just ordered my dinner.\" Will check patient BG        rechecked, awaiting dinner    "

## 2024-05-31 NOTE — ANESTHESIA PROCEDURE NOTES
Paravertebral Procedure Note    Pre-Procedure   Staff -        Anesthesiologist:  Gerald Santos MD       Resident/Fellow: Denis Ceballos MD       Performed By: resident       Location: pre-op       Procedure Start/Stop Times: 5/31/2024 10:30 AM and 5/31/2024 11:00 AM       Pre-Anesthestic Checklist: patient identified, IV checked, site marked, risks and benefits discussed, informed consent, monitors and equipment checked, pre-op evaluation, at physician/surgeon's request and post-op pain management  Timeout:       Correct Patient: Yes        Correct Procedure: Yes        Correct Site: Yes        Correct Position: Yes        Correct Laterality: Yes        Site Marked: Yes  Procedure Documentation  Procedure: Paravertebral       Diagnosis: PAIN MANAGEMENT       Laterality: right       Patient Position: prone       Skin prep: Chloraprep       Local skin infiltrated with mL of 1% lidocaine.        Insertion Site: T5-6 (T3-4).       Needle Type: insulated and short bevel       Needle Gauge: 21.        Needle Length (millimeters): 110           Catheter threaded easily.             Ultrasound guided       1. Ultrasound was used to identify targeted nerve, plexus, vascular marker, or fascial plane and place a needle adjacent to it in real-time.       2. Ultrasound was used to visualize the spread of anesthetic in close proximity to the above referenced structure.    Assessment/Narrative         The placement was negative for: blood aspirated, painful injection and site bleeding       Paresthesias: No.       Bolus given via needle..        Secured via.        Insertion/Infusion Method: Single Shot       Complications: none    Medication(s) Administered   Bupivacaine 0.25% PF (Infiltration) - Infiltration   10 mL - 5/31/2024 10:30:00 AM  Bupivacaine liposome (Exparel) 1.3% LA inj susp (Infiltration) - Infiltration   20 mL - 5/31/2024 10:30:00 AM  Medication Administration Time: 5/31/2024  "10:30 AM     Comments:  Discussed with Dilia, OR pharmacist manager, about using exparel for this patient who has had a cryoablation in the OR and was recently re-admitted for pain and pleural effusion management. We believe patient will benefit from exparel use      FOR [facility] ONLY:   Pain Team Contact information: please page the Pain Team Via [employer]. Search \"Pain\". During daytime hours, please page the attending first. At night please page the resident first.      "

## 2024-05-31 NOTE — CONSULTS
Pain Service Consultation Note  [facility]      Patient Name: Brendan Helms  MRN: [id number]   Age: 53 year old  Sex: male  Date: May 31, 2024                                      Reviewed: Yes    Referring Provider:  Noah Vázquez MD   Referring Service:  Thoracic surgery  Reason for Consultation: pain management    Assessment/Recommendations:  53 year old male s/p tracheobronchoplasty with prolene mesh and right lateral thoracotamy on 5/17 with Dr. Dumont and cryoablation. He was admitted on 5/29 for dyspnea and recurrent right pleural effusion s/p chest tube, requiring 3L/min of supplemental oxygen.    Plan:   - Single shot right sided paravertebral block with exparel. Discussed with Dilia - OR pharmacist manager. Please see procedure notes for more details.  - Recommend starting Gabapentin.      Thank you for the opportunity to participate in the care of Brendan Helms  Pain Service will continue to follow.    Discussed with attending anesthesiologist  Primary Service Contacted with Recommendations? Yes    Denis Mead MD  5/31/2024      Chief Complaint:  Right sided chest pain    History of Present Illness:  Brendan Helms is a 53 year old male s/p tracheobronchoplasty with prolene mesh and right lateral thoracotamy on 5/17 with Dr. Dumont and cryoablation.   The pain is reported to be acute, located in the right thorax and is associated with allodynia.  Current pain is rated at 8/10 and goal is 3-4/10.     Pain Assessment:   Description of Pain: unable to describe  Location: right thorax, between spine and scapula  Current Pain: 3-4/10  Goal: 3/10  Relieving/exacerbating factors: pain medication, rest   Radiating: no   Constant: yes    Past Medical History:  Past Medical History:   Diagnosis Date    Diabetes, type I     HLD (hyperlipidemia)     Tracheobronchomalacia      Family History:    Family History   Problem Relation Age of Onset    Malignant Hyperthermia No  "family hx of      Social History:  Social History     Tobacco Use    Smoking status: Never    Smokeless tobacco: Never   Substance Use Topics    Alcohol use: Not Currently     Comment: occasional       Review of Systems:  Complete ROS reviewed. Unless otherwise noted, all other systems found to be negative.        Laboratory Results:  Recent Labs   Lab Test 05/31/24  0717 05/31/24  0434   PLT  --  442   BUN 12.0 10.9         Allergies:  Allergies   Allergen Reactions    Amoxicillin-Pot Clavulanate Hives         Current Pain Related Medications:  Medications related to Pain Management (From now, onward)      Start     Dose/Rate Route Frequency Ordered Stop    05/31/24 0935  BUPivacaine liposome (EXPAREL) LA inj was given in the infiltration site to produce post-op analgesia. Duration of action is up to 72 hours. Other \"eugenio\" meds should not be given for 96 hours except for lidocaine 4% patch. This is for INFORMATION ONLY.          Does not apply CONTINUOUS PRN 05/31/24 0935 06/04/24 0934    05/30/24 2000  senna-docusate (SENOKOT-S/PERICOLACE) 8.6-50 MG per tablet 1 tablet        Placed in \"Or\" Linked Group    1 tablet Oral 2 TIMES DAILY 05/30/24 1234      05/30/24 2000  senna-docusate (SENOKOT-S/PERICOLACE) 8.6-50 MG per tablet 2 tablet        Placed in \"Or\" Linked Group    2 tablet Oral 2 TIMES DAILY 05/30/24 1234      05/30/24 1544  ketorolac (TORADOL) injection 15 mg         15 mg Intravenous EVERY 6 HOURS PRN 05/30/24 1544 06/04/24 1543    05/30/24 1544  hydrOXYzine HCl (ATARAX) tablet 25 mg        Placed in \"Or\" Linked Group    25 mg Oral EVERY 6 HOURS PRN 05/30/24 1544      05/30/24 1544  hydrOXYzine HCl (ATARAX) tablet 50 mg        Placed in \"Or\" Linked Group    50 mg Oral EVERY 6 HOURS PRN 05/30/24 1544      05/30/24 1543  methocarbamol (ROBAXIN) tablet 750 mg         750 mg Oral 4 TIMES DAILY PRN 05/30/24 1543      05/30/24 0800  polyethylene glycol (MIRALAX) Packet 17 g         17 g Oral DAILY 05/30/24 " "0005      05/30/24 0100  acetaminophen (TYLENOL) tablet 975 mg         975 mg Oral EVERY 6 HOURS 05/30/24 0005      05/30/24 0005  lidocaine 1 % 0.1-1 mL         0.1-1 mL Other EVERY 1 HOUR PRN 05/30/24 0005      05/30/24 0005  lidocaine (LMX4) cream          Topical EVERY 1 HOUR PRN 05/30/24 0005 05/30/24 0005  oxyCODONE (ROXICODONE) tablet 5 mg         5 mg Oral EVERY 4 HOURS PRN 05/30/24 0005      05/30/24 0005  oxyCODONE IR (ROXICODONE) tablet 10 mg         10 mg Oral EVERY 4 HOURS PRN 05/30/24 0005      05/30/24 0005  HYDROmorphone (DILAUDID) injection 0.2 mg         0.2 mg Intravenous EVERY 2 HOURS PRN 05/30/24 0005      05/30/24 0005  HYDROmorphone (DILAUDID) injection 0.4 mg         0.4 mg Intravenous EVERY 2 HOURS PRN 05/30/24 0005      05/30/24 0005  bisacodyl (DULCOLAX) suppository 10 mg         10 mg Rectal DAILY PRN 05/30/24 0005                Physical Exam:  Vitals: /85   Pulse 85   Temp 36.1  C (97  F) (Oral)   Resp 10   SpO2 96%     Physical Exam:   CONSTITUTIONAL/GENERAL APPEARANCE:  NAD  EYES: EOMI, sclera anicteric, PERRLA  ENT/NECK: atraumatic, lips and oral mucous membranes dry  RESPIRATORY: non-labored breathing. No cough, wheeze  CV: RRR, no audible rubs, gallops, or murmurs  ABDOMEN: Soft, non-tender, non-distended  MUSCULOSKELETAL/BACK/SPINE/EXTREMITIES: Moves all extremities purposefully.  No edema or obvious joint deformities   NEURO: Alert and Oriented x3. Answers questions appropriately  SKIN/VASCULAR EXAM:  No jaundice, no visible rashes or lesions  Right sided chest tube in place      30 MINUTES SPENT BY ME on the date of service doing chart review, history, exam, documentation & further activities per the note.      Acute Inpatient Pain Service [facility]  Hours of pain coverage 24/7   Page via [employer]- Please Page the Pain Team Via [facility]: \"PAIN MANAGEMENT ACUTE INPATIENT/ [facility]\"            "

## 2024-05-31 NOTE — ED NOTES
Patient refused BG at noon. Per patient he will call staff to have BG check after he order his meal.

## 2024-05-31 NOTE — PROGRESS NOTES
Thoracic Surgery Progress Note  05/31/2024       Subjective:  No acute events overnight. Patient laying in bed, reports better pain control this morning than yesterday. Reports severe incisional, back, and right shoulder blade pain with movements. Denies significant shortness of breath or abdominal pain.     Objective:  Temp:  [98.3  F (36.8  C)-98.7  F (37.1  C)] 98.5  F (36.9  C)  Pulse:  [] 83  Resp:  [14-28] 16  BP: (105-152)/() 115/76  SpO2:  [95 %-100 %] 95 %    I/O last 3 completed shifts:  In: 120 [P.O.:120]  Out: 4065 [Urine:900; Chest Tube:3165]      Gen: Awake, alert, NAD  Resp: NLB on RA, Mildly tachypneic on 4L NC, some paradoxical movement noted about the right chest wall  Abd: soft, nondistended, nontender  Ext: WWP, no edema     Labs:  Recent Labs   Lab 05/31/24  0434 05/30/24  0812 05/29/24 2113   WBC 5.1 8.2 9.4   HGB 11.0* 11.3* 12.8*    452* 503*       Recent Labs   Lab 05/31/24  0439 05/31/24  0434 05/31/24  0039 05/30/24  1234 05/30/24  0812 05/29/24  2121 05/29/24  2113   NA  --  153*  --   --  134*  --  133*   POTASSIUM  --  5.4*  --   --  4.7  --  4.9   CHLORIDE  --  82*  --   --  101  --  99   CO2  --  18*  --   --  26  --  23   BUN  --  10.9  --   --  11.8  --  12.0   CR  --  0.64*  --   --  0.82  --  0.81   * 187* 184*   < > 144*   < > 96   ANA CRISTINA  --  7.1*  --   --  8.6  --  9.0    < > = values in this interval not displayed.       Imaging:  Results for orders placed or performed during the hospital encounter of 05/29/24   XR Chest 2 Views    Impression    IMPRESSION:     Elevated right hemidiaphragm. Small to moderate right pleural effusion which has increased in size since the prior exam. An adjacent airspace opacity at the right lung base is favored to reflect atelectasis, although infection is not excluded. Left lung   is clear. No left pleural effusion. No visible pneumothorax or pneumomediastinum.    Stable, nonenlarged cardiac silhouette.    Stable  "thoracotomy changes within the right chest. Previous subcutaneous emphysema within the right chest wall is no longer visualized.   XR Chest Port 1 View    Impression    IMPRESSION: Moderate right pleural effusion and atelectasis/consolidation of the mid and lower right lung similar to 5/29/2024. Left lung remains clear. Normal heart size and pulmonary vascularity. No pneumothorax.          Assessment/Plan:   53 year old male with tracheobronchomalacia s/p tracheobronchoplasty with prolene mesh and right lateral thoracotamy on 5/17 with Dr. Dumont who is now presenting for shortness of breath due to recurrent right pleural effusion. Chest tube placed with 3L out shortly after placement, now experiencing ongoing severe pain. Hyperkalemic, hypocalcemic, and hypernatremic this morning, suspect this sample was hemolyzed. No concerning findings on EKG, CMP reordered and pending.  - Pain management consult, appreciate recs  - Monitor chest tube output  - Pleural fluid cultures and triglycerides pending  - Regular diet  - PO calcium repletion  - Monitor I/O's  - HDSSI, half dose home glargine  - Multimodal pain control       Seen with Dr. Celeste  - - - - - - - - - - - - - - - - - -  Noah Vázquez MD  General Surgery PGY-1  Pager: [phone]      See [facility] for on-call pager information: [facility] Paging/Directory   \"SURGERY THORACIC /[facility]\"    "

## 2024-05-31 NOTE — ED NOTES
Pt is been NPO since midnight and pt now  requesting to eat .Thoracic surgery provider listed as first call was paged but no response. 3rd caller was paged two times  for a diet order.

## 2024-05-31 NOTE — ED NOTES
Bed: ED21  Expected date:   Expected time:   Means of arrival:   Comments:  Hold for pt. Pt coming back

## 2024-05-31 NOTE — PROGRESS NOTES
Right side paravertebral block performed without complications. 100mcg Fentanyl and 1mg Midazolam given. VSS. Pt on 2L nasal cannula. Pt tolerated well.  Will continue to monitor.

## 2024-06-01 ENCOUNTER — APPOINTMENT (OUTPATIENT)
Dept: GENERAL RADIOLOGY | Facility: CLINIC | Age: 54
DRG: 187 | End: 2024-06-01
Payer: COMMERCIAL

## 2024-06-01 LAB
ANION GAP SERPL CALCULATED.3IONS-SCNC: 10 MMOL/L (ref 7–15)
BUN SERPL-MCNC: 9.4 MG/DL (ref 6–20)
CALCIUM SERPL-MCNC: 8.7 MG/DL (ref 8.6–10)
CHLORIDE SERPL-SCNC: 102 MMOL/L (ref 98–107)
CREAT SERPL-MCNC: 0.8 MG/DL (ref 0.67–1.17)
DEPRECATED HCO3 PLAS-SCNC: 22 MMOL/L (ref 22–29)
EGFRCR SERPLBLD CKD-EPI 2021: >90 ML/MIN/1.73M2
ERYTHROCYTE [DISTWIDTH] IN BLOOD BY AUTOMATED COUNT: 13.4 % (ref 10–15)
GLUCOSE BLDC GLUCOMTR-MCNC: 109 MG/DL (ref 70–99)
GLUCOSE BLDC GLUCOMTR-MCNC: 125 MG/DL (ref 70–99)
GLUCOSE BLDC GLUCOMTR-MCNC: 219 MG/DL (ref 70–99)
GLUCOSE BLDC GLUCOMTR-MCNC: 228 MG/DL (ref 70–99)
GLUCOSE BLDC GLUCOMTR-MCNC: 256 MG/DL (ref 70–99)
GLUCOSE BLDC GLUCOMTR-MCNC: 74 MG/DL (ref 70–99)
GLUCOSE BLDC GLUCOMTR-MCNC: 82 MG/DL (ref 70–99)
GLUCOSE SERPL-MCNC: 150 MG/DL (ref 70–99)
HCT VFR BLD AUTO: 37.3 % (ref 40–53)
HGB BLD-MCNC: 11.8 G/DL (ref 13.3–17.7)
MCH RBC QN AUTO: 28.5 PG (ref 26.5–33)
MCHC RBC AUTO-ENTMCNC: 31.6 G/DL (ref 31.5–36.5)
MCV RBC AUTO: 90 FL (ref 78–100)
PLATELET # BLD AUTO: 540 10E3/UL (ref 150–450)
POTASSIUM SERPL-SCNC: 4.4 MMOL/L (ref 3.4–5.3)
RBC # BLD AUTO: 4.14 10E6/UL (ref 4.4–5.9)
SODIUM SERPL-SCNC: 134 MMOL/L (ref 135–145)
WBC # BLD AUTO: 5.9 10E3/UL (ref 4–11)

## 2024-06-01 PROCEDURE — 250N000013 HC RX MED GY IP 250 OP 250 PS 637: Performed by: THORACIC SURGERY (CARDIOTHORACIC VASCULAR SURGERY)

## 2024-06-01 PROCEDURE — 71045 X-RAY EXAM CHEST 1 VIEW: CPT

## 2024-06-01 PROCEDURE — 250N000013 HC RX MED GY IP 250 OP 250 PS 637

## 2024-06-01 PROCEDURE — 85027 COMPLETE CBC AUTOMATED: CPT

## 2024-06-01 PROCEDURE — 250N000012 HC RX MED GY IP 250 OP 636 PS 637

## 2024-06-01 PROCEDURE — 36415 COLL VENOUS BLD VENIPUNCTURE: CPT

## 2024-06-01 PROCEDURE — 120N000002 HC R&B MED SURG/OB UMMC

## 2024-06-01 PROCEDURE — 99231 SBSQ HOSP IP/OBS SF/LOW 25: CPT | Mod: GC

## 2024-06-01 PROCEDURE — 71045 X-RAY EXAM CHEST 1 VIEW: CPT | Mod: 26 | Performed by: RADIOLOGY

## 2024-06-01 PROCEDURE — 80048 BASIC METABOLIC PNL TOTAL CA: CPT

## 2024-06-01 RX ADMIN — POLYETHYLENE GLYCOL 3350 17 G: 17 POWDER, FOR SOLUTION ORAL at 08:10

## 2024-06-01 RX ADMIN — INSULIN GLARGINE 17 UNITS: 100 INJECTION, SOLUTION SUBCUTANEOUS at 20:32

## 2024-06-01 RX ADMIN — OXYCODONE HYDROCHLORIDE 10 MG: 10 TABLET ORAL at 05:32

## 2024-06-01 RX ADMIN — OXYCODONE HYDROCHLORIDE 10 MG: 10 TABLET ORAL at 20:39

## 2024-06-01 RX ADMIN — Medication 1 TABLET: at 14:02

## 2024-06-01 RX ADMIN — ACETAMINOPHEN 975 MG: 325 TABLET, FILM COATED ORAL at 14:10

## 2024-06-01 RX ADMIN — GABAPENTIN 300 MG: 300 CAPSULE ORAL at 14:10

## 2024-06-01 RX ADMIN — TAMSULOSIN HYDROCHLORIDE 0.4 MG: 0.4 CAPSULE ORAL at 08:11

## 2024-06-01 RX ADMIN — DOCUSATE SODIUM 50 MG AND SENNOSIDES 8.6 MG 1 TABLET: 8.6; 5 TABLET, FILM COATED ORAL at 20:31

## 2024-06-01 RX ADMIN — DOCUSATE SODIUM 50 MG AND SENNOSIDES 8.6 MG 1 TABLET: 8.6; 5 TABLET, FILM COATED ORAL at 08:11

## 2024-06-01 RX ADMIN — GABAPENTIN 300 MG: 300 CAPSULE ORAL at 08:11

## 2024-06-01 RX ADMIN — INSULIN GLARGINE 17 UNITS: 100 INJECTION, SOLUTION SUBCUTANEOUS at 11:13

## 2024-06-01 RX ADMIN — GABAPENTIN 300 MG: 300 CAPSULE ORAL at 20:32

## 2024-06-01 RX ADMIN — ACETAMINOPHEN 975 MG: 325 TABLET, FILM COATED ORAL at 20:32

## 2024-06-01 RX ADMIN — INSULIN ASPART: 100 INJECTION, SOLUTION INTRAVENOUS; SUBCUTANEOUS at 20:34

## 2024-06-01 RX ADMIN — PANTOPRAZOLE SODIUM 20 MG: 20 TABLET, DELAYED RELEASE ORAL at 08:11

## 2024-06-01 NOTE — PLAN OF CARE
Goal Outcome Evaluation:      Plan of Care Reviewed With: patient    Admitted/transferred from:   2 RN full   skin assessment completed by Dane CHILDS RN and NAZ Fuentes  Skin assessment finding: other - Rt Chest tube with dressing, CDI. New Chest tube site ,CDI. Old Chest tube incision sites, CDI.     Interventions/actions: skin interventions is not required at this time.      Bedside Emergency Equipment Present:  Suction Regulator: Yes  Suction Canister: Yes  Tubing between Regulator and Canister: Yes  O2 Regulator with Tree: Yes  Ambu Bag: Yes     Vitals: Blood pressure 134/82, pulse 102, temperature 97.9  F (36.6  C), temperature source Oral, resp. rate 18, SpO2 94%.  Time: 6775-3453  Neuro: A/O x 4, calls appropriately and makes needs known.  Activity: up ad deedee   Pain and N/V: Denies Pain and n/v at this time.  GI/: No BM this shift, Voiding spontaneously. AUOP.   Cardiac: Denies cardiac chest pain.   Diet: Regular.   Respiratory: Denies SOB, on RA. R chest tube to - 20 cm H2O.   Lines/Drains: PIV and CT.   Incisions/Skin: WDL, No new deficit.  Lab: Reviewed.  New changes this shift: No significant changes this shift, Continue POC.

## 2024-06-01 NOTE — PROGRESS NOTES
"Pain Service Progress Note  [facility]  Date: 06/01/2024       Patient Name: Brendan Helms  MRN: [id number]  Age: 53 year old  Sex: male      Assessment:  53 year old male s/p tracheobronchoplasty with prolene mesh and right lateral thoracotamy on 5/17 with Dr. Dumont and cryoablation. He was admitted on 5/29 for dyspnea and recurrent right pleural effusion s/p chest tube, requiring 3L/min of supplemental oxygen.     Plan:   - Single shot right sided paravertebral block with exparel 5/31   - expect pain improvement up to 72 hours after block  - continue gabapentin  - consider switching IV dilaudid PRN to PO oxycodone PRN    Pain Service will sign off.    Discussed with attending anesthesiologist    Miguel A Wiggins MD  Anesthesiology Resident, CA-3  June 1, 2024    Overnight Events: pain significantly improved with block    Tubes/Drains: Yes  R sided chest tube    Subjective: \"Had no pain after the block, but now it feels like it normally should for having a chest tube.\"  Nausea: No  Vomiting: No  Pruritus: No  Symptoms of LAST: No    Pain Location:  Right sided chest pain    Pain Assessment:   Description of Pain: unable to describe  Location: right thorax, between spine and scapula  Current Pain: 2/10  Goal: 3/10  Relieving/exacerbating factors: pain medication, rest   Radiating: no   Constant: yes    Diet: Regular Diet Adult    Relevant Labs:  Recent Labs   Lab Test 06/01/24  0815   *   BUN 9.4       Physical Exam:  Vitals: /86   Pulse 102   Temp 36.8  C (98.2  F) (Oral)   Resp 18   SpO2 94%     Physical Exam:   CONSTITUTIONAL/GENERAL APPEARANCE:  NAD  EYES: EOMI, sclera anicteric, PERRLA  ENT/NECK: atraumatic, lips and oral mucous membranes dry  RESPIRATORY: non-labored breathing. No cough, wheeze  CV: RRR, no audible rubs, gallops, or murmurs  ABDOMEN: Soft, non-tender, non-distended  MUSCULOSKELETAL/BACK/SPINE/EXTREMITIES: Moves all extremities purposefully.  No edema " "or obvious joint deformities   NEURO: Alert and Oriented x3. Answers questions appropriately  SKIN/VASCULAR EXAM:  No jaundice, no visible rashes or lesions  Right sided chest tube in place      Relevant Medications:  Current Pain Medications:  Medications related to Pain Management (From now, onward)      Start     Dose/Rate Route Frequency Ordered Stop    05/31/24 1400  gabapentin (NEURONTIN) capsule 300 mg         300 mg Oral 3 TIMES DAILY 05/31/24 1346      05/31/24 0935  BUPivacaine liposome (EXPAREL) LA inj was given in the infiltration site to produce post-op analgesia. Duration of action is up to 72 hours. Other \"eugenio\" meds should not be given for 96 hours except for lidocaine 4% patch. This is for INFORMATION ONLY.          Does not apply CONTINUOUS PRN 05/31/24 0935 06/04/24 0934    05/30/24 2000  senna-docusate (SENOKOT-S/PERICOLACE) 8.6-50 MG per tablet 1 tablet        Placed in \"Or\" Linked Group    1 tablet Oral 2 TIMES DAILY 05/30/24 1234      05/30/24 2000  senna-docusate (SENOKOT-S/PERICOLACE) 8.6-50 MG per tablet 2 tablet        Placed in \"Or\" Linked Group    2 tablet Oral 2 TIMES DAILY 05/30/24 1234      05/30/24 1544  ketorolac (TORADOL) injection 15 mg         15 mg Intravenous EVERY 6 HOURS PRN 05/30/24 1544 06/04/24 1543    05/30/24 1544  hydrOXYzine HCl (ATARAX) tablet 25 mg        Placed in \"Or\" Linked Group    25 mg Oral EVERY 6 HOURS PRN 05/30/24 1544      05/30/24 1544  hydrOXYzine HCl (ATARAX) tablet 50 mg        Placed in \"Or\" Linked Group    50 mg Oral EVERY 6 HOURS PRN 05/30/24 1544      05/30/24 1543  methocarbamol (ROBAXIN) tablet 750 mg         750 mg Oral 4 TIMES DAILY PRN 05/30/24 1543      05/30/24 0800  polyethylene glycol (MIRALAX) Packet 17 g         17 g Oral DAILY 05/30/24 0005      05/30/24 0100  acetaminophen (TYLENOL) tablet 975 mg         975 mg Oral EVERY 6 HOURS 05/30/24 0005      05/30/24 0005  lidocaine 1 % 0.1-1 mL         0.1-1 mL Other EVERY 1 HOUR PRN 05/30/24 " "0005      05/30/24 0005  lidocaine (LMX4) cream          Topical EVERY 1 HOUR PRN 05/30/24 0005      05/30/24 0005  oxyCODONE (ROXICODONE) tablet 5 mg         5 mg Oral EVERY 4 HOURS PRN 05/30/24 0005      05/30/24 0005  oxyCODONE IR (ROXICODONE) tablet 10 mg         10 mg Oral EVERY 4 HOURS PRN 05/30/24 0005      05/30/24 0005  HYDROmorphone (DILAUDID) injection 0.2 mg         0.2 mg Intravenous EVERY 2 HOURS PRN 05/30/24 0005      05/30/24 0005  HYDROmorphone (DILAUDID) injection 0.4 mg         0.4 mg Intravenous EVERY 2 HOURS PRN 05/30/24 0005      05/30/24 0005  bisacodyl (DULCOLAX) suppository 10 mg         10 mg Rectal DAILY PRN 05/30/24 0005              Primary Service Contacted with Recommendations? Yes      30 MINUTES SPENT BY ME on the date of service doing chart review, history, exam, documentation & further activities per the note.      Acute Inpatient Pain Service [facility]  Hours of pain coverage 24/7   Page via [employer]- Please Page the Pain Team Via [employer]: \"PAIN MANAGEMENT ACUTE INPATIENT/ [facility]\"         "

## 2024-06-01 NOTE — PROGRESS NOTES
STAFF ADDENDUM:  I saw and evaluated Mr. Helms and agree with the resident s findings and plan of care      Pain beytter controlled today  Minimal chest tube output       Keep tube in   Ambulate       Geena Celeste MD

## 2024-06-01 NOTE — PROGRESS NOTES
Thoracic Surgery Progress Note  06/01/2024       Subjective:  No acute events overnight. Better pain control. Chest tube with minimal output, no airleak. Has not ambulated significantly.     Objective:  Temp:  [97  F (36.1  C)-98.2  F (36.8  C)] 98.2  F (36.8  C)  Pulse:  [] 102  Resp:  [10-20] 18  BP: (107-147)/(65-97) 129/86  SpO2:  [87 %-100 %] 94 %    I/O last 3 completed shifts:  In: -   Out: 1015 [Urine:1000; Chest Tube:15]      Gen: Awake, alert, NAD  Resp: NLB on RA, chest tube with 20 ml output, no airleak  Abd: soft, nondistended, nontender  Ext: WWP, no edema     Labs:  Recent Labs   Lab 06/01/24  0815 05/31/24  0434 05/30/24  0812   WBC 5.9 5.1 8.2   HGB 11.8* 11.0* 11.3*   * 442 452*       Recent Labs   Lab 06/01/24  0815 06/01/24  0735 06/01/24  0153 05/31/24  0807 05/31/24  0717 05/31/24  0439 05/31/24  0434   *  --   --   --  135  --  153*   POTASSIUM 4.4  --   --   --  4.7  --  5.4*   CHLORIDE 102  --   --   --  101  --  82*   CO2 22  --   --   --  25  --  18*   BUN 9.4  --   --   --  12.0  --  10.9   CR 0.80  --   --   --  0.76  --  0.64*   * 82 74   < > 212*   < > 187*   ANA CRISTINA 8.7  --   --   --  8.5*  --  7.1*    < > = values in this interval not displayed.       Imaging:  Results for orders placed or performed during the hospital encounter of 05/29/24   XR Chest 2 Views    Impression    IMPRESSION:     Elevated right hemidiaphragm. Small to moderate right pleural effusion which has increased in size since the prior exam. An adjacent airspace opacity at the right lung base is favored to reflect atelectasis, although infection is not excluded. Left lung   is clear. No left pleural effusion. No visible pneumothorax or pneumomediastinum.    Stable, nonenlarged cardiac silhouette.    Stable thoracotomy changes within the right chest. Previous subcutaneous emphysema within the right chest wall is no longer visualized.   XR Chest Port 1 View    Impression    IMPRESSION: Moderate  right pleural effusion and atelectasis/consolidation of the mid and lower right lung similar to 5/29/2024. Left lung remains clear. Normal heart size and pulmonary vascularity. No pneumothorax.          Assessment/Plan:   53 year old male with tracheobronchomalacia s/p tracheobronchoplasty with prolene mesh and right lateral thoracotamy on 5/17 with Dr. Dumont who is now presenting for shortness of breath due to recurrent right pleural effusion. Chest tube placed with 3L out shortly after placement, minimal output last 24 hours, no airleak.    - Pain management consult, appreciate recs  - Monitor chest tube output after ambulation  - Regular diet  - PO calcium repletion  - Monitor I/O's  - HDSSI, half dose home glargine  - Multimodal pain control       Seen with Dr. Celeste  - - - - - - - - - - - - - - - - - -  Marcos Martin MD  Cardiothoracic Surgery Fellow  Pager: [phone]

## 2024-06-01 NOTE — PLAN OF CARE
Plan of Care Reviewed With: patient    Overall Patient Progress: improving    Outcome Evaluation: Patient up independently in room. Denies SOB. Chest tube to water seal. Encourage IS. Continue to monitor.

## 2024-06-02 ENCOUNTER — APPOINTMENT (OUTPATIENT)
Dept: GENERAL RADIOLOGY | Facility: CLINIC | Age: 54
DRG: 187 | End: 2024-06-02
Payer: COMMERCIAL

## 2024-06-02 VITALS
RESPIRATION RATE: 16 BRPM | SYSTOLIC BLOOD PRESSURE: 142 MMHG | DIASTOLIC BLOOD PRESSURE: 77 MMHG | HEART RATE: 88 BPM | OXYGEN SATURATION: 94 % | TEMPERATURE: 98.4 F

## 2024-06-02 LAB
ANION GAP SERPL CALCULATED.3IONS-SCNC: 9 MMOL/L (ref 7–15)
BUN SERPL-MCNC: 7.9 MG/DL (ref 6–20)
CALCIUM SERPL-MCNC: 8.8 MG/DL (ref 8.6–10)
CHLORIDE SERPL-SCNC: 105 MMOL/L (ref 98–107)
CREAT SERPL-MCNC: 0.79 MG/DL (ref 0.67–1.17)
DEPRECATED HCO3 PLAS-SCNC: 23 MMOL/L (ref 22–29)
EGFRCR SERPLBLD CKD-EPI 2021: >90 ML/MIN/1.73M2
ERYTHROCYTE [DISTWIDTH] IN BLOOD BY AUTOMATED COUNT: 13.2 % (ref 10–15)
GLUCOSE BLDC GLUCOMTR-MCNC: 101 MG/DL (ref 70–99)
GLUCOSE BLDC GLUCOMTR-MCNC: 168 MG/DL (ref 70–99)
GLUCOSE BLDC GLUCOMTR-MCNC: 228 MG/DL (ref 70–99)
GLUCOSE BLDC GLUCOMTR-MCNC: 61 MG/DL (ref 70–99)
GLUCOSE BLDC GLUCOMTR-MCNC: 66 MG/DL (ref 70–99)
GLUCOSE BLDC GLUCOMTR-MCNC: 76 MG/DL (ref 70–99)
GLUCOSE SERPL-MCNC: 189 MG/DL (ref 70–99)
HCT VFR BLD AUTO: 34.2 % (ref 40–53)
HGB BLD-MCNC: 11.4 G/DL (ref 13.3–17.7)
MCH RBC QN AUTO: 28.7 PG (ref 26.5–33)
MCHC RBC AUTO-ENTMCNC: 33.3 G/DL (ref 31.5–36.5)
MCV RBC AUTO: 86 FL (ref 78–100)
PLATELET # BLD AUTO: 529 10E3/UL (ref 150–450)
POTASSIUM SERPL-SCNC: 4.3 MMOL/L (ref 3.4–5.3)
RBC # BLD AUTO: 3.97 10E6/UL (ref 4.4–5.9)
SODIUM SERPL-SCNC: 137 MMOL/L (ref 135–145)
WBC # BLD AUTO: 5.2 10E3/UL (ref 4–11)

## 2024-06-02 PROCEDURE — 250N000013 HC RX MED GY IP 250 OP 250 PS 637

## 2024-06-02 PROCEDURE — 80048 BASIC METABOLIC PNL TOTAL CA: CPT

## 2024-06-02 PROCEDURE — 71045 X-RAY EXAM CHEST 1 VIEW: CPT

## 2024-06-02 PROCEDURE — 71045 X-RAY EXAM CHEST 1 VIEW: CPT | Mod: 76

## 2024-06-02 PROCEDURE — 250N000013 HC RX MED GY IP 250 OP 250 PS 637: Performed by: THORACIC SURGERY (CARDIOTHORACIC VASCULAR SURGERY)

## 2024-06-02 PROCEDURE — 36415 COLL VENOUS BLD VENIPUNCTURE: CPT

## 2024-06-02 PROCEDURE — 71045 X-RAY EXAM CHEST 1 VIEW: CPT | Mod: 26 | Performed by: RADIOLOGY

## 2024-06-02 PROCEDURE — 0WP9X0Z REMOVAL OF DRAINAGE DEVICE FROM RIGHT PLEURAL CAVITY, EXTERNAL APPROACH: ICD-10-PCS | Performed by: THORACIC SURGERY (CARDIOTHORACIC VASCULAR SURGERY)

## 2024-06-02 PROCEDURE — 85027 COMPLETE CBC AUTOMATED: CPT

## 2024-06-02 RX ORDER — AMOXICILLIN 250 MG
1 CAPSULE ORAL 2 TIMES DAILY
Qty: 28 TABLET | Refills: 0 | Status: SHIPPED | OUTPATIENT
Start: 2024-06-02

## 2024-06-02 RX ORDER — ACETAMINOPHEN 500 MG
1000 TABLET ORAL EVERY 6 HOURS
Qty: 100 TABLET | Refills: 0 | Status: SHIPPED | OUTPATIENT
Start: 2024-06-02

## 2024-06-02 RX ORDER — OXYCODONE HYDROCHLORIDE 5 MG/1
5-10 TABLET ORAL EVERY 6 HOURS PRN
Qty: 50 TABLET | Refills: 0 | Status: SHIPPED | OUTPATIENT
Start: 2024-06-02 | End: 2024-06-12

## 2024-06-02 RX ORDER — GABAPENTIN 300 MG/1
300 CAPSULE ORAL 3 TIMES DAILY
Qty: 50 CAPSULE | Refills: 0 | Status: SHIPPED | OUTPATIENT
Start: 2024-06-02

## 2024-06-02 RX ADMIN — ACETAMINOPHEN 975 MG: 325 TABLET, FILM COATED ORAL at 01:02

## 2024-06-02 RX ADMIN — Medication 1 TABLET: at 08:39

## 2024-06-02 RX ADMIN — GABAPENTIN 300 MG: 300 CAPSULE ORAL at 08:38

## 2024-06-02 RX ADMIN — POLYETHYLENE GLYCOL 3350 17 G: 17 POWDER, FOR SOLUTION ORAL at 08:52

## 2024-06-02 RX ADMIN — PANTOPRAZOLE SODIUM 20 MG: 20 TABLET, DELAYED RELEASE ORAL at 08:39

## 2024-06-02 RX ADMIN — DOCUSATE SODIUM 50 MG AND SENNOSIDES 8.6 MG 1 TABLET: 8.6; 5 TABLET, FILM COATED ORAL at 08:38

## 2024-06-02 RX ADMIN — OXYCODONE HYDROCHLORIDE 10 MG: 10 TABLET ORAL at 05:06

## 2024-06-02 RX ADMIN — GABAPENTIN 300 MG: 300 CAPSULE ORAL at 14:13

## 2024-06-02 RX ADMIN — ACETAMINOPHEN 975 MG: 325 TABLET, FILM COATED ORAL at 06:51

## 2024-06-02 RX ADMIN — OXYCODONE HYDROCHLORIDE 10 MG: 10 TABLET ORAL at 14:13

## 2024-06-02 RX ADMIN — ACETAMINOPHEN 975 MG: 325 TABLET, FILM COATED ORAL at 14:13

## 2024-06-02 RX ADMIN — INSULIN ASPART 11 UNITS: 100 INJECTION, SOLUTION INTRAVENOUS; SUBCUTANEOUS at 08:41

## 2024-06-02 RX ADMIN — TAMSULOSIN HYDROCHLORIDE 0.4 MG: 0.4 CAPSULE ORAL at 08:43

## 2024-06-02 RX ADMIN — INSULIN GLARGINE 17 UNITS: 100 INJECTION, SOLUTION SUBCUTANEOUS at 08:39

## 2024-06-02 ASSESSMENT — ACTIVITIES OF DAILY LIVING (ADL)
ADLS_ACUITY_SCORE: 35

## 2024-06-02 NOTE — PLAN OF CARE
Goal Outcome Evaluation:      Plan of Care Reviewed With: patient    Overall Patient Progress: improvingOverall Patient Progress: improving       Vitals: Blood pressure 116/66, pulse 89, temperature 97.4  F (36.3  C), temperature source Oral, resp. rate 17, SpO2 96%.  Time: 1900-0730  Neuro: A/O x 4, calls appropriately and makes needs known.  Activity: up ad deedee   Pain and N/V: R chest incisional pain managed with oxycodone q4. Denies n/v at this time.  GI/: No BM this shift, Voiding spontaneously. AUOP.   Cardiac: Denies cardiac chest pain.   Diet: Regular.   Respiratory: Denies SOB, on RA. R chest tube to to water seal.   Lines/Drains: PIV and CT.   Incisions/Skin: WDL, No new deficit.  Lab: Reviewed.  New changes this shift: No significant changes this shift, Continue POC.

## 2024-06-02 NOTE — PROGRESS NOTES
Patient verbalized understanding of After Visit Summary. Personal belongings packed, PIV removed, and medications picked up at discharge pharmacy for patient.

## 2024-06-02 NOTE — PROGRESS NOTES
STAFF ADDENDUM:  I saw and evaluated Mr. Helms and agree with the resident s findings and plan of care     Doing well   Pain controlled   Remove tube today   Discharge today vs tmrw based o pain control    Geena Celeste MD

## 2024-06-02 NOTE — PROGRESS NOTES
Thoracic Surgery Progress Note  2024       Subjective:  No acute events. Pain still well controlled. No new issues.      Objective:  Temp:  [97.4  F (36.3  C)-98.3  F (36.8  C)] 98.2  F (36.8  C)  Pulse:  [87-94] 87  Resp:  [17-18] 17  BP: (116-132)/(65-78) 120/78  Cuff Mean (mmHg):  [92] 92  SpO2:  [92 %-96 %] 92 %    I/O last 3 completed shifts:  In: 200 [P.O.:200]  Out: 8 [Chest Tube:8]      Gen: Awake, alert, NAD  Resp: NLB on RA, chest tube with minimal output, no air leak.   Abd: soft, nondistended, nontender  Ext: WWP, no edema     Labs:  Recent Labs   Lab 24  0644 24  0815 24  0434   WBC 5.2 5.9 5.1   HGB 11.4* 11.8* 11.0*   * 540* 442       Recent Labs   Lab 24  1248 24  1229 24  1210 24  0751 24  0644 24  1223 24  0815 24  0807 24  0717   NA  --   --   --   --  137  --  134*  --  135   POTASSIUM  --   --   --   --  4.3  --  4.4  --  4.7   CHLORIDE  --   --   --   --  105  --  102  --  101   CO2  --   --   --   --  23  --  22  --  25   BUN  --   --   --   --  7.9  --  9.4  --  12.0   CR  --   --   --   --  0.79  --  0.80  --  0.76   * 76 66*   < > 189*   < > 150*   < > 212*   ANA CRISTINA  --   --   --   --  8.8  --  8.7  --  8.5*    < > = values in this interval not displayed.       Imagin/2 CXR  COMPARISON STUDY: 2024   FINDINGS: Cardiac silhouette is enlarged. Right basilar chest tube.  Right basilar atelectasis. Thoracotomy rib changes on the right. Old  bilateral apical fractures  IMPRESSION: No pneumothorax.       Assessment/Plan:   53 year old male with tracheobronchomalacia s/p tracheobronchoplasty with prolene mesh and right lateral thoracotamy on  with Dr. Dumont who is now presenting for shortness of breath due to recurrent right pleural effusion. Chest tube placed with 3L out shortly after placement, minimal subsequent output   Pain control significantly improved following  R paravertebral block  (single injection of exparel) by pain service.     - Continue multimodal pain control, PO only to prepare for discharge  - Regular diet  - Remove chest tube today  - Monitor I/O's  - HDSSI, half dose home glargine  - Discharge home later today vs tomorrow pending pain control and patient comfort       Seen and discussed with staff    Agustina Castellon MD  Surgery PGY3

## 2024-06-03 NOTE — DISCHARGE SUMMARY
THORACIC SURGERY DISCHARGE SUMMARY    NAME: Brendan Helms   MRN: [id number]   : 1970     DATE OF ADMISSION: 2024     ADMISSION DIAGNOSIS: right pleural effusion, post-operative pain    PROCEDURES PERFORMED:    12Fr right chest tube placement (Interventional radiology)   paravertebral block (anesthesia pain team)    PATHOLOGY RESULTS: n/a     CULTURE RESULTS: None     DRAINS/TUBES PRESENT AT DISCHARGE: None    DATE OF DISCHARGE:  2024      HOSPITAL COURSE: Brendan Helms is a 53 year old male who underwent right posterolateral thoracotomy, tracheobronchoplasty with prolene mesh, intercostal nerve cryoablation, intercostal nerve block on 24 with Dr. Dumont, was discharged home on 24. He returned to the [facility] ED on 24 with right pleural effusion and worsening pain and was admitted to the thoracic surgery service for further management. On  he underwent right pigtail chest tube placement with IR with prompt improvement in work of breathing and discomfort. He continued to have difficult to control pain associated with his thoracotomy incision. The inpatient pain service was consulted and performed paravertebral block with exparel on  with significant improvement in patient pain level.  The remainder of his course was essentially uncomplicated. R pigtail was removed on  after 48 hours of minimal output.  Prior to discharge, his pain was controlled well with PO medications, he was able to perform ADLs and ambulate independently without difficulty, he was voiding independently and had antegrade bowel function.  On 24, he was discharged to home in stable condition. We discussed staying one more night in the hospital to ensure pain control remained adequate when exparel started to wear off close at close to 72h post-procedure; patient expressed understanding of reasoning, preferred to discharge home sooner.     DISCHARGE EXAM:   See progress note of same  date    DISCHARGE INSTRUCTIONS:  Discharge Procedure Orders   Reason for your hospital stay   Order Comments: Pleural effusion, post-operative pain     Activity   Order Comments: Your activity upon discharge: no strenuous activity or lifting > 20 lb for 1-2 weeks     Order Specific Question Answer Comments   Is discharge order? Yes      Adult [facility] Follow-up and recommended labs and tests   Order Comments: Follow up with Dr. Dumont as previously scheduled with plan for chest x-ray to be completed same day as clinic appointment.     Appointments on [place] and/or [facility] (with [facility] or [facility] provider or service). Call [phone] if you haven't heard regarding these appointments within 7 days of discharge.     Discharge Instructions   Order Comments: THORACIC SURGERY DISCHARGE INSTRUCTIONS    DIET: Regular diet - as prior to admission     If your plans upon discharge include prolonged periods of sitting (i.e a lengthy car or plane ride), it is highly beneficial to get up and walk at least once per hour to help prevent swelling and blood clots.     You may remove chest tube dressing 48 hours after tube removal and bandage the site at your own discretion thereafter.  Small amounts of leakage are normal for 2-3 days after removal.  Feel free to call with questions.    You may get incision wet 2 days after operation. Do not submerge, soak, or scrub incision or swim until seen in follow-up.    Take incentive spirometer home for continued frequent use    Activity as tolerated, no strenous activity until seen in follow-up, no lifting greater than 20 pounds for the next 1-2 weeks.    Stay hydrated. Take over the counter fiber (metamucil or benefiber) and stool softeners (Miralax, docusate or senna) if becoming constipated.     Call for fever greater than 101.5, chills, increased size of incision, red skin around incision, vision changes, muscle strength changes, sensation changes, shortness of breath, or other  "concerns.    No driving while taking narcotic pain medication.    Transition to ibuprofen or tylenol/acetaminophen for pain control. Do not take tylenol/acetaminophen and acetaminophen containing narcotic (e.g., percocet or vicodin) at the same time. If you have known ulcer problems, or kidney trouble (elevated creatinine) do not take the ibuprofen.    In emergencies, call 911    For other Questions or Concerns;   A.) During weekday working hours (Monday through Friday 8am to 4:30pm)   call [phone] and ask to speak to a thoracic surgery nurse (RN or LPN).     B.) At nights (after 4:30pm), on weekends, or if urgent call [phone] and   tell the  \"I would like to page job code [phone], the thoracic surgery   fellow on call, please.\"     Diet   Order Comments: Follow this diet upon discharge: regular diet     Order Specific Question Answer Comments   Is discharge order? Yes        DISCHARGE MEDICATIONS:   Discharge Medication List as of 6/2/2024  4:29 PM        START taking these medications    Details   !! acetaminophen (TYLENOL) 500 MG tablet Take 2 tablets (1,000 mg) by mouth every 6 hours, Disp-100 tablet, R-0, E-Prescribe      !! gabapentin (NEURONTIN) 300 MG capsule Take 1 capsule (300 mg) by mouth 3 times daily, Disp-50 capsule, R-0, E-Prescribe      oxyCODONE (ROXICODONE) 5 MG tablet Take 1-2 tablets (5-10 mg) by mouth every 6 hours as needed for moderate pain (IF pain not managed with non-pharmacological and non-opioid interventions), Disp-50 tablet, R-0, Local Print      senna-docusate (SENOKOT-S/PERICOLACE) 8.6-50 MG tablet Take 1 tablet by mouth 2 times daily, Disp-28 tablet, R-0, E-Prescribe       !! - Potential duplicate medications found. Please discuss with provider.        CONTINUE these medications which have NOT CHANGED    Details   !! acetaminophen (TYLENOL) 500 MG tablet Take 2 tablets (1,000 mg) by mouth every 6 hours, OTC      albuterol (PROAIR HFA/PROVENTIL HFA/VENTOLIN HFA) " 108 (90 Base) MCG/ACT inhaler Inhale 2 puffs into the lungs every 4 hours as needed for shortness of breath or wheezing, Disp-18 g, R-1, E-PrescribePharmacy may dispense brand covered by insurance (Proair, or proventil or ventolin or generic albuterol inhaler)      albuterol (PROVENTIL) (2.5 MG/3ML) 0.083% neb solution Take 1 vial (2.5 mg) by nebulization every 6 hours as needed for shortness of breath, wheezing or cough, Disp-50 mL, R-1, E-Prescribe      atorvastatin (LIPITOR) 40 MG tablet Take 1 tablet by mouth At Bedtime, Historical      blood glucose (CONTOUR TEST) test strip Test 6 times daily and as needed., Historical      famotidine (PEPCID) 40 MG tablet Take 1 tablet (40 mg) by mouth 2 times daily as needed for heartburn, Disp-60 tablet, R-1, E-Prescribe      !! gabapentin (NEURONTIN) 300 MG capsule Take 1 capsule (300 mg) by mouth 2 times daily, Disp-120 capsule, R-0, E-Prescribe      Glucagon (BAQSIMI ONE PACK) 3 MG/DOSE POWD Historical      guaiFENesin (MUCINEX) 600 MG 12 hr tablet Take 1 tablet (600 mg) by mouth 2 times daily, Disp-14 tablet, R-0, E-Prescribe      ibuprofen (ADVIL/MOTRIN) 600 MG tablet Take 1 tablet (600 mg) by mouth every 6 hours as needed for moderate pain, OTC      insulin aspart (NOVOLOG PEN) 100 UNIT/ML pen Inject Subcutaneous 4 times daily (with meals and nightly) Sliding scale and carb counting, Historical      insulin glargine (LANTUS PEN) 100 UNIT/ML pen Inject 35 Units Subcutaneous 2 times daily 35 units in AM and 35 in PM, Historical      methocarbamol (ROBAXIN) 750 MG tablet Take 1 tablet (750 mg) by mouth every 8 hours as needed for muscle spasms, Disp-30 tablet, R-0, E-Prescribe      omeprazole (PRILOSEC) 10 MG DR capsule Take 1 capsule (10 mg) by mouth daily, Disp-30 capsule, R-0, E-Prescribe      polyethylene glycol (MIRALAX) 17 GM/Dose powder Take 17 g by mouth daily as needed for constipation, Disp-510 g, R-0, E-Prescribe      ramipril (ALTACE) 5 MG capsule Take 1  capsule by mouth at bedtime, Historical      sildenafil (REVATIO) 20 MG tablet Take 40-60 mg by mouth daily as needed, Historical      tamsulosin (FLOMAX) 0.4 MG capsule Take 0.4 mg by mouth daily, Historical       !! - Potential duplicate medications found. Please discuss with provider.        STOP taking these medications       HYDROmorphone (DILAUDID) 4 MG tablet Comments:   Reason for Stopping:         HYDROmorphone (DILAUDID) 4 MG tablet Comments:   Reason for Stopping:

## 2024-06-04 LAB
BACTERIA PLR CULT: NO GROWTH
GRAM STAIN RESULT: NORMAL
GRAM STAIN RESULT: NORMAL

## 2024-06-05 ENCOUNTER — PATIENT OUTREACH (OUTPATIENT)
Dept: SURGERY | Facility: CLINIC | Age: 54
End: 2024-06-05
Payer: COMMERCIAL

## 2024-06-05 LAB
CHYLO FLD QL: NORMAL
CHYLOMICRON BILL: NORMAL
TRIGL FLD-MCNC: 76 MG/DL

## 2024-06-05 NOTE — TELEPHONE ENCOUNTER
"Post Op Discharge Call    Surgery: Flexible bronchoscopy, Right posterolateral thoracotomy, 4th ICS, Tracheobronchoplasty with prolene mesh, Intercostal nerve cryoablation, Intercostal nerve block. On 5/17/2024. Discharged 5/23/2024.    Re-admitted 5/29/2024 for right pleural effusion, post-operative pain     Surgery date:   5/30 12Fr right chest tube placement (Interventional radiology)  5/31 paravertebral block (anesthesia pain team)    Discharge Date:  6/02/2024    Immediate Concerns: Patient reports that he has been experiencing a \"crackle in his chest\". States it is not in the lungs, but more deep in the check. Can feel the crackling when pushes on the skin/area where the bottom of the neck meets the chest (reports sometimes he can feel the crackling in that are when breathing in and out). Verbalizes that it does not affect him, he is not experiencing any pain or discomfort from it, and he personally is not concerned about it, but is informing writer because his wife asked him to.  Was very adamant (with the use of choice words) that if Dr Dumont is concerned about this, he will not go to an Emergency Room or Hospital about this.  States that he drinking hot liquids, using warm packs and taking warm showers to help with it.    Pain:  Reports that he is \"doing a lot better\". Won't rate his pain, but states that his pain \"is tolerable\". Verbalizes that the oxycodone is working much better than the dilaudid did for him previously. Informed writer that his wife has his medications all written into a time table to keep track of what he needs to take and when. \"I just take what she gives to me.\"    Incision:   No concerns, healing well, no redness, drainage or edema reported.     Drains:   No drain.     Diet:   Regular diet     Bowels:   Passing gas.   Patient reports he has had bowel movement post op.   Taking stool softeners daily.     Activity:   Patient reports that he is trying to be more active but \"gets winded " "with activity and sometimes when putting on socks\". States that he gets up and walks around the house and when his wife gets home, they walk up and down the driveway together.   No difficulty with ADLs reported.   Patient is up independently at home.   Encourage patient to improve on actively level.     Respiratory:  Reports is using Incentive Spirometer 5-7 times a day and doing about 10 breaths each time he does it.    Post op/follow up plans:   Informed patient writer will updated Dr Dumont on the \"crackling\" and reach out only if any interventions needed.  Post op appointment scheduled,confirmed date and time with patient.       Future Appointments   Date Time Provider Department Center   6/12/2024 10:15 AM [id number]   6/12/2024 11:00 AM Lisa Gandara MD [facility]       Patient with no additional questions or concerns at this time.   Patient has our direct number for any questions or concerns that may arise.  Patient appreciated writer's call.    Linda Sanchez RN, BSN  Thoracic Surgery RN Care Coordinator         "

## 2024-06-06 LAB — BACTERIA PLR CULT: NORMAL

## 2024-06-12 ENCOUNTER — ANCILLARY PROCEDURE (OUTPATIENT)
Dept: GENERAL RADIOLOGY | Facility: CLINIC | Age: 54
End: 2024-06-12
Attending: CLINICAL NURSE SPECIALIST
Payer: COMMERCIAL

## 2024-06-12 ENCOUNTER — ONCOLOGY VISIT (OUTPATIENT)
Dept: SURGERY | Facility: CLINIC | Age: 54
End: 2024-06-12
Attending: THORACIC SURGERY (CARDIOTHORACIC VASCULAR SURGERY)
Payer: COMMERCIAL

## 2024-06-12 ENCOUNTER — DOCUMENTATION ONLY (OUTPATIENT)
Dept: SURGERY | Facility: CLINIC | Age: 54
End: 2024-06-12

## 2024-06-12 VITALS
RESPIRATION RATE: 16 BRPM | BODY MASS INDEX: 30.14 KG/M2 | SYSTOLIC BLOOD PRESSURE: 106 MMHG | HEART RATE: 71 BPM | TEMPERATURE: 98 F | OXYGEN SATURATION: 98 % | DIASTOLIC BLOOD PRESSURE: 71 MMHG | WEIGHT: 222.2 LBS

## 2024-06-12 DIAGNOSIS — J39.8 TRACHEOMALACIA: ICD-10-CM

## 2024-06-12 DIAGNOSIS — J39.8 TRACHEOBRONCHOMALACIA: ICD-10-CM

## 2024-06-12 PROCEDURE — 99213 OFFICE O/P EST LOW 20 MIN: CPT | Performed by: THORACIC SURGERY (CARDIOTHORACIC VASCULAR SURGERY)

## 2024-06-12 PROCEDURE — 99024 POSTOP FOLLOW-UP VISIT: CPT | Performed by: THORACIC SURGERY (CARDIOTHORACIC VASCULAR SURGERY)

## 2024-06-12 PROCEDURE — 71046 X-RAY EXAM CHEST 2 VIEWS: CPT | Performed by: RADIOLOGY

## 2024-06-12 RX ORDER — OXYCODONE HYDROCHLORIDE 5 MG/1
5-10 TABLET ORAL EVERY 6 HOURS PRN
Qty: 20 TABLET | Refills: 0 | Status: SHIPPED | OUTPATIENT
Start: 2024-06-12

## 2024-06-12 ASSESSMENT — PAIN SCALES - GENERAL: PAINLEVEL: MODERATE PAIN (4)

## 2024-06-12 NOTE — PROGRESS NOTES
THORACIC SURGERY FOLLOW UP VISIT    I saw Mr. Brendan Helms in follow-up today. The clinical summary follows:    DIAGNOSIS   TBM and EDAC  PROCEDURE   5/17/24 Right thoracotomy, TBP with prolene mesh, intercostal nerve cryoablation.    COMPLICATIONS  Right pleural effusion     INTERVAL STUDIES  CXR: Minimal right pleural effusion.       Past Medical History:   Diagnosis Date    Diabetes, type I     HLD (hyperlipidemia)     Tracheobronchomalacia      Past Surgical History:   Procedure Laterality Date    BRONCHOSCOPY (RIGID OR FLEXIBLE), DIAGNOSTIC N/A 7/13/2023    Procedure: BRONCHOSCOPY, WITH BRONCHOALVEOLAR LAVAGE;  Surgeon: Rod Desai MD;  Location: [facility]    BRONCHOSCOPY FLEXIBLE AND RIGID N/A 5/20/2024    Procedure: Bronchoscopy flexible and rigid;  Surgeon: Rod Desai MD;  Location: [facility]    BRONCHOSCOPY, DILATE BRONCHUS, STENT BRONCHUS, COMBINED N/A 4/5/2024    Procedure: BRONCHOSCOPY, RIGID, flexible bronchoscopy , stent placement;  Surgeon: Rod Desai MD;  Location: [facility]    BRONCHOSCOPY, DILATE BRONCHUS, STENT BRONCHUS, COMBINED N/A 4/19/2024    Procedure: BRONCHOSCOPY, RIGID,  flexible bronchoscopy , stent removal, cryoprobe tissue debulking;  Surgeon: Ruthy George MD;  Location: [facility]    IR CHEST TUBE PLACEMENT NON-TUNNELED RIGHT  5/30/2024    NERVE BLOCK PERIPHERAL N/A 5/31/2024    Procedure: Nerve block peripheral;  Surgeon: GENERIC ANESTHESIA PROVIDER;  Location: [facility] OR    THORACOTOMY Right 5/17/2024    Procedure: Thoracotomy, Tracheobronchoplasty, Intercostal Nerve Cryoablation, Flexible Bronchoscopy;  Surgeon: Lisa Gandara MD;  Location: [facility]        Allergies   Allergen Reactions    Amoxicillin-Pot Clavulanate Hives     Current Outpatient Medications   Medication Sig Dispense Refill    acetaminophen (TYLENOL) 500 MG tablet Take 2 tablets (1,000 mg) by mouth every 6 hours 100 tablet 0    acetaminophen (TYLENOL) 500 MG tablet Take 2 tablets (1,000 mg) by mouth  every 6 hours      albuterol (PROAIR HFA/PROVENTIL HFA/VENTOLIN HFA) 108 (90 Base) MCG/ACT inhaler Inhale 2 puffs into the lungs every 4 hours as needed for shortness of breath or wheezing 18 g 1    albuterol (PROVENTIL) (2.5 MG/3ML) 0.083% neb solution Take 1 vial (2.5 mg) by nebulization every 6 hours as needed for shortness of breath, wheezing or cough 50 mL 1    atorvastatin (LIPITOR) 40 MG tablet Take 1 tablet by mouth At Bedtime      blood glucose (CONTOUR TEST) test strip Test 6 times daily and as needed.      famotidine (PEPCID) 40 MG tablet Take 1 tablet (40 mg) by mouth 2 times daily as needed for heartburn 60 tablet 1    gabapentin (NEURONTIN) 300 MG capsule Take 1 capsule (300 mg) by mouth 3 times daily 50 capsule 0    gabapentin (NEURONTIN) 300 MG capsule Take 1 capsule (300 mg) by mouth 2 times daily 120 capsule 0    Glucagon (BAQSIMI ONE PACK) 3 MG/DOSE POWD       guaiFENesin (MUCINEX) 600 MG 12 hr tablet Take 1 tablet (600 mg) by mouth 2 times daily 14 tablet 0    ibuprofen (ADVIL/MOTRIN) 600 MG tablet Take 1 tablet (600 mg) by mouth every 6 hours as needed for moderate pain      insulin aspart (NOVOLOG PEN) 100 UNIT/ML pen Inject Subcutaneous 4 times daily (with meals and nightly) Sliding scale and carb counting      insulin glargine (LANTUS PEN) 100 UNIT/ML pen Inject 35 Units Subcutaneous 2 times daily 35 units in AM and 35 in PM      methocarbamol (ROBAXIN) 750 MG tablet Take 1 tablet (750 mg) by mouth every 8 hours as needed for muscle spasms 30 tablet 0    omeprazole (PRILOSEC) 10 MG DR capsule Take 1 capsule (10 mg) by mouth daily 30 capsule 0    oxyCODONE (ROXICODONE) 5 MG tablet Take 1-2 tablets (5-10 mg) by mouth every 6 hours as needed for moderate pain (IF pain not managed with non-pharmacological and non-opioid interventions) 50 tablet 0    polyethylene glycol (MIRALAX) 17 GM/Dose powder Take 17 g by mouth daily as needed for constipation 510 g 0    ramipril (ALTACE) 5 MG capsule Take 1  capsule by mouth at bedtime      senna-docusate (SENOKOT-S/PERICOLACE) 8.6-50 MG tablet Take 1 tablet by mouth 2 times daily 28 tablet 0    sildenafil (REVATIO) 20 MG tablet Take 40-60 mg by mouth daily as needed      tamsulosin (FLOMAX) 0.4 MG capsule Take 0.4 mg by mouth daily       No current facility-administered medications for this visit.     Social History     Tobacco Use    Smoking status: Never    Smokeless tobacco: Never   Vaping Use    Vaping status: Never Used   Substance Use Topics    Alcohol use: Not Currently     Comment: occasional    Drug use: Never     Exam   /71 (BP Location: Right arm, Patient Position: Sitting, Cuff Size: Adult Large)   Pulse 71   Temp 98  F (36.7  C) (Oral)   Resp 16   Wt 100.8 kg (222 lb 3.2 oz)   SpO2 98%   BMI 30.14 kg/m    Alert and oriented NAD.   Bilateral breath sounds.     SUBJECTIVE  Rigoberto comes to clinic for a follow up. He is doing better overall. His pain is very well controlled, and he is taking oxycodone occasionally. She has no fever or chills. His cough is minimal and is not bothering him. He is concerned about his exertional dyspnea which is worse when he is bending over to tie his shoes or when he takes a hot shower.     From a personal perspective, he comes to clinic with his wife Corrie who is a great resource for him. She is a [employer] that works in [place].     IMPRESSION   54 y/o with TBM and EDAC, 1 month after TBP.     PLAN  I spent 30 min on the date of the encounter in chart review, patient visit, review of tests, documentation and/or discussion with other providers about the issues documented above. I reviewed the plan as follows:  I encouraged him to continue to stay active and be compliant with his activity restrictions.   I told him to continue taking Gabapentin until his right chest wall sensation is almost back to normal.   He has lost a few pounds since surgery which I believe are beneficial. I also told him to continue to take  PPIs to manage his GERD. We discussed signs and symptoms of alarm and we answered all their questions.   Follow up with me in 2 months.   I appreciate the opportunity to participate in the care of your patient and will keep you updated.  Sincerely,  Lisa Ochoa MD

## 2024-06-12 NOTE — LETTER
6/12/2024      Brendan Helms  [address]  [place] [address]      Dear Colleague,    Thank you for referring your patient, Brendan Helms, to the [facility]. Please see a copy of my visit note below.    THORACIC SURGERY FOLLOW UP VISIT    I saw Mr. Brendan Helms in follow-up today. The clinical summary follows:    DIAGNOSIS   TBM and EDAC  PROCEDURE   5/17/24 Right thoracotomy, TBP with prolene mesh, intercostal nerve cryoablation.    COMPLICATIONS  Right pleural effusion     INTERVAL STUDIES  CXR: Minimal right pleural effusion.       Past Medical History:   Diagnosis Date    Diabetes, type I     HLD (hyperlipidemia)     Tracheobronchomalacia      Past Surgical History:   Procedure Laterality Date    BRONCHOSCOPY (RIGID OR FLEXIBLE), DIAGNOSTIC N/A 7/13/2023    Procedure: BRONCHOSCOPY, WITH BRONCHOALVEOLAR LAVAGE;  Surgeon: Rod Desai MD;  Location: [facility]    BRONCHOSCOPY FLEXIBLE AND RIGID N/A 5/20/2024    Procedure: Bronchoscopy flexible and rigid;  Surgeon: Rod Desai MD;  Location: [facility]    BRONCHOSCOPY, DILATE BRONCHUS, STENT BRONCHUS, COMBINED N/A 4/5/2024    Procedure: BRONCHOSCOPY, RIGID, flexible bronchoscopy , stent placement;  Surgeon: Rod Desai MD;  [facility]    BRONCHOSCOPY, DILATE BRONCHUS, STENT BRONCHUS, COMBINED N/A 4/19/2024    Procedure: BRONCHOSCOPY, RIGID,  flexible bronchoscopy , stent removal, cryoprobe tissue debulking;  Surgeon: Ruthy George MD;  [facility]    IR CHEST TUBE PLACEMENT NON-TUNNELED RIGHT  5/30/2024    NERVE BLOCK PERIPHERAL N/A 5/31/2024    Procedure: Nerve block peripheral;  Surgeon: GENERIC ANESTHESIA PROVIDER;  Location: [facility]    THORACOTOMY Right 5/17/2024    Procedure: Thoracotomy, Tracheobronchoplasty, Intercostal Nerve Cryoablation, Flexible Bronchoscopy;  Surgeon: Lisa Gandara MD;  [facility]        Allergies   Allergen Reactions    Amoxicillin-Pot Clavulanate Hives     Current  Outpatient Medications   Medication Sig Dispense Refill    acetaminophen (TYLENOL) 500 MG tablet Take 2 tablets (1,000 mg) by mouth every 6 hours 100 tablet 0    acetaminophen (TYLENOL) 500 MG tablet Take 2 tablets (1,000 mg) by mouth every 6 hours      albuterol (PROAIR HFA/PROVENTIL HFA/VENTOLIN HFA) 108 (90 Base) MCG/ACT inhaler Inhale 2 puffs into the lungs every 4 hours as needed for shortness of breath or wheezing 18 g 1    albuterol (PROVENTIL) (2.5 MG/3ML) 0.083% neb solution Take 1 vial (2.5 mg) by nebulization every 6 hours as needed for shortness of breath, wheezing or cough 50 mL 1    atorvastatin (LIPITOR) 40 MG tablet Take 1 tablet by mouth At Bedtime      blood glucose (CONTOUR TEST) test strip Test 6 times daily and as needed.      famotidine (PEPCID) 40 MG tablet Take 1 tablet (40 mg) by mouth 2 times daily as needed for heartburn 60 tablet 1    gabapentin (NEURONTIN) 300 MG capsule Take 1 capsule (300 mg) by mouth 3 times daily 50 capsule 0    gabapentin (NEURONTIN) 300 MG capsule Take 1 capsule (300 mg) by mouth 2 times daily 120 capsule 0    Glucagon (BAQSIMI ONE PACK) 3 MG/DOSE POWD       guaiFENesin (MUCINEX) 600 MG 12 hr tablet Take 1 tablet (600 mg) by mouth 2 times daily 14 tablet 0    ibuprofen (ADVIL/MOTRIN) 600 MG tablet Take 1 tablet (600 mg) by mouth every 6 hours as needed for moderate pain      insulin aspart (NOVOLOG PEN) 100 UNIT/ML pen Inject Subcutaneous 4 times daily (with meals and nightly) Sliding scale and carb counting      insulin glargine (LANTUS PEN) 100 UNIT/ML pen Inject 35 Units Subcutaneous 2 times daily 35 units in AM and 35 in PM      methocarbamol (ROBAXIN) 750 MG tablet Take 1 tablet (750 mg) by mouth every 8 hours as needed for muscle spasms 30 tablet 0    omeprazole (PRILOSEC) 10 MG DR capsule Take 1 capsule (10 mg) by mouth daily 30 capsule 0    oxyCODONE (ROXICODONE) 5 MG tablet Take 1-2 tablets (5-10 mg) by mouth every 6 hours as needed for moderate pain (IF  pain not managed with non-pharmacological and non-opioid interventions) 50 tablet 0    polyethylene glycol (MIRALAX) 17 GM/Dose powder Take 17 g by mouth daily as needed for constipation 510 g 0    ramipril (ALTACE) 5 MG capsule Take 1 capsule by mouth at bedtime      senna-docusate (SENOKOT-S/PERICOLACE) 8.6-50 MG tablet Take 1 tablet by mouth 2 times daily 28 tablet 0    sildenafil (REVATIO) 20 MG tablet Take 40-60 mg by mouth daily as needed      tamsulosin (FLOMAX) 0.4 MG capsule Take 0.4 mg by mouth daily       No current facility-administered medications for this visit.     Social History     Tobacco Use    Smoking status: Never    Smokeless tobacco: Never   Vaping Use    Vaping status: Never Used   Substance Use Topics    Alcohol use: Not Currently     Comment: occasional    Drug use: Never     Exam   /71 (BP Location: Right arm, Patient Position: Sitting, Cuff Size: Adult Large)   Pulse 71   Temp 98  F (36.7  C) (Oral)   Resp 16   Wt 100.8 kg (222 lb 3.2 oz)   SpO2 98%   BMI 30.14 kg/m    Alert and oriented NAD.   Bilateral breath sounds.     DOMINIQUE Franklin comes to clinic for a follow up. He is doing better overall. His pain is very well controlled, and he is taking oxycodone occasionally. She has no fever or chills. His cough is minimal and is not bothering him. He is concerned about his exertional dyspnea which is worse when he is bending over to tie his shoes or when he takes a hot shower.     From a personal perspective, he comes to clinic with his wife Corrie who is a great resource for him. She is a [employer] that works in [place].     IMPRESSION   54 y/o with TBM and LUANN, 1 month after TBP.     PLAN  I spent 30 min on the date of the encounter in chart review, patient visit, review of tests, documentation and/or discussion with other providers about the issues documented above. I reviewed the plan as follows:  I encouraged him to continue to stay active and be compliant with his  activity restrictions.   I told him to continue taking Gabapentin until his right chest wall sensation is almost back to normal.   He has lost a few pounds since surgery which I believe are beneficial. I also told him to continue to take PPIs to manage his GERD. We discussed signs and symptoms of alarm and we answered all their questions.   Follow up with me in 2 months.   I appreciate the opportunity to participate in the care of your patient and will keep you updated.  Sincerely,  Lisa Ochoa MD

## 2024-06-12 NOTE — NURSING NOTE
Oncology Rooming Note    June 12, 2024 10:50 AM   Brendan Helms is a 53 year old male who presents for:    Chief Complaint   Patient presents with    Oncology Clinic Visit     Tracheobronchomalacia      Initial Vitals: /71 (BP Location: Right arm, Patient Position: Sitting, Cuff Size: Adult Large)   Pulse 71   Temp 98  F (36.7  C) (Oral)   Resp 16   Wt 100.8 kg (222 lb 3.2 oz)   SpO2 98%   BMI 30.14 kg/m   Estimated body mass index is 30.14 kg/m  as calculated from the following:    Height as of 5/17/24: 1.829 m (6').    Weight as of this encounter: 100.8 kg (222 lb 3.2 oz). Body surface area is 2.26 meters squared.  Moderate Pain (4) Comment: Data Unavailable   No LMP for male patient.  Allergies reviewed: Yes  Medications reviewed: Yes    Medications: Medication refills not needed today.  Pharmacy name entered into [employer]: [facility] [place] [place] [address] AT [facility]    Frailty Screening:   Is the patient here for a new oncology consult visit in cancer care? 2. No      Clinical concerns:  none      Crystal Cisneros

## 2024-06-12 NOTE — PROGRESS NOTES
[employer] requesting additional information for patient's claim. Requested records faxed to [phone].    Linda Sanchez RN, BSN  Thoracic Surgery RN Care Coordinator]

## 2024-06-25 ENCOUNTER — PATIENT OUTREACH (OUTPATIENT)
Dept: SURGERY | Facility: CLINIC | Age: 54
End: 2024-06-25
Payer: COMMERCIAL

## 2024-06-25 NOTE — TELEPHONE ENCOUNTER
"[facility]: Thoracic Surgery                                                                                        Received message from scheduling that when patient was contacted to schedule his 2 month follow up appointment that Dr Dumont requested after his recent clinic visit on 6/12/2024, patient requested to see Dr Dumont sooner than 2 months because \"his breathing is worse\". It was reported that patient began using vulgar and inappropriate language towards the scheduling staff.     Writer performed chart review, no mention of worsening breathing in follow up call on 6/5 or during clinic visit with Dr Dumont on 6/12, except \"He is concerned about his exertional dyspnea which is worse when he is bending over to tie his shoes or when he takes a hot shower.\", at which time Dr Dumont encouraged him to stay active and be compliant with his activity restrictions.  Both notes patient reported that he \"was doing better\".     Writer called to follow up with patient.  Spoke with patient. Patient told writer that he \"was doing just fine\". Writer inquired about the messages received, and his request to be seen by Dr Dumont sooner because \"his breathing is worse\".   Patient verbalized that his breathing has been worse since surgery and that he wants to know why. Stated that Dr Dumont told him at his last visit that it is part of the healing process, \"but is it really\", \"How can I be breathing worse after I have surgery, I should be breathing better\".  Denies pain.  States he is noticing a little improvement now that he is moving around more.     Patient verbalized that he was requesting to see Dr Dumont before 7/16/2024 because that is when his FMLA leave is up. States he mentioned this at his last visit and was frustrated when they called to schedule a 2 month follow up. Patient became verbally upset stating \"I'm sick of sitting around following your so-called restrictions and not being able to go back to work\". \"I can't " "continue to sit around not doing anything anymore\". When writer inquired how he was doing with his restrictions and activity, patient became more verbally agitated, raised his voice saying things like \"you all need to get on the same page or take the same class\", \"everyone has something different to say\", \"no one knows what's going on there\", \"you and your restrictions are why I am sitting around doing nothing and can't go back to work\". Writer firmly informed patient that when he stopped raising his voice at writer and stopped speaking of the people who are trying to help him in a belittling manner, writer would continue talking to him. Patient was receptive and did quiet down.  Writer offered patient a virtual visit with Dr Dumont on July 10th. Informed patient that Dr Dumont is currently out of the office until July 5th and that writer will update Dr Dumont on status and the reason for the visit when he returns, to inquire if Dr Dumont feels he needs to be seen in person versus virtually. Worked with patient to arrange a clinic visit time that would still work for patient if needs to be changed to In-person, taking into consideration his drive time in that situation.  Patient agreed on 10:30am.    Informed patient writer will update our APRN and Dr Headley of his ongoing breathing issues since surgery to see if they would like him to have any imaging done in the interim. Patient adamantly stated that he will not go back to the (explicit words) ED and doesn't have time to drive here. Explained to patient that the imaging can be done locally and doesn't require an ED visit. Patient verbalized his understanding.     Writer attempted to confirm that patient has writer's callback information should any questions or concerns arise. Patient responded with \"yeah, I probably do, I just don't know where\". Writer attempted to provide patient with writer's direct callback information and patient declined stating that he has nothing " to write with. Writer offered to sent patient [employer] message with writer's callback information, patient agreed.     Direct [employer] message sent to CHAD Young with above information. Rekha ordered a Chest CT to be done in the interim.     [employer] message sent to patient informing him of the need for Chest CT and inquiry as to where he would like Chest CT order faxed to.      Linda Sanchez RN, BSN  Thoracic Surgery RN Care Coordinator

## 2024-06-26 DIAGNOSIS — J39.8 TRACHEOBRONCHOMALACIA: Primary | ICD-10-CM

## 2024-06-27 LAB — BACTERIA PLR CULT: NO GROWTH

## 2024-07-05 ENCOUNTER — DOCUMENTATION ONLY (OUTPATIENT)
Dept: SURGERY | Facility: CLINIC | Age: 54
End: 2024-07-05
Payer: COMMERCIAL

## 2024-07-08 ENCOUNTER — PATIENT OUTREACH (OUTPATIENT)
Dept: SURGERY | Facility: CLINIC | Age: 54
End: 2024-07-08
Payer: COMMERCIAL

## 2024-07-08 NOTE — TELEPHONE ENCOUNTER
"[facility]: Thoracic Surgery                                                                                        Called patient to inform him that Dr Dumont is ok with his appointment on July 10 being virtual.   Inquired if patient had or has scheduled the Chest CT that Rekha Young had ordered for him. Patient immediately laughed at writer's question and stated that no-one from the [facility] has contacted him to schedule the CT scan and continued on stating \"there's no way [facility] will get him scheduled for a CT scan in time for his clinic visit with Dr Dumont on Wed.     Called [facility]. They report that they have not received the fax.  Instructed writer to fax Chest CT order to [phone]. CT order with STAT scheduling request faxed to [phone].       Linda Sanchez RN, BSN  Thoracic Surgery RN Care Coordinator    "

## 2024-07-09 NOTE — PROGRESS NOTES
Virtual Visit Details    Type of service:  Video Visit     Originating Location (pt. Location): Home    Distant Location (provider location):  On-site  Platform used for Video Visit: [facility]          THORACIC SURGERY FOLLOW UP VISIT    I saw Mr. Brendan Helms in follow-up today. The clinical summary follows:     DIAGNOSIS   TBM and EDAC    PROCEDURE   5/17/24 Right thoracotomy, TBP with prolene mesh, intercostal nerve cryoablation.     COMPLICATIONS  Right pleural effusion      INTERVAL STUDIES  None    Past Medical History:   Diagnosis Date    Diabetes, type I     HLD (hyperlipidemia)     Tracheobronchomalacia      Past Surgical History:   Procedure Laterality Date    BRONCHOSCOPY (RIGID OR FLEXIBLE), DIAGNOSTIC N/A 7/13/2023    Procedure: BRONCHOSCOPY, WITH BRONCHOALVEOLAR LAVAGE;  Surgeon: Rod Desai MD;  Location: [facility]    BRONCHOSCOPY FLEXIBLE AND RIGID N/A 5/20/2024    Procedure: Bronchoscopy flexible and rigid;  Surgeon: Rod Desai MD;  [facility]: [facility]    BRONCHOSCOPY, DILATE BRONCHUS, STENT BRONCHUS, COMBINED N/A 4/5/2024    Procedure: BRONCHOSCOPY, RIGID, flexible bronchoscopy , stent placement;  Surgeon: Rod Desai MD;  [facility]    BRONCHOSCOPY, DILATE BRONCHUS, STENT BRONCHUS, COMBINED N/A 4/19/2024    Procedure: BRONCHOSCOPY, RIGID,  flexible bronchoscopy , stent removal, cryoprobe tissue debulking;  Surgeon: Ruthy George MD;  [facility]    IR CHEST TUBE PLACEMENT NON-TUNNELED RIGHT  5/30/2024    NERVE BLOCK PERIPHERAL N/A 5/31/2024    Procedure: Nerve block peripheral;  Surgeon: GENERIC ANESTHESIA PROVIDER;  [facility]: [facility]    THORACOTOMY Right 5/17/2024    Procedure: Thoracotomy, Tracheobronchoplasty, Intercostal Nerve Cryoablation, Flexible Bronchoscopy;  Surgeon: Lisa Gandara MD;  [facility]        Allergies   Allergen Reactions    Amoxicillin-Pot Clavulanate Hives     Current Outpatient Medications   Medication Sig Dispense Refill     acetaminophen (TYLENOL) 500 MG tablet Take 2 tablets (1,000 mg) by mouth every 6 hours 100 tablet 0    acetaminophen (TYLENOL) 500 MG tablet Take 2 tablets (1,000 mg) by mouth every 6 hours      albuterol (PROAIR HFA/PROVENTIL HFA/VENTOLIN HFA) 108 (90 Base) MCG/ACT inhaler Inhale 2 puffs into the lungs every 4 hours as needed for shortness of breath or wheezing 18 g 1    albuterol (PROVENTIL) (2.5 MG/3ML) 0.083% neb solution Take 1 vial (2.5 mg) by nebulization every 6 hours as needed for shortness of breath, wheezing or cough 50 mL 1    atorvastatin (LIPITOR) 40 MG tablet Take 1 tablet by mouth At Bedtime      blood glucose (CONTOUR TEST) test strip Test 6 times daily and as needed.      famotidine (PEPCID) 40 MG tablet Take 1 tablet (40 mg) by mouth 2 times daily as needed for heartburn 60 tablet 1    gabapentin (NEURONTIN) 300 MG capsule Take 1 capsule (300 mg) by mouth 3 times daily 50 capsule 0    gabapentin (NEURONTIN) 300 MG capsule Take 1 capsule (300 mg) by mouth 2 times daily 120 capsule 0    Glucagon (BAQSIMI ONE PACK) 3 MG/DOSE POWD  (Patient not taking: Reported on 6/12/2024)      guaiFENesin (MUCINEX) 600 MG 12 hr tablet Take 1 tablet (600 mg) by mouth 2 times daily 14 tablet 0    ibuprofen (ADVIL/MOTRIN) 600 MG tablet Take 1 tablet (600 mg) by mouth every 6 hours as needed for moderate pain      insulin aspart (NOVOLOG PEN) 100 UNIT/ML pen Inject Subcutaneous 4 times daily (with meals and nightly) Sliding scale and carb counting      insulin glargine (LANTUS PEN) 100 UNIT/ML pen Inject 35 Units Subcutaneous 2 times daily 35 units in AM and 35 in PM      methocarbamol (ROBAXIN) 750 MG tablet Take 1 tablet (750 mg) by mouth every 8 hours as needed for muscle spasms 30 tablet 0    omeprazole (PRILOSEC) 10 MG DR capsule Take 1 capsule (10 mg) by mouth daily 30 capsule 0    oxyCODONE (ROXICODONE) 5 MG tablet Take 1-2 tablets (5-10 mg) by mouth every 6 hours as needed for moderate pain (IF pain not managed  with non-pharmacological and non-opioid interventions) 20 tablet 0    polyethylene glycol (MIRALAX) 17 GM/Dose powder Take 17 g by mouth daily as needed for constipation 510 g 0    ramipril (ALTACE) 5 MG capsule Take 1 capsule by mouth at bedtime      senna-docusate (SENOKOT-S/PERICOLACE) 8.6-50 MG tablet Take 1 tablet by mouth 2 times daily 28 tablet 0    sildenafil (REVATIO) 20 MG tablet Take 40-60 mg by mouth daily as needed      tamsulosin (FLOMAX) 0.4 MG capsule Take 0.4 mg by mouth daily       No current facility-administered medications for this visit.     Social History     Tobacco Use    Smoking status: Never    Smokeless tobacco: Never   Vaping Use    Vaping status: Never Used   Substance Use Topics    Alcohol use: Not Currently     Comment: occasional    Drug use: Never     Exam  Deferred    SUBJECTIVE  Rigoberto is doing better. His breathing is slowly improving. No fever or chills. Minimal cough. He is having some pain that is worse when he is just sitting down for prolonged time and improves with activity.     From a personal perspective, he is at home with his family.     IMPRESSION   52 y/o with TBM and EDAC, 6 weeks after TBP.     PLAN  I spent 60 min on the date of the encounter in chart review, patient visit, review of tests, documentation and/or discussion with other providers about the issues documented above. I reviewed the plan as follows:  He can return to work without restrictions. We will provide him with a letter to release back to work and [facility].   I gave him a refill of gabapentin to prevent allodynia from intercostal nerve cryoablation.   Follow up with Dr Desai from  in 6 months.   All questions were answered and the patient and present family were in agreement with the plan.  I appreciate the opportunity to participate in the care of your patient and will keep you updated.  Sincerely,  Lisa Ochoa MD

## 2024-07-10 ENCOUNTER — VIRTUAL VISIT (OUTPATIENT)
Dept: SURGERY | Facility: CLINIC | Age: 54
End: 2024-07-10
Attending: THORACIC SURGERY (CARDIOTHORACIC VASCULAR SURGERY)
Payer: COMMERCIAL

## 2024-07-10 VITALS — WEIGHT: 208 LBS | BODY MASS INDEX: 28.17 KG/M2 | HEIGHT: 72 IN

## 2024-07-10 DIAGNOSIS — J39.8 TRACHEOBRONCHOMALACIA: Primary | ICD-10-CM

## 2024-07-10 PROCEDURE — 99024 POSTOP FOLLOW-UP VISIT: CPT | Mod: 95 | Performed by: THORACIC SURGERY (CARDIOTHORACIC VASCULAR SURGERY)

## 2024-07-10 RX ORDER — GABAPENTIN 300 MG/1
300 CAPSULE ORAL 2 TIMES DAILY
Qty: 120 CAPSULE | Refills: 0 | Status: SHIPPED | OUTPATIENT
Start: 2024-07-10

## 2024-07-10 ASSESSMENT — PAIN SCALES - GENERAL: PAINLEVEL: NO PAIN (0)

## 2024-07-10 NOTE — NURSING NOTE
Current patient location: [address] [place] [address]  [facility] [address]    Is the patient currently in the state of [place]? YES    Visit mode:VIDEO    If the visit is dropped, the patient can be reconnected by: VIDEO VISIT: Text to cell phone:   Telephone Information:   Mobile [phone]       Will anyone else be joining the visit? NO  (If patient encounters technical issues they should call [phone]    How would you like to obtain your AVS? MyChart    Are changes needed to the allergy or medication list? Pt stated no changes to allergies and Pt stated no med changes    Are refills needed on medications prescribed by this physician? NO    Reason for visit: WENDY Jett LPN

## 2024-07-10 NOTE — LETTER
7/10/2024      Brendan Helms  [address]  [place] [address]      Dear Colleague,    Thank you for referring your patient, Brendan Helms, to the [facility]. Please see a copy of my visit note below.    Virtual Visit Details    Type of service:  Video Visit     Originating Location (pt. Location): Home    Distant Location (provider location):  On-site  Platform used for Video Visit: [facility]          THORACIC SURGERY FOLLOW UP VISIT    I saw Mr. Brendan Helms in follow-up today. The clinical summary follows:     DIAGNOSIS   TBM and EDAC    PROCEDURE   5/17/24 Right thoracotomy, TBP with prolene mesh, intercostal nerve cryoablation.     COMPLICATIONS  Right pleural effusion      INTERVAL STUDIES  None    Past Medical History:   Diagnosis Date     Diabetes, type I      HLD (hyperlipidemia)      Tracheobronchomalacia      Past Surgical History:   Procedure Laterality Date     BRONCHOSCOPY (RIGID OR FLEXIBLE), DIAGNOSTIC N/A 7/13/2023    Procedure: BRONCHOSCOPY, WITH BRONCHOALVEOLAR LAVAGE;  Surgeon: Rod Desai MD;  Location: [facility]     BRONCHOSCOPY FLEXIBLE AND RIGID N/A 5/20/2024    Procedure: Bronchoscopy flexible and rigid;  Surgeon: Rod Desai MD;  Location: [facility]     BRONCHOSCOPY, DILATE BRONCHUS, STENT BRONCHUS, COMBINED N/A 4/5/2024    Procedure: BRONCHOSCOPY, RIGID, flexible bronchoscopy , stent placement;  Surgeon: Rod Desai MD;  Location: [facility]     BRONCHOSCOPY, DILATE BRONCHUS, STENT BRONCHUS, COMBINED N/A 4/19/2024    Procedure: BRONCHOSCOPY, RIGID,  flexible bronchoscopy , stent removal, cryoprobe tissue debulking;  Surgeon: Ruthy George MD;  Location: [facility]     IR CHEST TUBE PLACEMENT NON-TUNNELED RIGHT  5/30/2024     NERVE BLOCK PERIPHERAL N/A 5/31/2024    Procedure: Nerve block peripheral;  Surgeon: GENERIC ANESTHESIA PROVIDER;  Location: [facility]     THORACOTOMY Right 5/17/2024    Procedure: Thoracotomy, Tracheobronchoplasty, Intercostal Nerve  Cryoablation, Flexible Bronchoscopy;  Surgeon: Lisa Gandara MD;  Location: [facility]        Allergies   Allergen Reactions     Amoxicillin-Pot Clavulanate Hives     Current Outpatient Medications   Medication Sig Dispense Refill     acetaminophen (TYLENOL) 500 MG tablet Take 2 tablets (1,000 mg) by mouth every 6 hours 100 tablet 0     acetaminophen (TYLENOL) 500 MG tablet Take 2 tablets (1,000 mg) by mouth every 6 hours       albuterol (PROAIR HFA/PROVENTIL HFA/VENTOLIN HFA) 108 (90 Base) MCG/ACT inhaler Inhale 2 puffs into the lungs every 4 hours as needed for shortness of breath or wheezing 18 g 1     albuterol (PROVENTIL) (2.5 MG/3ML) 0.083% neb solution Take 1 vial (2.5 mg) by nebulization every 6 hours as needed for shortness of breath, wheezing or cough 50 mL 1     atorvastatin (LIPITOR) 40 MG tablet Take 1 tablet by mouth At Bedtime       blood glucose (CONTOUR TEST) test strip Test 6 times daily and as needed.       famotidine (PEPCID) 40 MG tablet Take 1 tablet (40 mg) by mouth 2 times daily as needed for heartburn 60 tablet 1     gabapentin (NEURONTIN) 300 MG capsule Take 1 capsule (300 mg) by mouth 3 times daily 50 capsule 0     gabapentin (NEURONTIN) 300 MG capsule Take 1 capsule (300 mg) by mouth 2 times daily 120 capsule 0     Glucagon (BAQSIMI ONE PACK) 3 MG/DOSE POWD  (Patient not taking: Reported on 6/12/2024)       guaiFENesin (MUCINEX) 600 MG 12 hr tablet Take 1 tablet (600 mg) by mouth 2 times daily 14 tablet 0     ibuprofen (ADVIL/MOTRIN) 600 MG tablet Take 1 tablet (600 mg) by mouth every 6 hours as needed for moderate pain       insulin aspart (NOVOLOG PEN) 100 UNIT/ML pen Inject Subcutaneous 4 times daily (with meals and nightly) Sliding scale and carb counting       insulin glargine (LANTUS PEN) 100 UNIT/ML pen Inject 35 Units Subcutaneous 2 times daily 35 units in AM and 35 in PM       methocarbamol (ROBAXIN) 750 MG tablet Take 1 tablet (750 mg) by mouth every 8 hours as needed for  muscle spasms 30 tablet 0     omeprazole (PRILOSEC) 10 MG DR capsule Take 1 capsule (10 mg) by mouth daily 30 capsule 0     oxyCODONE (ROXICODONE) 5 MG tablet Take 1-2 tablets (5-10 mg) by mouth every 6 hours as needed for moderate pain (IF pain not managed with non-pharmacological and non-opioid interventions) 20 tablet 0     polyethylene glycol (MIRALAX) 17 GM/Dose powder Take 17 g by mouth daily as needed for constipation 510 g 0     ramipril (ALTACE) 5 MG capsule Take 1 capsule by mouth at bedtime       senna-docusate (SENOKOT-S/PERICOLACE) 8.6-50 MG tablet Take 1 tablet by mouth 2 times daily 28 tablet 0     sildenafil (REVATIO) 20 MG tablet Take 40-60 mg by mouth daily as needed       tamsulosin (FLOMAX) 0.4 MG capsule Take 0.4 mg by mouth daily       No current facility-administered medications for this visit.     Social History     Tobacco Use     Smoking status: Never     Smokeless tobacco: Never   Vaping Use     Vaping status: Never Used   Substance Use Topics     Alcohol use: Not Currently     Comment: occasional     Drug use: Never     Exam  Deferred    SUBJECTIVE  Rigoberto is doing better. His breathing is slowly improving. No fever or chills. Minimal cough. He is having some pain that is worse when he is just sitting down for prolonged time and improves with activity.     From a personal perspective, he is at home with his family.     IMPRESSION   54 y/o with TBM and EDAC, 6 weeks after TBP.     PLAN  I spent 60 min on the date of the encounter in chart review, patient visit, review of tests, documentation and/or discussion with other providers about the issues documented above. I reviewed the plan as follows:  He can return to work without restrictions. We will provide him with a letter to release back to work and send it to him through [employer].   I gave him a refill of gabapentin to prevent allodynia from intercostal nerve cryoablation.   Follow up with Dr Desai from  in 6 months.   All questions were  answered and the patient and present family were in agreement with the plan.  I appreciate the opportunity to participate in the care of your patient and will keep you updated.  Sincerely,  Lisa Ochoa MD      Again, thank you for allowing me to participate in the care of your patient.        Sincerely,        Raimundo Dumont MD

## 2024-07-28 ENCOUNTER — HEALTH MAINTENANCE LETTER (OUTPATIENT)
Age: 54
End: 2024-07-28

## 2024-08-02 DIAGNOSIS — J39.8 TRACHEOBRONCHOMALACIA: ICD-10-CM

## 2024-08-02 NOTE — TELEPHONE ENCOUNTER
Writer called and spoke to patient to gain more information regarding medication refill request. Per patient, he already obtained his Albuterol inhaler from [facility] today. Patient is confused as to why clinic had also received this refill request. Patient says he lives in [place], [place] and recently had surgery at the [facility] where he saw Dr. Dumont.     Patient says he sees Franchesca Juarez CNP. This refill request has Dr. Johansen as the attached provider.    Patient does NOT need albuterol refilled, but will route message to Dr. Johansen as FYI, as appears Dr. Johansen had refilled a few medications for patient in the past.    CALDERON SortoN, RN   [facility]

## 2024-08-02 NOTE — TELEPHONE ENCOUNTER
Fortino Frost, RN   to [facility]   HALINA    8/2/24 12:12 PM  Looks like PCP not within [facility]. Please clarify if patient is following or needs to establish care

## 2024-08-06 RX ORDER — ALBUTEROL SULFATE 90 UG/1
2 AEROSOL, METERED RESPIRATORY (INHALATION) EVERY 4 HOURS PRN
Qty: 18 G | Refills: 1 | Status: SHIPPED | OUTPATIENT
Start: 2024-08-06

## 2025-08-10 ENCOUNTER — HEALTH MAINTENANCE LETTER (OUTPATIENT)
Age: 55
End: 2025-08-10

## (undated) DEVICE — ESU ELEC BLADE 2.75" COATED/INSULATED E1455

## (undated) DEVICE — ENDO VALVE SUCTION ULTRASOUND BRONCH MAJ-1414

## (undated) DEVICE — CATH PROBE CRYOABLATION MALL FLEX 8MM BALL 180DEG CRYOS

## (undated) DEVICE — SYR 30ML SLIP TIP W/O NDL 302833

## (undated) DEVICE — ENDO VALVE SUCTION BRONCH EVIS MAJ-209

## (undated) DEVICE — ENDO VALVE BX EVIS MAJ-210

## (undated) DEVICE — SU VICRYL 0 CTX 36" J370H

## (undated) DEVICE — SU MONOCRYL 4-0 PS-2 27" UND Y426H

## (undated) DEVICE — SU VICRYL 2-0 CT-1 27" UND J259H

## (undated) DEVICE — LUBRICANT INST KIT ENDO-LUBE 220-90

## (undated) DEVICE — SU UMBILICAL TAPE .125X30" U11T

## (undated) DEVICE — PITCHER STERILE 1000ML  SSK9004A

## (undated) DEVICE — RX SURGIFLO HEMOSTATIC MATRIX W/THROMBIN 8ML 2994

## (undated) DEVICE — WIRE GUIDE AMPLATZ SUPER STIFF 0.035"X260CM M001465261

## (undated) DEVICE — SU ETHIBOND 5 LRDA 30" B499T

## (undated) DEVICE — SUCTION DRY CHEST DRAIN OASIS 3600-100

## (undated) DEVICE — SURGICEL HEMOSTAT 4X8" 1952

## (undated) DEVICE — CATH TRAY FOLEY SURESTEP 16FR W/URNE MTR STLK LATEX A303316A

## (undated) DEVICE — LINEN GOWN XLG 5407

## (undated) DEVICE — ESU PROBE ERBE FLEX 2.4MMX15MR 20402-411

## (undated) DEVICE — ENDO ADPT BRONCH SWIVEL Y A1002

## (undated) DEVICE — SYR 30ML LL W/O NDL 302832

## (undated) DEVICE — LABEL MEDICATION SYSTEM 3303-P

## (undated) DEVICE — SURGICEL POWDER ABSORBABLE HEMOSTAT 3GM 3013SP

## (undated) DEVICE — ANTIFOG SOLUTION W/FOAM PAD 31142527

## (undated) DEVICE — SU SILK 0 TIE 6X30" A306H

## (undated) DEVICE — MINOR SINGLE BASIN KIT CSR WRAPX2 7QT SSK3002

## (undated) DEVICE — KIT ENDO FIRST STEP DISINFECTANT 200ML W/POUCH EP-4

## (undated) DEVICE — DRAPE IOBAN INCISE 23X17" 6650EZ

## (undated) DEVICE — TUBING SUCTION 10'X3/16" N510

## (undated) DEVICE — SPONGE KITTNER 30-101

## (undated) DEVICE — SPECIMEN TRAP MUCOUS 40ML LUKI C30200A

## (undated) DEVICE — NDL BLUNT 18GA 1" W/O FILTER 305181

## (undated) DEVICE — LINEN TOWEL PACK X6 WHITE 5487

## (undated) DEVICE — STPL ENDO LINEAR CUT ARTICULATING 45MM ATS45

## (undated) DEVICE — GLOVE BIOGEL PI MICRO INDICATOR UNDERGLOVE SZ 8.0 48980

## (undated) DEVICE — PREP CHLORAPREP 26ML TINTED HI-LITE ORANGE 930815

## (undated) DEVICE — VESSEL LOOPS YELLOW MAXI 31145694

## (undated) DEVICE — SU SILK 0 SH 30" K834H

## (undated) DEVICE — SUCTION MANIFOLD NEPTUNE 2 SYS 4 PORT 0702-020-000

## (undated) DEVICE — SYR 10ML SLIP TIP W/O NDL 303134

## (undated) DEVICE — ENDO FORCEP ALLIGATOR JAW BIOPSY 2MMX100CM FB-211D

## (undated) DEVICE — CUP AND LID 2PK 2OZ STERILE  SSK9006A

## (undated) DEVICE — SU PROLENE 4-0 RB-1DA 36" 8557H

## (undated) DEVICE — ESU ELEC BLADE 6" COATED/INSULATED E1455-6

## (undated) DEVICE — GLOVE BIOGEL PI MICRO SZ 7.5 48575

## (undated) DEVICE — SU PLEDGET SOFT TFE 13MMX7MMX1.5MM D7044

## (undated) DEVICE — NDL COUNTER 20CT 31142493

## (undated) DEVICE — SPONGE LAP 18X18" X8435

## (undated) DEVICE — LINEN TOWEL PACK X30 5481

## (undated) DEVICE — STPL ENDO RELOAD 45X2.5MM VASC TR45W

## (undated) DEVICE — Device

## (undated) DEVICE — LINEN TOWEL PACK X5 5464

## (undated) DEVICE — DRAIN CHEST TUBE 28FR STR 8028

## (undated) DEVICE — ESU LIGASURE MARYLAND LAPAROSCOPIC SLR/DVDR 5MMX37CM LF1937

## (undated) DEVICE — ESU GROUND PAD ADULT W/CORD E7507

## (undated) RX ORDER — GLYCOPYRROLATE 0.2 MG/ML
INJECTION, SOLUTION INTRAMUSCULAR; INTRAVENOUS
Status: DISPENSED
Start: 2024-05-17

## (undated) RX ORDER — FENTANYL CITRATE 50 UG/ML
INJECTION, SOLUTION INTRAMUSCULAR; INTRAVENOUS
Status: DISPENSED
Start: 2023-07-13

## (undated) RX ORDER — PHENYLEPHRINE HCL IN 0.9% NACL 50MG/250ML
PLASTIC BAG, INJECTION (ML) INTRAVENOUS
Status: DISPENSED
Start: 2024-05-17

## (undated) RX ORDER — PROPOFOL 10 MG/ML
INJECTION, EMULSION INTRAVENOUS
Status: DISPENSED
Start: 2024-05-17

## (undated) RX ORDER — FENTANYL CITRATE-0.9 % NACL/PF 10 MCG/ML
PLASTIC BAG, INJECTION (ML) INTRAVENOUS
Status: DISPENSED
Start: 2024-05-17

## (undated) RX ORDER — LABETALOL HYDROCHLORIDE 5 MG/ML
INJECTION, SOLUTION INTRAVENOUS
Status: DISPENSED
Start: 2024-04-19

## (undated) RX ORDER — FENTANYL CITRATE-0.9 % NACL/PF 10 MCG/ML
PLASTIC BAG, INJECTION (ML) INTRAVENOUS
Status: DISPENSED
Start: 2024-04-05

## (undated) RX ORDER — PROPOFOL 10 MG/ML
INJECTION, EMULSION INTRAVENOUS
Status: DISPENSED
Start: 2024-04-19

## (undated) RX ORDER — DEXAMETHASONE SODIUM PHOSPHATE 4 MG/ML
INJECTION, SOLUTION INTRA-ARTICULAR; INTRALESIONAL; INTRAMUSCULAR; INTRAVENOUS; SOFT TISSUE
Status: DISPENSED
Start: 2024-04-05

## (undated) RX ORDER — ALBUTEROL SULFATE 90 UG/1
AEROSOL, METERED RESPIRATORY (INHALATION)
Status: DISPENSED
Start: 2024-05-17

## (undated) RX ORDER — HYDROMORPHONE HYDROCHLORIDE 1 MG/ML
INJECTION, SOLUTION INTRAMUSCULAR; INTRAVENOUS; SUBCUTANEOUS
Status: DISPENSED
Start: 2024-05-17

## (undated) RX ORDER — LIDOCAINE HYDROCHLORIDE 10 MG/ML
INJECTION, SOLUTION EPIDURAL; INFILTRATION; INTRACAUDAL; PERINEURAL
Status: DISPENSED
Start: 2024-05-30

## (undated) RX ORDER — DEXTROSE MONOHYDRATE 25 G/50ML
INJECTION, SOLUTION INTRAVENOUS
Status: DISPENSED
Start: 2024-05-17

## (undated) RX ORDER — FENTANYL CITRATE 50 UG/ML
INJECTION, SOLUTION INTRAMUSCULAR; INTRAVENOUS
Status: DISPENSED
Start: 2024-04-19

## (undated) RX ORDER — FENTANYL CITRATE 50 UG/ML
INJECTION, SOLUTION INTRAMUSCULAR; INTRAVENOUS
Status: DISPENSED
Start: 2024-05-17

## (undated) RX ORDER — LIDOCAINE HYDROCHLORIDE 20 MG/ML
SOLUTION OROPHARYNGEAL
Status: DISPENSED
Start: 2024-05-20

## (undated) RX ORDER — ALBUTEROL SULFATE 0.83 MG/ML
SOLUTION RESPIRATORY (INHALATION)
Status: DISPENSED
Start: 2024-04-05

## (undated) RX ORDER — CLINDAMYCIN PHOSPHATE 900 MG/50ML
INJECTION, SOLUTION INTRAVENOUS
Status: DISPENSED
Start: 2024-05-17

## (undated) RX ORDER — LIDOCAINE HYDROCHLORIDE 40 MG/ML
INJECTION, SOLUTION RETROBULBAR
Status: DISPENSED
Start: 2024-04-05

## (undated) RX ORDER — NICOTINE POLACRILEX 4 MG
LOZENGE BUCCAL
Status: DISPENSED
Start: 2024-04-19

## (undated) RX ORDER — ONDANSETRON 2 MG/ML
INJECTION INTRAMUSCULAR; INTRAVENOUS
Status: DISPENSED
Start: 2024-05-17

## (undated) RX ORDER — FENTANYL CITRATE 50 UG/ML
INJECTION, SOLUTION INTRAMUSCULAR; INTRAVENOUS
Status: DISPENSED
Start: 2024-04-05

## (undated) RX ORDER — FENTANYL CITRATE 50 UG/ML
INJECTION, SOLUTION INTRAMUSCULAR; INTRAVENOUS
Status: DISPENSED
Start: 2024-05-20

## (undated) RX ORDER — DEXAMETHASONE SODIUM PHOSPHATE 4 MG/ML
INJECTION, SOLUTION INTRA-ARTICULAR; INTRALESIONAL; INTRAMUSCULAR; INTRAVENOUS; SOFT TISSUE
Status: DISPENSED
Start: 2024-05-17

## (undated) RX ORDER — LIDOCAINE HYDROCHLORIDE 40 MG/ML
SOLUTION TOPICAL
Status: DISPENSED
Start: 2023-07-13

## (undated) RX ORDER — OXYCODONE HYDROCHLORIDE 5 MG/1
TABLET ORAL
Status: DISPENSED
Start: 2024-04-05

## (undated) RX ORDER — LIDOCAINE HYDROCHLORIDE 10 MG/ML
INJECTION, SOLUTION EPIDURAL; INFILTRATION; INTRACAUDAL; PERINEURAL
Status: DISPENSED
Start: 2023-07-13

## (undated) RX ORDER — ESMOLOL HYDROCHLORIDE 10 MG/ML
INJECTION INTRAVENOUS
Status: DISPENSED
Start: 2024-04-05

## (undated) RX ORDER — CEFAZOLIN SODIUM 1 G/3ML
INJECTION, POWDER, FOR SOLUTION INTRAMUSCULAR; INTRAVENOUS
Status: DISPENSED
Start: 2024-05-17

## (undated) RX ORDER — DEXTROSE MONOHYDRATE 25 G/50ML
INJECTION, SOLUTION INTRAVENOUS
Status: DISPENSED
Start: 2024-04-19

## (undated) RX ORDER — FENTANYL CITRATE 50 UG/ML
INJECTION, SOLUTION INTRAMUSCULAR; INTRAVENOUS
Status: DISPENSED
Start: 2024-05-30

## (undated) RX ORDER — LIDOCAINE HYDROCHLORIDE 40 MG/ML
SOLUTION TOPICAL
Status: DISPENSED
Start: 2024-05-20

## (undated) RX ORDER — ONDANSETRON 2 MG/ML
INJECTION INTRAMUSCULAR; INTRAVENOUS
Status: DISPENSED
Start: 2024-04-05

## (undated) RX ORDER — CLINDAMYCIN PHOSPHATE 900 MG/50ML
INJECTION, SOLUTION INTRAVENOUS
Status: DISPENSED
Start: 2024-04-19

## (undated) RX ORDER — LIDOCAINE HYDROCHLORIDE 10 MG/ML
INJECTION, SOLUTION EPIDURAL; INFILTRATION; INTRACAUDAL; PERINEURAL
Status: DISPENSED
Start: 2024-05-20

## (undated) RX ORDER — FENTANYL CITRATE 50 UG/ML
INJECTION, SOLUTION INTRAMUSCULAR; INTRAVENOUS
Status: DISPENSED
Start: 2024-05-31

## (undated) RX ORDER — BUPIVACAINE HYDROCHLORIDE AND EPINEPHRINE 2.5; 5 MG/ML; UG/ML
INJECTION, SOLUTION EPIDURAL; INFILTRATION; INTRACAUDAL; PERINEURAL
Status: DISPENSED
Start: 2024-05-17

## (undated) RX ORDER — HYDROMORPHONE HYDROCHLORIDE 1 MG/ML
INJECTION, SOLUTION INTRAMUSCULAR; INTRAVENOUS; SUBCUTANEOUS
Status: DISPENSED
Start: 2024-05-30

## (undated) RX ORDER — CALCIUM CHLORIDE 100 MG/ML
INJECTION INTRAVENOUS; INTRAVENTRICULAR
Status: DISPENSED
Start: 2024-05-17